# Patient Record
Sex: MALE | Race: WHITE | Employment: UNEMPLOYED | URBAN - METROPOLITAN AREA
[De-identification: names, ages, dates, MRNs, and addresses within clinical notes are randomized per-mention and may not be internally consistent; named-entity substitution may affect disease eponyms.]

---

## 2018-12-21 ENCOUNTER — TELEPHONE (OUTPATIENT)
Dept: FAMILY MEDICINE CLINIC | Age: 23
End: 2018-12-21

## 2018-12-21 ENCOUNTER — OFFICE VISIT (OUTPATIENT)
Dept: FAMILY MEDICINE CLINIC | Age: 23
End: 2018-12-21
Payer: COMMERCIAL

## 2018-12-21 VITALS
BODY MASS INDEX: 26.51 KG/M2 | HEART RATE: 68 BPM | HEIGHT: 73 IN | WEIGHT: 200 LBS | DIASTOLIC BLOOD PRESSURE: 80 MMHG | SYSTOLIC BLOOD PRESSURE: 120 MMHG

## 2018-12-21 DIAGNOSIS — R42 DIZZINESS: ICD-10-CM

## 2018-12-21 DIAGNOSIS — M54.41 CHRONIC LOW BACK PAIN WITH BILATERAL SCIATICA, UNSPECIFIED BACK PAIN LATERALITY: Primary | ICD-10-CM

## 2018-12-21 DIAGNOSIS — M54.42 CHRONIC LOW BACK PAIN WITH BILATERAL SCIATICA, UNSPECIFIED BACK PAIN LATERALITY: Primary | ICD-10-CM

## 2018-12-21 DIAGNOSIS — G89.29 CHRONIC LOW BACK PAIN WITH BILATERAL SCIATICA, UNSPECIFIED BACK PAIN LATERALITY: Primary | ICD-10-CM

## 2018-12-21 DIAGNOSIS — I49.49 EXTRASYSTOLES: ICD-10-CM

## 2018-12-21 PROCEDURE — 99202 OFFICE O/P NEW SF 15 MIN: CPT | Performed by: NURSE PRACTITIONER

## 2018-12-21 PROCEDURE — 93000 ELECTROCARDIOGRAM COMPLETE: CPT | Performed by: NURSE PRACTITIONER

## 2018-12-21 RX ORDER — MELOXICAM 7.5 MG/1
7.5 TABLET ORAL DAILY
Qty: 30 TABLET | Refills: 3 | Status: SHIPPED | OUTPATIENT
Start: 2018-12-21 | End: 2019-01-21 | Stop reason: ALTCHOICE

## 2018-12-21 ASSESSMENT — ENCOUNTER SYMPTOMS
ABDOMINAL PAIN: 0
ALLERGIC/IMMUNOLOGIC NEGATIVE: 1
CONSTIPATION: 0
RESPIRATORY NEGATIVE: 1
SHORTNESS OF BREATH: 0
VOMITING: 0
COUGH: 0
NAUSEA: 0
DIARRHEA: 0
GASTROINTESTINAL NEGATIVE: 1
BACK PAIN: 0

## 2019-01-04 ENCOUNTER — TELEPHONE (OUTPATIENT)
Dept: FAMILY MEDICINE CLINIC | Age: 24
End: 2019-01-04

## 2019-01-21 ENCOUNTER — OFFICE VISIT (OUTPATIENT)
Dept: FAMILY MEDICINE CLINIC | Age: 24
End: 2019-01-21
Payer: COMMERCIAL

## 2019-01-21 VITALS
BODY MASS INDEX: 27.97 KG/M2 | HEART RATE: 68 BPM | WEIGHT: 212 LBS | SYSTOLIC BLOOD PRESSURE: 124 MMHG | DIASTOLIC BLOOD PRESSURE: 78 MMHG

## 2019-01-21 DIAGNOSIS — R42 DIZZINESS: ICD-10-CM

## 2019-01-21 DIAGNOSIS — Z87.448 H/O HEMATURIA: ICD-10-CM

## 2019-01-21 DIAGNOSIS — M54.42 CHRONIC BILATERAL LOW BACK PAIN WITH BILATERAL SCIATICA: Primary | ICD-10-CM

## 2019-01-21 DIAGNOSIS — M54.41 CHRONIC BILATERAL LOW BACK PAIN WITH BILATERAL SCIATICA: Primary | ICD-10-CM

## 2019-01-21 DIAGNOSIS — G89.29 CHRONIC NECK PAIN: ICD-10-CM

## 2019-01-21 DIAGNOSIS — M54.2 CHRONIC NECK PAIN: ICD-10-CM

## 2019-01-21 DIAGNOSIS — G89.29 CHRONIC BILATERAL LOW BACK PAIN WITH BILATERAL SCIATICA: Primary | ICD-10-CM

## 2019-01-21 DIAGNOSIS — G89.29 CHRONIC THORACIC SPINE PAIN: ICD-10-CM

## 2019-01-21 DIAGNOSIS — R30.0 BURNING WITH URINATION: ICD-10-CM

## 2019-01-21 DIAGNOSIS — M54.6 CHRONIC THORACIC SPINE PAIN: ICD-10-CM

## 2019-01-21 LAB
BILIRUBIN, POC: NORMAL
BLOOD URINE, POC: NORMAL
CLARITY, POC: NORMAL
COLOR, POC: YELLOW
GLUCOSE URINE, POC: NORMAL
KETONES, POC: NORMAL
LEUKOCYTE EST, POC: NORMAL
NITRITE, POC: NORMAL
PH, POC: 6
PROTEIN, POC: NORMAL
SPECIFIC GRAVITY, POC: 1.03
UROBILINOGEN, POC: 0.2

## 2019-01-21 PROCEDURE — 99213 OFFICE O/P EST LOW 20 MIN: CPT | Performed by: NURSE PRACTITIONER

## 2019-01-21 PROCEDURE — 81003 URINALYSIS AUTO W/O SCOPE: CPT | Performed by: NURSE PRACTITIONER

## 2019-01-21 ASSESSMENT — ENCOUNTER SYMPTOMS
BACK PAIN: 1
DIARRHEA: 0
CONSTIPATION: 0
NAUSEA: 0
RESPIRATORY NEGATIVE: 1
COUGH: 0
GASTROINTESTINAL NEGATIVE: 1
ABDOMINAL PAIN: 0
SHORTNESS OF BREATH: 0
VOMITING: 0
ALLERGIC/IMMUNOLOGIC NEGATIVE: 1
RECTAL PAIN: 0

## 2019-01-21 ASSESSMENT — PATIENT HEALTH QUESTIONNAIRE - PHQ9: DEPRESSION UNABLE TO ASSESS: URGENT/EMERGENT SITUATION

## 2019-02-06 ENCOUNTER — TELEPHONE (OUTPATIENT)
Dept: FAMILY MEDICINE CLINIC | Age: 24
End: 2019-02-06

## 2019-02-06 DIAGNOSIS — M54.41 CHRONIC BILATERAL LOW BACK PAIN WITH BILATERAL SCIATICA: ICD-10-CM

## 2019-02-06 DIAGNOSIS — M54.6 CHRONIC THORACIC SPINE PAIN: ICD-10-CM

## 2019-02-06 DIAGNOSIS — G89.29 CHRONIC NECK PAIN: Primary | ICD-10-CM

## 2019-02-06 DIAGNOSIS — M54.42 CHRONIC BILATERAL LOW BACK PAIN WITH BILATERAL SCIATICA: ICD-10-CM

## 2019-02-06 DIAGNOSIS — G89.29 CHRONIC BILATERAL LOW BACK PAIN WITH BILATERAL SCIATICA: ICD-10-CM

## 2019-02-06 DIAGNOSIS — M54.2 CHRONIC NECK PAIN: Primary | ICD-10-CM

## 2019-02-06 DIAGNOSIS — G89.29 CHRONIC THORACIC SPINE PAIN: ICD-10-CM

## 2019-02-12 ENCOUNTER — TELEPHONE (OUTPATIENT)
Dept: FAMILY MEDICINE CLINIC | Age: 24
End: 2019-02-12

## 2019-02-12 DIAGNOSIS — M54.6 CHRONIC THORACIC SPINE PAIN: ICD-10-CM

## 2019-02-12 DIAGNOSIS — G89.29 CHRONIC NECK PAIN: ICD-10-CM

## 2019-02-12 DIAGNOSIS — G89.29 CHRONIC BILATERAL LOW BACK PAIN WITH BILATERAL SCIATICA: Primary | ICD-10-CM

## 2019-02-12 DIAGNOSIS — G89.29 CHRONIC THORACIC SPINE PAIN: ICD-10-CM

## 2019-02-12 DIAGNOSIS — M54.42 CHRONIC BILATERAL LOW BACK PAIN WITH BILATERAL SCIATICA: ICD-10-CM

## 2019-02-12 DIAGNOSIS — M54.42 CHRONIC BILATERAL LOW BACK PAIN WITH BILATERAL SCIATICA: Primary | ICD-10-CM

## 2019-02-12 DIAGNOSIS — M54.2 CHRONIC NECK PAIN: Primary | ICD-10-CM

## 2019-02-12 DIAGNOSIS — G89.29 CHRONIC BILATERAL LOW BACK PAIN WITH BILATERAL SCIATICA: ICD-10-CM

## 2019-02-12 DIAGNOSIS — M54.41 CHRONIC BILATERAL LOW BACK PAIN WITH BILATERAL SCIATICA: Primary | ICD-10-CM

## 2019-02-12 DIAGNOSIS — G89.29 CHRONIC NECK PAIN: Primary | ICD-10-CM

## 2019-02-12 DIAGNOSIS — M54.2 CHRONIC NECK PAIN: ICD-10-CM

## 2019-02-12 DIAGNOSIS — M54.41 CHRONIC BILATERAL LOW BACK PAIN WITH BILATERAL SCIATICA: ICD-10-CM

## 2019-02-28 ENCOUNTER — TELEPHONE (OUTPATIENT)
Dept: FAMILY MEDICINE CLINIC | Age: 24
End: 2019-02-28

## 2019-03-22 ENCOUNTER — OFFICE VISIT (OUTPATIENT)
Dept: FAMILY MEDICINE CLINIC | Age: 24
End: 2019-03-22
Payer: COMMERCIAL

## 2019-03-22 VITALS
WEIGHT: 213 LBS | HEART RATE: 76 BPM | DIASTOLIC BLOOD PRESSURE: 84 MMHG | BODY MASS INDEX: 28.1 KG/M2 | SYSTOLIC BLOOD PRESSURE: 124 MMHG

## 2019-03-22 DIAGNOSIS — R20.0 NUMBNESS AND TINGLING OF LEFT UPPER AND LOWER EXTREMITY: ICD-10-CM

## 2019-03-22 DIAGNOSIS — R26.9 ALTERED GAIT: ICD-10-CM

## 2019-03-22 DIAGNOSIS — M54.6 CHRONIC THORACIC SPINE PAIN: ICD-10-CM

## 2019-03-22 DIAGNOSIS — M54.41 CHRONIC BILATERAL LOW BACK PAIN WITH BILATERAL SCIATICA: Primary | ICD-10-CM

## 2019-03-22 DIAGNOSIS — M54.42 CHRONIC BILATERAL LOW BACK PAIN WITH BILATERAL SCIATICA: Primary | ICD-10-CM

## 2019-03-22 DIAGNOSIS — R42 DIZZINESS: ICD-10-CM

## 2019-03-22 DIAGNOSIS — G89.29 CHRONIC THORACIC SPINE PAIN: ICD-10-CM

## 2019-03-22 DIAGNOSIS — R20.2 NUMBNESS AND TINGLING OF LEFT UPPER AND LOWER EXTREMITY: ICD-10-CM

## 2019-03-22 DIAGNOSIS — G89.29 CHRONIC BILATERAL LOW BACK PAIN WITH BILATERAL SCIATICA: Primary | ICD-10-CM

## 2019-03-22 PROCEDURE — G8427 DOCREV CUR MEDS BY ELIG CLIN: HCPCS | Performed by: NURSE PRACTITIONER

## 2019-03-22 PROCEDURE — G8419 CALC BMI OUT NRM PARAM NOF/U: HCPCS | Performed by: NURSE PRACTITIONER

## 2019-03-22 PROCEDURE — 99214 OFFICE O/P EST MOD 30 MIN: CPT | Performed by: NURSE PRACTITIONER

## 2019-03-22 PROCEDURE — G8484 FLU IMMUNIZE NO ADMIN: HCPCS | Performed by: NURSE PRACTITIONER

## 2019-03-22 PROCEDURE — 1036F TOBACCO NON-USER: CPT | Performed by: NURSE PRACTITIONER

## 2019-03-22 RX ORDER — LIDOCAINE 50 MG/G
1 PATCH TOPICAL DAILY
Qty: 30 PATCH | Refills: 0 | Status: SHIPPED | OUTPATIENT
Start: 2019-03-22 | End: 2019-04-21

## 2019-03-22 ASSESSMENT — ENCOUNTER SYMPTOMS
COUGH: 0
GASTROINTESTINAL NEGATIVE: 1
BACK PAIN: 1
VOMITING: 0
RESPIRATORY NEGATIVE: 1
ABDOMINAL PAIN: 0
EYES NEGATIVE: 1
DIARRHEA: 0
NAUSEA: 0
CONSTIPATION: 0
ALLERGIC/IMMUNOLOGIC NEGATIVE: 1
SHORTNESS OF BREATH: 0

## 2019-03-26 ENCOUNTER — OFFICE VISIT (OUTPATIENT)
Dept: FAMILY MEDICINE CLINIC | Age: 24
End: 2019-03-26
Payer: COMMERCIAL

## 2019-03-26 ENCOUNTER — TELEPHONE (OUTPATIENT)
Dept: FAMILY MEDICINE CLINIC | Age: 24
End: 2019-03-26

## 2019-03-26 VITALS
SYSTOLIC BLOOD PRESSURE: 138 MMHG | WEIGHT: 214 LBS | DIASTOLIC BLOOD PRESSURE: 80 MMHG | BODY MASS INDEX: 27.46 KG/M2 | HEIGHT: 74 IN | RESPIRATION RATE: 12 BRPM | HEART RATE: 84 BPM

## 2019-03-26 DIAGNOSIS — R20.0 NUMBNESS AND TINGLING OF LEFT UPPER AND LOWER EXTREMITY: ICD-10-CM

## 2019-03-26 DIAGNOSIS — M54.2 CHRONIC NECK PAIN: ICD-10-CM

## 2019-03-26 DIAGNOSIS — G89.29 CHRONIC NECK PAIN: ICD-10-CM

## 2019-03-26 DIAGNOSIS — R20.2 NUMBNESS AND TINGLING OF LEFT UPPER AND LOWER EXTREMITY: ICD-10-CM

## 2019-03-26 DIAGNOSIS — M54.42 CHRONIC BILATERAL LOW BACK PAIN WITH BILATERAL SCIATICA: Primary | ICD-10-CM

## 2019-03-26 DIAGNOSIS — R20.0 NUMBNESS AND TINGLING OF RIGHT ARM AND LEG: ICD-10-CM

## 2019-03-26 DIAGNOSIS — R20.2 NUMBNESS AND TINGLING OF RIGHT ARM AND LEG: ICD-10-CM

## 2019-03-26 DIAGNOSIS — G89.29 CHRONIC BILATERAL LOW BACK PAIN WITH BILATERAL SCIATICA: Primary | ICD-10-CM

## 2019-03-26 DIAGNOSIS — G89.29 CHRONIC THORACIC SPINE PAIN: ICD-10-CM

## 2019-03-26 DIAGNOSIS — M54.41 CHRONIC BILATERAL LOW BACK PAIN WITH BILATERAL SCIATICA: Primary | ICD-10-CM

## 2019-03-26 DIAGNOSIS — M54.6 CHRONIC THORACIC SPINE PAIN: ICD-10-CM

## 2019-03-26 PROCEDURE — 1036F TOBACCO NON-USER: CPT | Performed by: NURSE PRACTITIONER

## 2019-03-26 PROCEDURE — G8484 FLU IMMUNIZE NO ADMIN: HCPCS | Performed by: NURSE PRACTITIONER

## 2019-03-26 PROCEDURE — G8419 CALC BMI OUT NRM PARAM NOF/U: HCPCS | Performed by: NURSE PRACTITIONER

## 2019-03-26 PROCEDURE — 99214 OFFICE O/P EST MOD 30 MIN: CPT | Performed by: NURSE PRACTITIONER

## 2019-03-26 PROCEDURE — G8427 DOCREV CUR MEDS BY ELIG CLIN: HCPCS | Performed by: NURSE PRACTITIONER

## 2019-03-26 ASSESSMENT — PATIENT HEALTH QUESTIONNAIRE - PHQ9
SUM OF ALL RESPONSES TO PHQ QUESTIONS 1-9: 0
2. FEELING DOWN, DEPRESSED OR HOPELESS: 0
1. LITTLE INTEREST OR PLEASURE IN DOING THINGS: 0
SUM OF ALL RESPONSES TO PHQ QUESTIONS 1-9: 0
SUM OF ALL RESPONSES TO PHQ9 QUESTIONS 1 & 2: 0

## 2019-04-03 ENCOUNTER — OFFICE VISIT (OUTPATIENT)
Dept: FAMILY MEDICINE CLINIC | Age: 24
End: 2019-04-03
Payer: COMMERCIAL

## 2019-04-03 VITALS
HEART RATE: 92 BPM | WEIGHT: 205 LBS | BODY MASS INDEX: 26.31 KG/M2 | DIASTOLIC BLOOD PRESSURE: 84 MMHG | SYSTOLIC BLOOD PRESSURE: 122 MMHG | HEIGHT: 74 IN

## 2019-04-03 DIAGNOSIS — F41.9 ANXIETY: ICD-10-CM

## 2019-04-03 DIAGNOSIS — F34.1 DYSTHYMIA: Primary | ICD-10-CM

## 2019-04-03 DIAGNOSIS — K21.9 GASTROESOPHAGEAL REFLUX DISEASE, ESOPHAGITIS PRESENCE NOT SPECIFIED: ICD-10-CM

## 2019-04-03 DIAGNOSIS — R11.2 NAUSEA AND VOMITING, INTRACTABILITY OF VOMITING NOT SPECIFIED, UNSPECIFIED VOMITING TYPE: ICD-10-CM

## 2019-04-03 PROCEDURE — G8427 DOCREV CUR MEDS BY ELIG CLIN: HCPCS | Performed by: NURSE PRACTITIONER

## 2019-04-03 PROCEDURE — 99214 OFFICE O/P EST MOD 30 MIN: CPT | Performed by: NURSE PRACTITIONER

## 2019-04-03 PROCEDURE — G8419 CALC BMI OUT NRM PARAM NOF/U: HCPCS | Performed by: NURSE PRACTITIONER

## 2019-04-03 PROCEDURE — 1036F TOBACCO NON-USER: CPT | Performed by: NURSE PRACTITIONER

## 2019-04-03 RX ORDER — BUPROPION HYDROCHLORIDE 150 MG/1
TABLET ORAL
Qty: 60 TABLET | Refills: 1 | Status: SHIPPED | OUTPATIENT
Start: 2019-04-03 | End: 2019-10-29

## 2019-04-03 RX ORDER — ONDANSETRON 4 MG/1
4 TABLET, FILM COATED ORAL DAILY PRN
Qty: 30 TABLET | Refills: 1 | Status: SHIPPED | OUTPATIENT
Start: 2019-04-03 | End: 2019-05-28 | Stop reason: SDUPTHER

## 2019-04-03 RX ORDER — LORAZEPAM 0.5 MG/1
0.5 TABLET ORAL EVERY 8 HOURS PRN
Qty: 10 TABLET | Refills: 0 | Status: SHIPPED | OUTPATIENT
Start: 2019-04-03 | End: 2019-05-03 | Stop reason: SDUPTHER

## 2019-04-03 RX ORDER — RANITIDINE 150 MG/1
150 TABLET ORAL 2 TIMES DAILY PRN
Qty: 60 TABLET | Refills: 6 | Status: SHIPPED | OUTPATIENT
Start: 2019-04-03 | End: 2020-01-07 | Stop reason: ALTCHOICE

## 2019-04-03 NOTE — PROGRESS NOTES
Musculoskeletal: Positive for back pain (Chronic. ). Neurological: Positive for dizziness (Unchanged. Cardiac testing negative. Will be seeing neurology ). Psychiatric/Behavioral: Positive for agitation, dysphoric mood and sleep disturbance. Negative for self-injury and suicidal ideas. The patient is nervous/anxious.         PAST MEDICAL HISTORY    Past Medical History:   Diagnosis Date    Chronic back pain     Undescended right testes        FAMILY HISTORY    Family History   Problem Relation Age of Onset    High Blood Pressure Mother     Heart Disease Mother     Heart Attack Mother 40    Cancer Father         unknown kind     Heart Attack Maternal Grandmother 61        COD     Other Sister         POTS     Heart Attack Maternal Grandfather 48    Prostate Cancer Maternal Grandfather     Stroke Maternal Grandfather         2000    No Known Problems Paternal Grandmother     No Known Problems Paternal Grandfather        SOCIAL HISTORY    Social History     Socioeconomic History    Marital status: Single     Spouse name: None    Number of children: None    Years of education: None    Highest education level: None   Occupational History    None   Social Needs    Financial resource strain: None    Food insecurity:     Worry: None     Inability: None    Transportation needs:     Medical: None     Non-medical: None   Tobacco Use    Smoking status: Former Smoker     Packs/day: 1.00     Years: 6.00     Pack years: 6.00     Types: Cigarettes     Last attempt to quit: 2018     Years since quittin.8    Smokeless tobacco: Never Used   Substance and Sexual Activity    Alcohol use: Yes     Comment: Occasionally     Drug use: No    Sexual activity: Yes     Partners: Female   Lifestyle    Physical activity:     Days per week: None     Minutes per session: None    Stress: None   Relationships    Social connections:     Talks on phone: None     Gets together: None     Attends Mu-ism service: None     Active member of club or organization: None     Attends meetings of clubs or organizations: None     Relationship status: None    Intimate partner violence:     Fear of current or ex partner: None     Emotionally abused: None     Physically abused: None     Forced sexual activity: None   Other Topics Concern    None   Social History Narrative    None       SURGICAL HISTORY    Past Surgical History:   Procedure Laterality Date    TESTICLE SURGERY Right 2011    undecended testicle operation     TYMPANOSTOMY TUBE PLACEMENT  1995       CURRENT MEDICATIONS    Current Outpatient Medications   Medication Sig Dispense Refill    ondansetron (ZOFRAN) 4 MG tablet Take 1 tablet by mouth daily as needed for Nausea or Vomiting 30 tablet 1    buPROPion (WELLBUTRIN XL) 150 MG extended release tablet Take 1 tablet by mouth every morning for 14 days, THEN 2 tablets every morning. 60 tablet 1    ranitidine (ZANTAC) 150 MG tablet Take 1 tablet by mouth 2 times daily as needed for Heartburn 60 tablet 6    LORazepam (ATIVAN) 0.5 MG tablet Take 1 tablet by mouth every 8 hours as needed for Anxiety for up to 30 days. 10 tablet 0    lidocaine (LIDODERM) 5 % Place 1 patch onto the skin daily 12 hours on, 12 hours off. 30 patch 0     No current facility-administered medications for this visit. ALLERGIES    Allergies   Allergen Reactions    Rondec Hives and Nausea And Vomiting       PHYSICAL EXAM   Physical Exam   Constitutional: He is oriented to person, place, and time. Vital signs are normal. He appears well-developed and well-nourished. He is cooperative. No distress. HENT:   Head: Normocephalic. Right Ear: Hearing normal.   Left Ear: Hearing normal.   Eyes: Right eye exhibits no discharge. Left eye exhibits no discharge. Pupils are equal.   Neck: Normal range of motion. Cardiovascular: Normal rate, regular rhythm, S1 normal, S2 normal, normal heart sounds and normal pulses.    No murmur heard.  Pulses:       Radial pulses are 2+ on the right side, and 2+ on the left side. Pulmonary/Chest: Effort normal and breath sounds normal. No respiratory distress. He has no wheezes. Abdominal:   Deferred abdominal exam per patient's request due to back pain. Neurological: He is alert and oriented to person, place, and time. Skin: Skin is warm and dry. He is not diaphoretic. Psychiatric: His behavior is normal. His mood appears anxious. His affect is blunt. He exhibits a depressed mood. He expresses no homicidal and no suicidal ideation. He expresses no suicidal plans and no homicidal plans. Nursing note and vitals reviewed. Assessment/PLan  1. Dysthymia    - buPROPion (WELLBUTRIN XL) 150 MG extended release tablet; Take 1 tablet by mouth every morning for 14 days, THEN 2 tablets every morning. Dispense: 60 tablet; Refill: 1  -Extensive discussion over different medication options and side effects.  -Offered referral to counselor. Patient declines at this time.   -Discussed importance of calling office with any concerns regarding medication or side effects. 2. Anxiety    - LORazepam (ATIVAN) 0.5 MG tablet; Take 1 tablet by mouth every 8 hours as needed for Anxiety for up to 30 days. Dispense: 10 tablet; Refill: 0  -Discussed addictive potential of medication.   -Advised patient to not drink while taking medication.  -Advised to not drive while taking mediation until he knows how it affects him.   -Advised of need for regular follow-up appointments with our office q 3 months medication is continued.   -Patient voiced understanding of POC. 3. Gastroesophageal reflux disease, esophagitis presence not specified    - ranitidine (ZANTAC) 150 MG tablet; Take 1 tablet by mouth 2 times daily as needed for Heartburn  Dispense: 60 tablet;  Refill: 6  -Advised to take daily for at least 1 month and then after that time he may trial a PRN dosing.  -Advised of activity and dietary modification to aide in the reduction of GERD sx.     4. Nausea and vomiting, intractability of vomiting not specified, unspecified vomiting type    - ondansetron (ZOFRAN) 4 MG tablet; Take 1 tablet by mouth daily as needed for Nausea or Vomiting  Dispense: 30 tablet; Refill: 1  - ranitidine (ZANTAC) 150 MG tablet; Take 1 tablet by mouth 2 times daily as needed for Heartburn  Dispense: 60 tablet; Refill: 6      I have spent 30 minutes face to face with the patient more than 50 % if this time was spent counseling and coordinating care. Vinay Carlossal received counseling on the following healthy behaviors: nutrition, exercise and medication adherence  Reviewed prior labs and health maintenance. Continue current medications, diet and exercise. Discussed use, benefit, and side effects of prescribed medications. Barriers to medication compliance addressed. Patient given educational materials - see patient instructions. All patient questions answered. Patient voiced understanding. Return in about 1 month (around 5/3/2019) for Anxiety, depression.     Electronically signed by ANDREA Esqueda CNP on 4/3/19 at 11:56 AM

## 2019-04-03 NOTE — PATIENT INSTRUCTIONS
involved in social groups, or volunteer to help others. Being alone sometimes makes things seem worse than they are. · Get at least 30 minutes of exercise on most days of the week to relieve stress. Walking is a good choice. You also may want to do other activities, such as running, swimming, cycling, or playing tennis or team sports. Relaxation techniques  Do relaxation exercises 10 to 20 minutes a day. You can play soothing, relaxing music while you do them, if you wish. · Tell others in your house that you are going to do your relaxation exercises. Ask them not to disturb you. · Find a comfortable place, away from all distractions and noise. · Lie down on your back, or sit with your back straight. · Focus on your breathing. Make it slow and steady. · Breathe in through your nose. Breathe out through either your nose or mouth. · Breathe deeply, filling up the area between your navel and your rib cage. Breathe so that your belly goes up and down. · Do not hold your breath. · Breathe like this for 5 to 10 minutes. Notice the feeling of calmness throughout your whole body. As you continue to breathe slowly and deeply, relax by doing the following for another 5 to 10 minutes:  · Tighten and relax each muscle group in your body. You can begin at your toes and work your way up to your head. · Imagine your muscle groups relaxing and becoming heavy. · Empty your mind of all thoughts. · Let yourself relax more and more deeply. · Become aware of the state of calmness that surrounds you. · When your relaxation time is over, you can bring yourself back to alertness by moving your fingers and toes and then your hands and feet and then stretching and moving your entire body. Sometimes people fall asleep during relaxation, but they usually wake up shortly afterward.   · Always give yourself time to return to full alertness before you drive a car or do anything that might cause an accident if you are not fully alert. Never play a relaxation tape while you drive a car. When should you call for help? Call 911 anytime you think you may need emergency care. For example, call if:    · You feel you cannot stop from hurting yourself or someone else.   Kena Elias the numbers for these national suicide hotlines: 7-051-931-TALK (4-806.337.1181) and 9-732-DYPZETE (6-444.661.9169). If you or someone you know talks about suicide or feeling hopeless, get help right away.   Watch closely for changes in your health, and be sure to contact your doctor if:    · You have anxiety or fear that affects your life.     · You have symptoms of anxiety that are new or different from those you had before. Where can you learn more? Go to https://[employer]formanceeb.[email or web address]. org and sign in to your [employer] account. Enter P754 in the [employer] box to learn more about \"Anxiety Disorder: Care Instructions. \"     If you do not have an account, please click on the \"Sign Up Now\" link. Current as of: September 11, 2018  Content Version: 11.9  © 2932-9930 [employer]. Care instructions adapted under license by [facility] ([facility]). If you have questions about a medical condition or this instruction, always ask your healthcare professional. [address] any warranty or liability for your use of this information.

## 2019-04-14 ASSESSMENT — ENCOUNTER SYMPTOMS
SHORTNESS OF BREATH: 0
BACK PAIN: 1
NAUSEA: 1
ABDOMINAL PAIN: 1
COUGH: 0
RESPIRATORY NEGATIVE: 1
VOMITING: 1

## 2019-04-22 ENCOUNTER — APPOINTMENT (OUTPATIENT)
Dept: GENERAL RADIOLOGY | Age: 24
End: 2019-04-22
Payer: COMMERCIAL

## 2019-04-22 ENCOUNTER — HOSPITAL ENCOUNTER (EMERGENCY)
Age: 24
Discharge: HOME | End: 2019-04-23
Attending: EMERGENCY MEDICINE
Payer: COMMERCIAL

## 2019-04-22 DIAGNOSIS — R06.02 SHORTNESS OF BREATH: ICD-10-CM

## 2019-04-22 DIAGNOSIS — R07.9 CHEST PAIN, UNSPECIFIED TYPE: Primary | ICD-10-CM

## 2019-04-22 LAB
ABSOLUTE EOS #: 0.11 K/UL (ref 0–0.44)
ABSOLUTE IMMATURE GRANULOCYTE: <0.03 K/UL (ref 0–0.3)
ABSOLUTE LYMPH #: 2.03 K/UL (ref 1.1–3.7)
ABSOLUTE MONO #: 1.13 K/UL (ref 0.1–1.2)
ANION GAP SERPL CALCULATED.3IONS-SCNC: 13 MMOL/L (ref 9–17)
BASOPHILS # BLD: 0 % (ref 0–2)
BASOPHILS ABSOLUTE: <0.03 K/UL (ref 0–0.2)
BUN BLDV-MCNC: 18 MG/DL (ref 6–20)
BUN/CREAT BLD: NORMAL (ref 9–20)
CALCIUM SERPL-MCNC: 9.2 MG/DL (ref 8.6–10.4)
CHLORIDE BLD-SCNC: 103 MMOL/L (ref 98–107)
CO2: 20 MMOL/L (ref 20–31)
CREAT SERPL-MCNC: 0.88 MG/DL (ref 0.7–1.2)
D-DIMER QUANTITATIVE: <0.17 MG/L FEU
DIFFERENTIAL TYPE: ABNORMAL
EOSINOPHILS RELATIVE PERCENT: 1 % (ref 1–4)
GFR AFRICAN AMERICAN: >60 ML/MIN
GFR NON-AFRICAN AMERICAN: >60 ML/MIN
GFR SERPL CREATININE-BSD FRML MDRD: NORMAL ML/MIN/{1.73_M2}
GFR SERPL CREATININE-BSD FRML MDRD: NORMAL ML/MIN/{1.73_M2}
GLUCOSE BLD-MCNC: 92 MG/DL (ref 70–99)
HCT VFR BLD CALC: 42.5 % (ref 40.7–50.3)
HEMOGLOBIN: 14.1 G/DL (ref 13–17)
IMMATURE GRANULOCYTES: 0 %
LYMPHOCYTES # BLD: 22 % (ref 24–43)
MCH RBC QN AUTO: 30.5 PG (ref 25.2–33.5)
MCHC RBC AUTO-ENTMCNC: 33.2 G/DL (ref 28.4–34.8)
MCV RBC AUTO: 92 FL (ref 82.6–102.9)
MONOCYTES # BLD: 13 % (ref 3–12)
NRBC AUTOMATED: 0 PER 100 WBC
PDW BLD-RTO: 13 % (ref 11.8–14.4)
PLATELET # BLD: 270 K/UL (ref 138–453)
PLATELET ESTIMATE: ABNORMAL
PMV BLD AUTO: 9.7 FL (ref 8.1–13.5)
POTASSIUM SERPL-SCNC: 3.8 MMOL/L (ref 3.7–5.3)
RBC # BLD: 4.62 M/UL (ref 4.21–5.77)
RBC # BLD: ABNORMAL 10*6/UL
SEG NEUTROPHILS: 64 % (ref 36–65)
SEGMENTED NEUTROPHILS ABSOLUTE COUNT: 5.75 K/UL (ref 1.5–8.1)
SODIUM BLD-SCNC: 136 MMOL/L (ref 135–144)
TROPONIN INTERP: NORMAL
TROPONIN T: NORMAL NG/ML
TROPONIN, HIGH SENSITIVITY: <6 NG/L (ref 0–22)
WBC # BLD: 9.1 K/UL (ref 3.5–11.3)
WBC # BLD: ABNORMAL 10*3/UL

## 2019-04-22 PROCEDURE — 85379 FIBRIN DEGRADATION QUANT: CPT

## 2019-04-22 PROCEDURE — 71046 X-RAY EXAM CHEST 2 VIEWS: CPT

## 2019-04-22 PROCEDURE — 6370000000 HC RX 637 (ALT 250 FOR IP): Performed by: EMERGENCY MEDICINE

## 2019-04-22 PROCEDURE — 93005 ELECTROCARDIOGRAM TRACING: CPT

## 2019-04-22 PROCEDURE — 99285 EMERGENCY DEPT VISIT HI MDM: CPT

## 2019-04-22 PROCEDURE — 85025 COMPLETE CBC W/AUTO DIFF WBC: CPT

## 2019-04-22 PROCEDURE — 84484 ASSAY OF TROPONIN QUANT: CPT

## 2019-04-22 PROCEDURE — 2580000003 HC RX 258: Performed by: EMERGENCY MEDICINE

## 2019-04-22 PROCEDURE — 80048 BASIC METABOLIC PNL TOTAL CA: CPT

## 2019-04-22 RX ORDER — ASPIRIN 81 MG/1
324 TABLET, CHEWABLE ORAL ONCE
Status: COMPLETED | OUTPATIENT
Start: 2019-04-22 | End: 2019-04-22

## 2019-04-22 RX ORDER — 0.9 % SODIUM CHLORIDE 0.9 %
1000 INTRAVENOUS SOLUTION INTRAVENOUS ONCE
Status: COMPLETED | OUTPATIENT
Start: 2019-04-22 | End: 2019-04-23

## 2019-04-22 RX ADMIN — SODIUM CHLORIDE 1000 ML: 9 INJECTION, SOLUTION INTRAVENOUS at 23:16

## 2019-04-22 RX ADMIN — ASPIRIN 81 MG 324 MG: 81 TABLET ORAL at 23:17

## 2019-04-22 ASSESSMENT — PAIN SCALES - GENERAL: PAINLEVEL_OUTOF10: 4

## 2019-04-22 ASSESSMENT — PAIN DESCRIPTION - DESCRIPTORS: DESCRIPTORS: TIGHTNESS

## 2019-04-22 ASSESSMENT — PAIN DESCRIPTION - PAIN TYPE: TYPE: ACUTE PAIN

## 2019-04-22 ASSESSMENT — PAIN DESCRIPTION - LOCATION: LOCATION: CHEST

## 2019-04-23 ENCOUNTER — OFFICE VISIT (OUTPATIENT)
Dept: FAMILY MEDICINE CLINIC | Age: 24
End: 2019-04-23
Payer: COMMERCIAL

## 2019-04-23 VITALS
DIASTOLIC BLOOD PRESSURE: 79 MMHG | BODY MASS INDEX: 24.87 KG/M2 | OXYGEN SATURATION: 98 % | SYSTOLIC BLOOD PRESSURE: 130 MMHG | HEART RATE: 79 BPM | WEIGHT: 200 LBS | RESPIRATION RATE: 20 BRPM | HEIGHT: 75 IN | TEMPERATURE: 98.8 F

## 2019-04-23 VITALS
BODY MASS INDEX: 25.87 KG/M2 | DIASTOLIC BLOOD PRESSURE: 90 MMHG | WEIGHT: 207 LBS | SYSTOLIC BLOOD PRESSURE: 142 MMHG | HEART RATE: 90 BPM

## 2019-04-23 DIAGNOSIS — R11.14 BILIOUS VOMITING WITH NAUSEA: ICD-10-CM

## 2019-04-23 DIAGNOSIS — R07.89 COSTOCHONDRAL CHEST PAIN: ICD-10-CM

## 2019-04-23 DIAGNOSIS — R05.9 COUGH: ICD-10-CM

## 2019-04-23 DIAGNOSIS — R10.9 LEFT SIDED ABDOMINAL PAIN: Primary | ICD-10-CM

## 2019-04-23 LAB
BILIRUBIN, POC: NORMAL
BLOOD URINE, POC: NORMAL
CLARITY, POC: CLEAR
COLOR, POC: YELLOW
EKG ATRIAL RATE: 83 BPM
EKG P AXIS: 69 DEGREES
EKG P-R INTERVAL: 122 MS
EKG Q-T INTERVAL: 354 MS
EKG QRS DURATION: 88 MS
EKG QTC CALCULATION (BAZETT): 415 MS
EKG R AXIS: 51 DEGREES
EKG T AXIS: 59 DEGREES
EKG VENTRICULAR RATE: 83 BPM
GLUCOSE URINE, POC: NORMAL
KETONES, POC: 15
LEUKOCYTE EST, POC: NORMAL
NITRITE, POC: NORMAL
PH, POC: 5.5
PROTEIN, POC: NORMAL
SPECIFIC GRAVITY, POC: 1.03
UROBILINOGEN, POC: 0.2

## 2019-04-23 PROCEDURE — G8419 CALC BMI OUT NRM PARAM NOF/U: HCPCS | Performed by: NURSE PRACTITIONER

## 2019-04-23 PROCEDURE — 99213 OFFICE O/P EST LOW 20 MIN: CPT | Performed by: NURSE PRACTITIONER

## 2019-04-23 PROCEDURE — 1036F TOBACCO NON-USER: CPT | Performed by: NURSE PRACTITIONER

## 2019-04-23 PROCEDURE — 81003 URINALYSIS AUTO W/O SCOPE: CPT | Performed by: NURSE PRACTITIONER

## 2019-04-23 PROCEDURE — G8427 DOCREV CUR MEDS BY ELIG CLIN: HCPCS | Performed by: NURSE PRACTITIONER

## 2019-04-23 RX ORDER — IBUPROFEN 600 MG/1
600 TABLET ORAL
COMMUNITY
Start: 2018-10-09 | End: 2019-05-28 | Stop reason: ALTCHOICE

## 2019-04-23 ASSESSMENT — ENCOUNTER SYMPTOMS
SORE THROAT: 0
EYE PAIN: 0
VOMITING: 1
COUGH: 1
NAUSEA: 0
BACK PAIN: 1
COUGH: 0
SHORTNESS OF BREATH: 1
ABDOMINAL PAIN: 0
SORE THROAT: 0
CONSTIPATION: 0
SHORTNESS OF BREATH: 1
NAUSEA: 1
VOMITING: 0
COLOR CHANGE: 0
RHINORRHEA: 0
ABDOMINAL PAIN: 1
SINUS PRESSURE: 0
DIARRHEA: 0

## 2019-04-23 NOTE — ED NOTES
Pt presents to the ED c/o SOB and chest pain. Pt states that he woke this morning with SOB, and it has been worsening throughout the day. Pt states that he developed mid chest pain around 16:00 this evening. Pt states that he was not doing anything at onset of pain. Pt also c/o associated nausea. Pt states that he had been lying around for most of the day d/t pain and when he went to get up later this evening he was lightheaded.   On exam, pt with dyspnea, pt ambulatory with steady gait, pt awake and oriented x 4, pt O2SAT 100% on room air      Leverette Cogan, RN  04/22/19 1899

## 2019-04-23 NOTE — ED NOTES
Pt placed on 2L nasal cannula for comfort measures per verbal order of Dr. Tyrone Duffy, RN  04/22/19 9861

## 2019-04-23 NOTE — ED PROVIDER NOTES
[facility]  ED  Emergency Department Encounter  EmergencyMedicine Resident     Pt Name:Tre Dubon  MRN: [id number]  Davygfisaura 1995  Date of evaluation: 4/22/19  PCP:  Satinder Mcghee       Chief Complaint   Patient presents with    Chest Pain     Pt c/o SOB and chest pain worsening since this morning. Pt also reports associated nausea with the pain. Pt states that he sat around home all day hoping for symptoms to improve, but tried to get up and got lightheaded and decided to come to the ED     Shortness of Breath    Nausea       HISTORY OF PRESENT ILLNESS  (Location/Symptom, Timing/Onset, Context/Setting, Quality, Duration, Modifying Factors, Severity.)      Citlalli Funes is a 30 y.o. male who presents with chief complaint of shortness of breath. States that symptoms started sometime early this morning after waking up. Denies any precipitating events. States that he's been short of breath all afternoon. States that he had some left-sided chest pain sometime around 5 this evening. States that the pain is in the middle and the left chest, nonradiating, sharp in nature, denies any alleviating or exacerbating factors. Denies any associated nausea, vomiting, diaphoresis. History of smoking, states that he quit about 10 months ago. Denies any history of dyslipidemia, hypertension or diabetes. Denies any history of asthma or COPD. Denies any history of DVT/PE or related risk factors. PAST MEDICAL / SURGICAL / SOCIAL / FAMILY HISTORY      has a past medical history of Chronic back pain and Undescended right testes. has a past surgical history that includes Testicle surgery (Right, 2011) and Tympanostomy tube placement (1995).     Social History     Socioeconomic History    Marital status: Single     Spouse name: Not on file    Number of children: Not on file    Years of education: Not on file    Highest education level: Not on file Occupational History    Not on file   Social Needs    Financial resource strain: Not on file    Food insecurity:     Worry: Not on file     Inability: Not on file    Transportation needs:     Medical: Not on file     Non-medical: Not on file   Tobacco Use    Smoking status: Former Smoker     Packs/day: 1.00     Years: 6.00     Pack years: 6.00     Types: Cigarettes     Last attempt to quit: 2018     Years since quittin.8    Smokeless tobacco: Never Used   Substance and Sexual Activity    Alcohol use: Yes     Comment: Occasionally     Drug use: No    Sexual activity: Yes     Partners: Female   Lifestyle    Physical activity:     Days per week: Not on file     Minutes per session: Not on file    Stress: Not on file   Relationships    Social connections:     Talks on phone: Not on file     Gets together: Not on file     Attends Sabianist service: Not on file     Active member of club or organization: Not on file     Attends meetings of clubs or organizations: Not on file     Relationship status: Not on file    Intimate partner violence:     Fear of current or ex partner: Not on file     Emotionally abused: Not on file     Physically abused: Not on file     Forced sexual activity: Not on file   Other Topics Concern    Not on file   Social History Narrative    Not on file       Family History   Problem Relation Age of Onset    High Blood Pressure Mother     Heart Disease Mother     Heart Attack Mother 40    Cancer Father         unknown kind     Heart Attack Maternal Grandmother 61        COD     Other Sister         POTS     Heart Attack Maternal Grandfather 48    Prostate Cancer Maternal Grandfather     Stroke Maternal Grandfather             No Known Problems Paternal Grandmother     No Known Problems Paternal Grandfather        Allergies:  Rondec    Home Medications:  Prior to Admission medications    Medication Sig Start Date End Date Taking?  Authorizing Provider   ondansetron (ZOFRAN) 4 MG tablet Take 1 tablet by mouth daily as needed for Nausea or Vomiting 4/3/19   ANDREA Lopez - CNP   buPROPion (WELLBUTRIN XL) 150 MG extended release tablet Take 1 tablet by mouth every morning for 14 days, THEN 2 tablets every morning. 4/3/19 5/17/19  ANDREA Lopez - CNP   ranitidine (ZANTAC) 150 MG tablet Take 1 tablet by mouth 2 times daily as needed for Heartburn 4/3/19   ANDREA Lopez - CNP   LORazepam (ATIVAN) 0.5 MG tablet Take 1 tablet by mouth every 8 hours as needed for Anxiety for up to 30 days. 4/3/19 5/3/19  ANDREA Lopez CNP       REVIEW OF SYSTEMS    (2-9 systems for level 4, 10 or more for level 5)      Review of Systems   Constitutional: Negative for chills and fever. HENT: Negative for rhinorrhea and sore throat. Eyes: Negative for pain and visual disturbance. Respiratory: Positive for shortness of breath. Negative for cough. Cardiovascular: Positive for chest pain. Negative for palpitations. Gastrointestinal: Negative for abdominal pain, nausea and vomiting. Genitourinary: Negative for difficulty urinating and dysuria. Musculoskeletal: Negative for arthralgias and myalgias. Skin: Negative for color change and wound. Neurological: Negative for weakness, numbness and headaches. Psychiatric/Behavioral: Negative for behavioral problems and dysphoric mood. PHYSICAL EXAM   (up to 7 for level 4, 8 or more for level 5)      INITIAL VITALS:   /79   Pulse 79   Temp 98.8 °F (37.1 °C) (Oral)   Resp 20   Ht 6' 3\" (1.905 m)   Wt 200 lb (90.7 kg)   SpO2 98%   BMI 25.00 kg/m²     Physical Exam   Constitutional: He is oriented to person, place, and time. He appears well-developed and well-nourished. No distress. HENT:   Head: Normocephalic and atraumatic. Mouth/Throat: Oropharynx is clear and moist.   Eyes: Pupils are equal, round, and reactive to light. EOM are normal.   Neck: Normal range of motion.    Cardiovascular: Normal rate and regular rhythm. Pulmonary/Chest: Breath sounds normal. He has no wheezes. He has no rales. Tachypneic, no subcostal retractions noted   Abdominal: Soft. There is no tenderness. There is no rebound and no guarding. Musculoskeletal: Normal range of motion. Bilateral lower extremities are symmetric, without pitting edema, nontender, no palpable cords felt   Neurological: He is alert and oriented to person, place, and time. He has normal strength. GCS eye subscore is 4. GCS verbal subscore is 5. GCS motor subscore is 6. Skin: Skin is warm and dry.    Psychiatric: His behavior is normal.       DIFFERENTIAL  DIAGNOSIS     PLAN (LABS / IMAGING / EKG):  Orders Placed This Encounter   Procedures    XR CHEST STANDARD (2 VW)    CBC Auto Differential    Basic Metabolic Panel    Troponin    D-Dimer, Quantitative    EKG 12 Lead    Insert peripheral IV       MEDICATIONS ORDERED:  Orders Placed This Encounter   Medications    0.9 % sodium chloride bolus    aspirin chewable tablet 324 mg       DIAGNOSTIC RESULTS / EMERGENCY DEPARTMENT COURSE / MDM     LABS:  Results for orders placed or performed during the hospital encounter of 04/22/19   CBC Auto Differential   Result Value Ref Range    WBC 9.1 3.5 - 11.3 k/uL    RBC 4.62 4.21 - 5.77 m/uL    Hemoglobin 14.1 13.0 - 17.0 g/dL    Hematocrit 42.5 40.7 - 50.3 %    MCV 92.0 82.6 - 102.9 fL    MCH 30.5 25.2 - 33.5 pg    MCHC 33.2 28.4 - 34.8 g/dL    RDW 13.0 11.8 - 14.4 %    Platelets 066 879 - 832 k/uL    MPV 9.7 8.1 - 13.5 fL    NRBC Automated 0.0 0.0 per 100 WBC    Differential Type NOT REPORTED     Seg Neutrophils 64 36 - 65 %    Lymphocytes 22 (L) 24 - 43 %    Monocytes 13 (H) 3 - 12 %    Eosinophils % 1 1 - 4 %    Basophils 0 0 - 2 %    Immature Granulocytes 0 0 %    Segs Absolute 5.75 1.50 - 8.10 k/uL    Absolute Lymph # 2.03 1.10 - 3.70 k/uL    Absolute Mono # 1.13 0.10 - 1.20 k/uL    Absolute Eos # 0.11 0.00 - 0.44 k/uL    Basophils # <0.03 0.00 - 0.20 consolidation, pleural effusion or pneumothorax. The visualized osseous structures demonstrate no acute abnormality. No acute cardiopulmonary abnormality. EKG  None    All EKG's are interpreted by the Emergency Department Physician who either signs or Co-signs this chart in the absence of a cardiologist.    EMERGENCY DEPARTMENT COURSE:  Patient was updated on lab results. Reassessed multiple times. Throughout ED course, symptoms improved dramatically. Patient no longer short of breath. Denies any constitutional complaints. Felt comfortable going home. Patient was advised to follow-up with his primary care doctor. Discussed that if symptoms worsen, to return to the ED. Patient agreeable to plan, stable for discharge. PROCEDURES:  None    CONSULTS:  None    CRITICAL CARE:  None    FINAL IMPRESSION      1. Chest pain, unspecified type    2.  Shortness of breath          DISPOSITION / PLAN     DISPOSITION Decision To Discharge 04/23/2019 01:25:40 AM      PATIENT REFERRED TO:  Donna Fernandes, APRN - [facility]  [address] [id number] [address] [phone]      If symptoms worsen      DISCHARGE MEDICATIONS:  Discharge Medication List as of 4/23/2019  1:26 AM          Serena Kumar MD  Emergency Medicine Resident    (Please note that portions of thisnote were completed with a voice recognition program.  Efforts were made to edit the dictations but occasionally words are mis-transcribed.)        Jacob Hernandez MD  Resident  04/23/19 [id number]

## 2019-04-23 NOTE — PROGRESS NOTES
[facility] PHYSICIANS  LILIA DURAN [facility]  [address]  [address] [facility] [place] [address]  Dept: [phone]    4/23/2019    CHIEF COMPLAINT    Chief Complaint   Patient presents with    Follow-Up from [facility] [place] KATI HENLEY  ANA       [facility]    Cliff Dc is a 30 y.o. male who presents   Chief Complaint   Patient presents with    Follow-Up from Hospital     Here for ER follow-up from  [address]     He presented with left side pain that sometimes travels up toward heart and around the left flank. Pain began yesterday morning and extended through the day. He was seen at [facility] ER around 10:30-11 pm. All cardiac testing, EKG and CXR are within normal limits. He is dizzy, SOB, nauseated and has intermittent sharp pains in left upper abdomen that lasts 10-15 seconds. Otherwise pain is constant. He felt hot and cold all night, but denies any fevers. Last BM yesterday was normal consistency. He has vomited 1-2 times and appeared to be thick mucous and stomach bile. He reports he has been able to eat and drink. He has been taking his medications including Zantac. Denies any recent ibuprofen. He has had a cough for the last 3-4 days. The phlegm has been black-gray in color. Alkalizer cold and congestion medications. Abdominal Pain   This is a new problem. The current episode started yesterday. The onset quality is sudden. The problem occurs constantly (with intermittent sharp abdominal pains ). The problem has been unchanged. The pain is located in the LUQ (wrapping around to back and up towards chest ). The quality of the pain is aching and sharp. The abdominal pain radiates to the back and chest. Associated symptoms include headaches, nausea and vomiting (1-2 episodes ). Pertinent negatives include no constipation, diarrhea, fever or hematuria. The pain is aggravated by deep breathing (hurts under rib with deep inspiration ). The pain is relieved by nothing.  He has tried H2 blockers for the symptoms. The treatment provided no relief. Prior diagnostic workup includes CT scan (CT scan in 6/2018; CXR in hospital. ). His past medical history is significant for GERD. Vitals:    04/23/19 0949   BP: (!) 142/90   Pulse: 90   Weight: 207 lb (93.9 kg)     REVIEW OF SYSTEMS    Review of Systems   Constitutional: Positive for chills and fatigue. Negative for fever. HENT: Positive for congestion. Negative for sinus pressure, sneezing and sore throat. Respiratory: Positive for cough (2-3 days) and shortness of breath. Cardiovascular: Positive for chest pain and palpitations. Gastrointestinal: Positive for abdominal pain, nausea and vomiting (1-2 episodes ). Negative for constipation and diarrhea. Genitourinary: Positive for flank pain. Negative for difficulty urinating and hematuria. Musculoskeletal: Positive for back pain (Mid back pain wrapping around from the left abd pain ). Neurological: Positive for dizziness, weakness (Feels generalized weakness ) and headaches.      PAST MEDICAL HISTORY    Past Medical History:   Diagnosis Date    Chronic back pain 2012    Undescended right testes 2011       FAMILY HISTORY    Family History   Problem Relation Age of Onset    High Blood Pressure Mother     Heart Disease Mother     Heart Attack Mother 40    Cancer Father         unknown kind     Heart Attack Maternal Grandmother 61        COD     Other Sister         POTS     Heart Attack Maternal Grandfather 48    Prostate Cancer Maternal Grandfather     Stroke Maternal Grandfather         2000    No Known Problems Paternal Grandmother     No Known Problems Paternal Grandfather        SOCIAL HISTORY    Social History     Socioeconomic History    Marital status: Single     Spouse name: None    Number of children: None    Years of education: None    Highest education level: None   Occupational History    None   Social Needs    Financial resource strain: None    Food insecurity:     Worry: None     Inability: None    Transportation needs:     Medical: None     Non-medical: None   Tobacco Use    Smoking status: Former Smoker     Packs/day: 1.00     Years: 6.00     Pack years: 6.00     Types: Cigarettes     Last attempt to quit: 2018     Years since quittin.8    Smokeless tobacco: Never Used   Substance and Sexual Activity    Alcohol use: Yes     Comment: Occasionally     Drug use: No    Sexual activity: Yes     Partners: Female   Lifestyle    Physical activity:     Days per week: None     Minutes per session: None    Stress: None   Relationships    Social connections:     Talks on phone: None     Gets together: None     Attends Church service: None     Active member of club or organization: None     Attends meetings of clubs or organizations: None     Relationship status: None    Intimate partner violence:     Fear of current or ex partner: None     Emotionally abused: None     Physically abused: None     Forced sexual activity: None   Other Topics Concern    None   Social History Narrative    None       SURGICAL HISTORY    Past Surgical History:   Procedure Laterality Date    TESTICLE SURGERY Right     undecended testicle operation     TYMPANOSTOMY TUBE PLACEMENT         CURRENT MEDICATIONS    Current Outpatient Medications   Medication Sig Dispense Refill    ibuprofen (ADVIL;MOTRIN) 600 MG tablet Take 600 mg by mouth      ondansetron (ZOFRAN) 4 MG tablet Take 1 tablet by mouth daily as needed for Nausea or Vomiting 30 tablet 1    buPROPion (WELLBUTRIN XL) 150 MG extended release tablet Take 1 tablet by mouth every morning for 14 days, THEN 2 tablets every morning. 60 tablet 1    ranitidine (ZANTAC) 150 MG tablet Take 1 tablet by mouth 2 times daily as needed for Heartburn 60 tablet 6    LORazepam (ATIVAN) 0.5 MG tablet Take 1 tablet by mouth every 8 hours as needed for Anxiety for up to 30 days.  10 tablet 0     No current facility-administered medications for this visit. ALLERGIES    Allergies   Allergen Reactions    Rondec Hives and Nausea And Vomiting       PHYSICAL EXAM   Physical Exam   Constitutional: He is oriented to person, place, and time. Vital signs are normal. He appears well-developed and well-nourished. He is cooperative. He has a sickly appearance. No distress. HENT:   Head: Normocephalic. Right Ear: Hearing normal.   Left Ear: Hearing normal.   Eyes: Right eye exhibits no discharge. Left eye exhibits no discharge. Pupils are equal.   Neck: Normal range of motion. Cardiovascular: Normal rate, regular rhythm, S1 normal, S2 normal, normal heart sounds and normal pulses. No murmur heard. Pulses:       Radial pulses are 2+ on the right side, and 2+ on the left side. Pulmonary/Chest: Effort normal and breath sounds normal. No respiratory distress. He has no wheezes. Abdominal: Soft. Normal appearance. He exhibits no distension, no fluid wave and no mass. There is no hepatomegaly. There is tenderness in the right upper quadrant and left lower quadrant. There is no rigidity, no rebound, no guarding and no CVA tenderness. No hernia. Neurological: He is alert and oriented to person, place, and time. Skin: Skin is warm, dry and intact. Capillary refill takes 2 to 3 seconds. He is not diaphoretic. Psychiatric: His behavior is normal. His affect is blunt. Nursing note and vitals reviewed. Assessment/PLan  1. Left sided abdominal pain  2. Bilious vomiting with nausea    - AFL - Deirdre Borjas MD, General Surgery, [facility]  - POCT Urinalysis No Micro (Auto)  - Urine Culture; Future  -Will send for culture given blood in urine. -Advised patient to continue Zantac QD-BID, take ibuprofen only as needed with food  -Get in with Dr. Darlene Daley ASAP given his abdominal pain location we need to r/o ulcer. May consider PPI instead of Zantac.   -Continue Zofran and Zantac for now. Call if sx worsen or do not improve.      3. Costochondral chest pain  4. Cough    -Heat/ice for muscular discomfort, Mucinex DM/ Mucinex to help thin secretions with cough and take Ibuprofen/tylenol with food for pain. -Given lack of fever, normal CXR and CBC will treat as musculoskeletal pain at this time and monitor for worsening sx. 5. Hematuria    -Will send urine to evaluate for possible infection. Kristina Yu received counseling on the following healthy behaviors: nutrition, exercise and medication adherence  Reviewed prior labs and health maintenance. Continue current medications, diet and exercise. Discussed use, benefit, and side effects of prescribed medications. Barriers to medication compliance addressed. Patient given educational materials - see patient instructions. All patient questions answered. Patient voiced understanding. Return if symptoms worsen or fail to improve.     Electronically signed by ANDREA Pierre CNP on 4/23/19 at 10:01 AM

## 2019-04-23 NOTE — ED PROVIDER NOTES
[facility] ED     Emergency Department     Faculty Attestation    I performed a history and physical examination of the patient and discussed management with the resident. I reviewed the residents note and agree with the documented findings and plan of care. Any areas of disagreement are noted on the chart. I was personally present for the key portions of any procedures. I have documented in the chart those procedures where I was not present during the key portions. I have reviewed the emergency nurses triage note. I agree with the chief complaint, past medical history, past surgical history, allergies, medications, social and family history as documented unless otherwise noted below. For Physician Assistant/ Nurse Practitioner cases/documentation I have personally evaluated this patient and have completed at least one if not all key elements of the E/M (history, physical exam, and MDM). Additional findings are as noted. Patient presents with shortness of breath and chest pain that has been worsening since this morning. He denies fever, chills, abdominal pain, nausea or vomiting. He says he has had a little bit of cough. Patient has no significant medical history. He denies drug or alcohol use. He denies any recent travel. He denies any history of blood clots. On exam, patient is lying in bed and appears mildly anxious. He is mildly tachypneic. Lungs are clear to auscultation bilaterally heart sounds are normal.  Abdomen is soft and nontender. The bilateral calves are nontender nonswollen. Will get EKG, chest x-ray, and labs and reassess.     EKG Interpretation    Interpreted by me    Rhythm: normal sinus   Rate: normal  Axis: normal  Ectopy: none  Conduction: normal  ST Segments: no acute change  T Waves: no acute change  Q Waves: none    Clinical Impression: no acute changes and normal EKG      Kianna Jolly MD  Attending Emergency  Physician              Jessica Mcmahan, MD  04/22/19 8379

## 2019-04-26 ENCOUNTER — TELEPHONE (OUTPATIENT)
Dept: FAMILY MEDICINE CLINIC | Age: 24
End: 2019-04-26

## 2019-04-26 NOTE — TELEPHONE ENCOUNTER
Please call patient and inform him that his urine culture was negative for infection.  Please ask how he is feeling/

## 2019-04-29 DIAGNOSIS — R10.9 LEFT SIDED ABDOMINAL PAIN: ICD-10-CM

## 2019-04-30 ENCOUNTER — PATIENT MESSAGE (OUTPATIENT)
Dept: FAMILY MEDICINE CLINIC | Age: 24
End: 2019-04-30

## 2019-04-30 ENCOUNTER — TELEPHONE (OUTPATIENT)
Dept: FAMILY MEDICINE CLINIC | Age: 24
End: 2019-04-30

## 2019-04-30 DIAGNOSIS — R09.89 GLOBUS SENSATION: ICD-10-CM

## 2019-04-30 DIAGNOSIS — R10.821 RIGHT UPPER QUADRANT ABDOMINAL TENDERNESS WITH REBOUND TENDERNESS: ICD-10-CM

## 2019-04-30 DIAGNOSIS — R11.0 NAUSEA: Primary | ICD-10-CM

## 2019-04-30 DIAGNOSIS — R10.9 LEFT SIDED ABDOMINAL PAIN: ICD-10-CM

## 2019-04-30 DIAGNOSIS — F41.9 ANXIETY: ICD-10-CM

## 2019-04-30 DIAGNOSIS — R13.19 OTHER DYSPHAGIA: ICD-10-CM

## 2019-04-30 NOTE — TELEPHONE ENCOUNTER
Attempted to call patient to discuss. L/m on voicemail and sent MyChart message with several questions. Also ordered CT abd/pel.

## 2019-04-30 NOTE — TELEPHONE ENCOUNTER
From: Patricio Kayser  To: Elliot Driscoll, APRN - CNP  Sent: 4/30/2019 3:34 PM EDT  Subject: Visit Follow-Up Question    Gold Servinluh Alvaradoroney still having the really severe pain in my left side and cant get into seeing the specialist until may23rd what would you recommend I feel like thats a long time for me to be as miserable as I am thanks

## 2019-05-01 ENCOUNTER — PATIENT MESSAGE (OUTPATIENT)
Dept: FAMILY MEDICINE CLINIC | Age: 24
End: 2019-05-01

## 2019-05-01 NOTE — TELEPHONE ENCOUNTER
Spoke to patient. He reports pain is still very severe and worse with eating. Pain is mostly localized in LUQ, LLQ, under lateral boarder of left rib cage and wraps slightly around left side. He has 1-2 bites of food and the pain begins. Still nauseated at times. Taking Ibuprofen intermittently for pain. Counseled on need to eat with it. He is taking all other daily medications and drinking water and gatoraid without any increase in pain. BMs have been normal consistency, but have been black at times and then turn to a mucous consistency. Headaches mentioned in My CHart msg last for 20 min, but he reports a small nose bleed that occurs within 5 min every time he's had a headache. Denies any coughing up blood or blood in urine. Denies all other urinary sx    CT orders faxed to both [address] [place] [address] and [facility] per patient's request and phone numbers provided for patient to call an schedule ASAP. Advised him that we can request they schedule him ASAP, but we cannot order it STAT in the out patient setting.

## 2019-05-01 NOTE — TELEPHONE ENCOUNTER
From: Joel Jiang  To: ANDREA Billings - CNP  Sent: 5/1/2019 3:51 PM EDT  Subject: Non-Urgent Medical Question    Got a call from scheduling today and they told me that I have to get it transferred to [address] [place] [address] because of my insureace?

## 2019-05-03 ENCOUNTER — TELEPHONE (OUTPATIENT)
Dept: FAMILY MEDICINE CLINIC | Age: 24
End: 2019-05-03

## 2019-05-03 RX ORDER — OMEPRAZOLE 20 MG/1
20 CAPSULE, DELAYED RELEASE ORAL
Qty: 30 CAPSULE | Refills: 5 | Status: SHIPPED | OUTPATIENT
Start: 2019-05-03 | End: 2019-05-28

## 2019-05-03 RX ORDER — LORAZEPAM 0.5 MG/1
0.5 TABLET ORAL EVERY 8 HOURS PRN
Qty: 30 TABLET | Refills: 0 | Status: SHIPPED | OUTPATIENT
Start: 2019-05-03 | End: 2019-05-31 | Stop reason: SDUPTHER

## 2019-05-03 NOTE — TELEPHONE ENCOUNTER
Spoke to patient per his request regarding medications, sx and request for ENT referral.     Patient states his dysphagia and globus sensation are getting worse. He would like to see an ENT. Referral put in per his request as he feels it is not related to his UGI system. Ativan refilled and Omeprazole started due to dysphagia and globus sensation possibly being secondary to worsening GERD. Patient voiced understanding of POC. Will have staff cancel his 5/10/2019 apt.

## 2019-05-07 ENCOUNTER — PATIENT MESSAGE (OUTPATIENT)
Dept: FAMILY MEDICINE CLINIC | Age: 24
End: 2019-05-07

## 2019-05-07 DIAGNOSIS — R09.89 GLOBUS SENSATION: Primary | ICD-10-CM

## 2019-05-07 DIAGNOSIS — R13.19 OTHER DYSPHAGIA: ICD-10-CM

## 2019-05-16 ENCOUNTER — TELEPHONE (OUTPATIENT)
Dept: FAMILY MEDICINE CLINIC | Age: 24
End: 2019-05-16

## 2019-05-17 ENCOUNTER — PATIENT MESSAGE (OUTPATIENT)
Dept: FAMILY MEDICINE CLINIC | Age: 24
End: 2019-05-17

## 2019-05-17 DIAGNOSIS — R13.19 OTHER DYSPHAGIA: Primary | ICD-10-CM

## 2019-05-17 DIAGNOSIS — R11.0 NAUSEA: ICD-10-CM

## 2019-05-17 DIAGNOSIS — R09.89 GLOBUS SENSATION: ICD-10-CM

## 2019-05-17 NOTE — TELEPHONE ENCOUNTER
From: Katrin Number  To: ANDREA Salas CNP  Sent: 5/17/2019 12:56 PM EDT  Subject: Non-Urgent Medical Question    ENT that accept my insurance   Arelylianetpaula Rabago     Not sure if I needed a referral or if I could just call   ----- Message -----  From: ANDREA Salas CNP  Sent: 5/17/19, 10:46 AM  To: Katrin Number  Subject: RE: Non-Urgent Medical Question    Adeline Persaud,     We received notice that you no longer had insurance through [facility] so I figured they would have called you for updated insurance information. How are you feeling? I would say we work on [employer] office. Are you on a cancellation list? How are things going with the ENT scheduling? Sean Knight     ----- Message -----   From: Katrin Vega   Sent: 5/17/2019 9:40 AM EDT   To: ANDREA Salas CNP  Subject: Non-Urgent Medical Question    Paulette Reddy I just got up here for my CT to find out it's been canceled they said it was because of insurance reason what should I do now ?

## 2019-05-17 NOTE — TELEPHONE ENCOUNTER
From: Jasiel Flores  To: Charmayne Bleak, APRN - CNP  Sent: 5/17/2019 9:40 AM EDT  Subject: Non-Urgent Medical Question    Brittaney maxwell I just got up here for my CT to find out it's been canceled they said it was because of insurance reason what should I do now ?

## 2019-05-21 ENCOUNTER — TELEPHONE (OUTPATIENT)
Dept: FAMILY MEDICINE CLINIC | Age: 24
End: 2019-05-21

## 2019-05-21 DIAGNOSIS — R09.89 GLOBUS SENSATION: ICD-10-CM

## 2019-05-21 DIAGNOSIS — R13.19 OTHER DYSPHAGIA: Primary | ICD-10-CM

## 2019-05-21 DIAGNOSIS — R11.0 NAUSEA: ICD-10-CM

## 2019-05-21 NOTE — TELEPHONE ENCOUNTER
Patient stated that the ENT that we sent the referral to can not get him in anytime soon and he stated that he would like a referral to a different one from the list he gave you    Health Maintenance   Topic Date Due    Varicella Vaccine (1 of 2 - 13+ 2-dose series) 04/16/2006    HIV screen  04/16/2008    DTaP/Tdap/Td vaccine (1 - Tdap) 04/16/2012    Flu vaccine (Season Ended) 09/01/2019    HPV vaccine  Aged Out    Pneumococcal 0-64 years Vaccine  Aged Out             (applicable per patient's age: Cancer Screenings, Depression Screening, Fall Risk Screening, Immunizations)    AST (U/L)   Date Value   09/19/2013 17     ALT (U/L)   Date Value   09/19/2013 17     BUN (mg/dL)   Date Value   04/22/2019 18      (goal A1C is < 7)   (goal LDL is <100) need 30-50% reduction from baseline     BP Readings from Last 3 Encounters:   04/23/19 (!) 142/90   04/23/19 130/79   04/03/19 122/84    (goal /80)      All Future Testing planned in [facility]:  Lab Frequency Next Occurrence   CT ABDOMEN PELVIS W IV CONTRAST Additional Contrast? Radiologist Recommendation Once 06/15/2019       Next Visit Date:  Future Appointments   Date Time Provider Rose Wolf   5/28/2019  3:45 PM [place] ANDREA Ortiz CNP [facility]   6/13/2019 10:00 AM ANDREA Ling CNP Neuro [facility]            Patient Active Problem List:     Chronic neck pain     Chronic bilateral low back pain with bilateral sciatica     Chronic thoracic spine pain

## 2019-05-28 ENCOUNTER — OFFICE VISIT (OUTPATIENT)
Dept: FAMILY MEDICINE CLINIC | Age: 24
End: 2019-05-28
Payer: MEDICAID

## 2019-05-28 VITALS
HEIGHT: 75 IN | DIASTOLIC BLOOD PRESSURE: 84 MMHG | SYSTOLIC BLOOD PRESSURE: 118 MMHG | HEART RATE: 88 BPM | BODY MASS INDEX: 25.61 KG/M2 | WEIGHT: 206 LBS

## 2019-05-28 DIAGNOSIS — R20.0 NUMBNESS AND TINGLING OF LEFT UPPER AND LOWER EXTREMITY: ICD-10-CM

## 2019-05-28 DIAGNOSIS — R20.0 NUMBNESS AND TINGLING OF RIGHT ARM AND LEG: ICD-10-CM

## 2019-05-28 DIAGNOSIS — R20.2 NUMBNESS AND TINGLING OF RIGHT ARM AND LEG: ICD-10-CM

## 2019-05-28 DIAGNOSIS — R10.9 LEFT SIDED ABDOMINAL PAIN: Primary | ICD-10-CM

## 2019-05-28 DIAGNOSIS — R20.2 NUMBNESS AND TINGLING OF LEFT UPPER AND LOWER EXTREMITY: ICD-10-CM

## 2019-05-28 DIAGNOSIS — R11.2 NAUSEA AND VOMITING, INTRACTABILITY OF VOMITING NOT SPECIFIED, UNSPECIFIED VOMITING TYPE: ICD-10-CM

## 2019-05-28 DIAGNOSIS — R10.821 RIGHT UPPER QUADRANT ABDOMINAL TENDERNESS WITH REBOUND TENDERNESS: ICD-10-CM

## 2019-05-28 DIAGNOSIS — F34.1 DYSTHYMIA: ICD-10-CM

## 2019-05-28 DIAGNOSIS — R06.02 SOB (SHORTNESS OF BREATH) ON EXERTION: ICD-10-CM

## 2019-05-28 DIAGNOSIS — K21.9 GASTROESOPHAGEAL REFLUX DISEASE, ESOPHAGITIS PRESENCE NOT SPECIFIED: ICD-10-CM

## 2019-05-28 DIAGNOSIS — R68.89 ACTIVITY INTOLERANCE: ICD-10-CM

## 2019-05-28 DIAGNOSIS — R50.81 FEVER IN OTHER DISEASES: ICD-10-CM

## 2019-05-28 PROCEDURE — 99215 OFFICE O/P EST HI 40 MIN: CPT | Performed by: NURSE PRACTITIONER

## 2019-05-28 PROCEDURE — 1036F TOBACCO NON-USER: CPT | Performed by: NURSE PRACTITIONER

## 2019-05-28 PROCEDURE — G8427 DOCREV CUR MEDS BY ELIG CLIN: HCPCS | Performed by: NURSE PRACTITIONER

## 2019-05-28 PROCEDURE — G8419 CALC BMI OUT NRM PARAM NOF/U: HCPCS | Performed by: NURSE PRACTITIONER

## 2019-05-28 RX ORDER — OMEPRAZOLE 40 MG/1
40 CAPSULE, DELAYED RELEASE ORAL
Qty: 30 CAPSULE | Refills: 5 | Status: SHIPPED | OUTPATIENT
Start: 2019-05-28 | End: 2020-09-09 | Stop reason: SDUPTHER

## 2019-05-28 RX ORDER — ONDANSETRON 4 MG/1
4 TABLET, FILM COATED ORAL DAILY PRN
Qty: 30 TABLET | Refills: 5 | Status: SHIPPED | OUTPATIENT
Start: 2019-05-28 | End: 2022-10-19 | Stop reason: ALTCHOICE

## 2019-05-28 ASSESSMENT — ENCOUNTER SYMPTOMS
ABDOMINAL PAIN: 1
DIARRHEA: 1
EYES NEGATIVE: 1
SHORTNESS OF BREATH: 1
ANAL BLEEDING: 0
FLATUS: 1
BELCHING: 1
COUGH: 0
HEMATOCHEZIA: 0
VOMITING: 1
BLOOD IN STOOL: 0
NAUSEA: 1
CONSTIPATION: 0
ALLERGIC/IMMUNOLOGIC NEGATIVE: 1
BACK PAIN: 0
ABDOMINAL DISTENTION: 1

## 2019-05-28 NOTE — PROGRESS NOTES
[facility] PHYSICIANS  LILIA DURAN [facility]  [address] [address]  Dept: [phone]    5/28/2019    CHIEF COMPLAINT    Chief Complaint   Patient presents with    Back Pain       HPI    Zully Mitchell is a 30 y.o. male who presents   Chief Complaint   Patient presents with    Back Pain     Here for follow-up on abdominal pain. Abdominal pain- see below. Dysthymia-Doing well on Wellbutrin without and side effects. Although patient reports his acute & chronic pain is making it difficult to see much improvement at this time. Anxiety/Panic Attacks- He takes a full 1 mg twice weekly on average. He would like to continue this medication, but increase his tablets from the 0.5 mg dosage to 1 mg. CP/SOB/Activity intolerance- Does not happen with anxiety, activity or GERD. He first noticed all three sx increasing since early May. Denies any leg swelling. Abdominal Pain   This is a new problem. The current episode started more than 1 year ago. The problem occurs constantly. The pain is located in the LLQ, LUQ and left flank (right upper quadrant pain very infrequently ). The quality of the pain is colicky, sharp and a sensation of fullness. The abdominal pain radiates to the left flank. Associated symptoms include anorexia, belching, diarrhea (Started 5/28 am ), a fever, flatus, headaches, nausea and vomiting. Pertinent negatives include no constipation, hematochezia or hematuria. The pain is aggravated by deep breathing, vomiting, movement and eating. The pain is relieved by recumbency. He has tried antacids, H2 blockers, proton pump inhibitors and acetaminophen for the symptoms. The treatment provided no relief. His past medical history is significant for GERD. There is no history of abdominal surgery, gallstones, irritable bowel syndrome, pancreatitis or ulcerative colitis.      Vitals:    05/28/19 1607 05/28/19 1732   BP: (!) 138/92 118/84 Pulse: 88    Weight: 206 lb (93.4 kg)    Height: 6' 3\" (1.905 m)      REVIEW OF SYSTEMS    Review of Systems   Constitutional: Positive for appetite change, chills, diaphoresis, fatigue and fever. HENT: Negative. Eyes: Negative. Negative for visual disturbance. Respiratory: Positive for shortness of breath. Negative for cough. Increasing activity intolerance since early May    Cardiovascular: Positive for chest pain and palpitations (With SOB increase since may). Gastrointestinal: Positive for abdominal distention, abdominal pain, anorexia, diarrhea (Started 5/28 am ), flatus, nausea and vomiting. Negative for anal bleeding, blood in stool, constipation and hematochezia. Denies hemoptysis    Endocrine: Negative. Genitourinary: Negative. Negative for difficulty urinating, flank pain and hematuria. Musculoskeletal: Negative. Negative for back pain. Skin: Negative. Negative for rash. Allergic/Immunologic: Negative. Neurological: Positive for dizziness and headaches. Intermittent dizziness and HA    Hematological: Negative. Negative for adenopathy. Psychiatric/Behavioral: Negative. Negative for dysphoric mood. The patient is not nervous/anxious.       PAST MEDICAL HISTORY    Past Medical History:   Diagnosis Date    Chronic back pain 2012    Undescended right testes 2011     FAMILY HISTORY    Family History   Problem Relation Age of Onset    High Blood Pressure Mother     Heart Disease Mother     Heart Attack Mother 40    Cancer Father         unknown kind     Heart Attack Maternal Grandmother 61        COD     Other Sister         POTS     Heart Attack Maternal Grandfather 48    Prostate Cancer Maternal Grandfather     Stroke Maternal Grandfather         2000    No Known Problems Paternal Grandmother     No Known Problems Paternal Grandfather      SOCIAL HISTORY    Social History     Socioeconomic History    Marital status: Single     Spouse name: None MG tablet Take 1 tablet by mouth 2 times daily as needed for Heartburn 60 tablet 6    LORazepam (ATIVAN) 1 MG tablet Take 1 tablet by mouth every 8 hours as needed for Anxiety for up to 30 days. 30 tablet 0     No current facility-administered medications for this visit. ALLERGIES    Allergies   Allergen Reactions    Rondec Hives and Nausea And Vomiting       PHYSICAL EXAM   Physical Exam   Constitutional: He is oriented to person, place, and time. Vital signs are normal. He appears well-developed and well-nourished. He is cooperative. No distress. HENT:   Head: Normocephalic. Right Ear: Hearing normal.   Left Ear: Hearing normal.   Eyes: Right eye exhibits no discharge. Left eye exhibits no discharge. Pupils are equal.   Neck: Normal range of motion. Cardiovascular: Normal rate, regular rhythm, S1 normal, S2 normal, normal heart sounds and normal pulses. No murmur heard. Pulses:       Radial pulses are 2+ on the right side, and 2+ on the left side. Pulmonary/Chest: Effort normal and breath sounds normal. No respiratory distress. He has no wheezes. Abdominal: Soft. Normal appearance. He exhibits distension. There is tenderness in the left upper quadrant and left lower quadrant. There is no rigidity and no CVA tenderness. Neurological: He is alert and oriented to person, place, and time. Skin: Skin is warm and dry. He is not diaphoretic. Psychiatric: His speech is normal and behavior is normal. Thought content normal. His mood appears not anxious. His affect is blunt. He is not hyperactive. He exhibits a depressed mood. He expresses no homicidal and no suicidal ideation. He expresses no suicidal plans and no homicidal plans. Nursing note and vitals reviewed. Assessment/PLan  1. Left sided abdominal pain  2. Right upper quadrant abdominal tenderness with rebound tenderness  3.  Nausea and vomiting, intractability of vomiting not specified, unspecified vomiting type    - CT ABDOMEN PELVIS W IV CONTRAST Additional Contrast? Radiologist Recommendation; Future  - Marielos Junior MD, Gastroenterology, [facility]  - Amylase; Future  - Lipase; Future  - CBC With Auto Differential; Future  - Comprehensive Metabolic Panel; Future  - ondansetron (ZOFRAN) 4 MG tablet; Take 1 tablet by mouth daily as needed for Nausea or Vomiting  Dispense: 30 tablet; Refill: 5  -Proceed with above testing, set up an appointment with GI for additional evaluation and scope to determine potential cause of pain/n/v/globus sensation and dysphagia. 4. Fever in other diseases    - CBC With Auto Differential; Future    5. Gastroesophageal reflux disease, esophagitis presence not specified    - omeprazole (PRILOSEC) 40 MG delayed release capsule; Take 1 capsule by mouth every morning (before breakfast)  Dispense: 30 capsule; Refill: 5  - Marielos Junior MD, Gastroenterology, [facility]  -Chronic, stable, continue current medications. 6. SOB (shortness of breath) on exertion  7. Activity intolerance    -Screening D-Dimer to ensure that patient does not have a PE.   -Advised patient that these sx may also be due to deconditioning due to his inability to get out of bed and ambulate due to chronic back pain and recent acute abd pain. 8. Numbness and tingling of left upper and lower extremity  9. Numbness and tingling of right arm and leg    -Continue as planned to see neuro for evaluation of new above sx. 10. Dysthymia    -Chronic, stable, continue current medications. I have spent 40 minutes face to face with the patient more than 50 % if this time was spent counseling and coordinating care. Nhi Cardenas received counseling on the following healthy behaviors: nutrition, exercise and medication adherence  Reviewed prior labs and health maintenance  Continue current medications, diet and exercise. Discussed use, benefit, and side effects of prescribed medications.  Barriers to medication compliance

## 2019-05-30 ENCOUNTER — PATIENT MESSAGE (OUTPATIENT)
Dept: FAMILY MEDICINE CLINIC | Age: 24
End: 2019-05-30

## 2019-05-30 ENCOUNTER — HOSPITAL ENCOUNTER (OUTPATIENT)
Age: 24
Discharge: HOME | End: 2019-05-30
Payer: MEDICAID

## 2019-05-30 DIAGNOSIS — R10.821 RIGHT UPPER QUADRANT ABDOMINAL TENDERNESS WITH REBOUND TENDERNESS: ICD-10-CM

## 2019-05-30 DIAGNOSIS — R11.2 NAUSEA AND VOMITING, INTRACTABILITY OF VOMITING NOT SPECIFIED, UNSPECIFIED VOMITING TYPE: ICD-10-CM

## 2019-05-30 DIAGNOSIS — R10.9 LEFT SIDED ABDOMINAL PAIN: ICD-10-CM

## 2019-05-30 DIAGNOSIS — R50.81 FEVER IN OTHER DISEASES: ICD-10-CM

## 2019-05-30 DIAGNOSIS — R06.02 SOB (SHORTNESS OF BREATH) ON EXERTION: ICD-10-CM

## 2019-05-30 LAB
ABSOLUTE EOS #: 0.07 K/UL (ref 0–0.44)
ABSOLUTE IMMATURE GRANULOCYTE: <0.03 K/UL (ref 0–0.3)
ABSOLUTE LYMPH #: 2.31 K/UL (ref 1.1–3.7)
ABSOLUTE MONO #: 0.65 K/UL (ref 0.1–1.2)
ALBUMIN SERPL-MCNC: 4.8 G/DL (ref 3.5–5.2)
ALBUMIN/GLOBULIN RATIO: 1.8 (ref 1–2.5)
ALP BLD-CCNC: 56 U/L (ref 40–129)
ALT SERPL-CCNC: 19 U/L (ref 5–41)
AMYLASE: 54 U/L (ref 28–100)
ANION GAP SERPL CALCULATED.3IONS-SCNC: 14 MMOL/L (ref 9–17)
AST SERPL-CCNC: 17 U/L
BASOPHILS # BLD: 0 % (ref 0–2)
BASOPHILS ABSOLUTE: <0.03 K/UL (ref 0–0.2)
BILIRUB SERPL-MCNC: 0.29 MG/DL (ref 0.3–1.2)
BUN BLDV-MCNC: 17 MG/DL (ref 6–20)
BUN/CREAT BLD: ABNORMAL (ref 9–20)
CALCIUM SERPL-MCNC: 9.9 MG/DL (ref 8.6–10.4)
CHLORIDE BLD-SCNC: 101 MMOL/L (ref 98–107)
CO2: 25 MMOL/L (ref 20–31)
CREAT SERPL-MCNC: 0.85 MG/DL (ref 0.7–1.2)
D-DIMER QUANTITATIVE: <0.17 MG/L FEU
DIFFERENTIAL TYPE: NORMAL
EOSINOPHILS RELATIVE PERCENT: 1 % (ref 1–4)
GFR AFRICAN AMERICAN: >60 ML/MIN
GFR NON-AFRICAN AMERICAN: >60 ML/MIN
GFR SERPL CREATININE-BSD FRML MDRD: ABNORMAL ML/MIN/{1.73_M2}
GFR SERPL CREATININE-BSD FRML MDRD: ABNORMAL ML/MIN/{1.73_M2}
GLUCOSE BLD-MCNC: 105 MG/DL (ref 70–99)
HCT VFR BLD CALC: 46.2 % (ref 40.7–50.3)
HEMOGLOBIN: 15 G/DL (ref 13–17)
IMMATURE GRANULOCYTES: 0 %
LIPASE: 23 U/L (ref 13–60)
LYMPHOCYTES # BLD: 32 % (ref 24–43)
MCH RBC QN AUTO: 30.2 PG (ref 25.2–33.5)
MCHC RBC AUTO-ENTMCNC: 32.5 G/DL (ref 28.4–34.8)
MCV RBC AUTO: 93 FL (ref 82.6–102.9)
MONOCYTES # BLD: 9 % (ref 3–12)
NRBC AUTOMATED: 0 PER 100 WBC
PDW BLD-RTO: 13 % (ref 11.8–14.4)
PLATELET # BLD: 328 K/UL (ref 138–453)
PLATELET ESTIMATE: NORMAL
PMV BLD AUTO: 10.5 FL (ref 8.1–13.5)
POTASSIUM SERPL-SCNC: 4.5 MMOL/L (ref 3.7–5.3)
RBC # BLD: 4.97 M/UL (ref 4.21–5.77)
RBC # BLD: NORMAL 10*6/UL
SEG NEUTROPHILS: 57 % (ref 36–65)
SEGMENTED NEUTROPHILS ABSOLUTE COUNT: 4.06 K/UL (ref 1.5–8.1)
SODIUM BLD-SCNC: 140 MMOL/L (ref 135–144)
TOTAL PROTEIN: 7.4 G/DL (ref 6.4–8.3)
WBC # BLD: 7.1 K/UL (ref 3.5–11.3)
WBC # BLD: NORMAL 10*3/UL

## 2019-05-30 PROCEDURE — 83690 ASSAY OF LIPASE: CPT

## 2019-05-30 PROCEDURE — 85025 COMPLETE CBC W/AUTO DIFF WBC: CPT

## 2019-05-30 PROCEDURE — 85379 FIBRIN DEGRADATION QUANT: CPT

## 2019-05-30 PROCEDURE — 82150 ASSAY OF AMYLASE: CPT

## 2019-05-30 PROCEDURE — 80053 COMPREHEN METABOLIC PANEL: CPT

## 2019-05-30 PROCEDURE — 36415 COLL VENOUS BLD VENIPUNCTURE: CPT

## 2019-05-30 NOTE — TELEPHONE ENCOUNTER
From: Matilde Prado  To: Sunday ANDREA Rojas CNP  Sent: 5/30/2019 4:07 PM EDT  Subject: Non-Urgent Medical Question    Dominique Serum I got my labs done today and my CT and US are scheduled for June 5th just wanted to let you know what's going on

## 2019-06-02 ENCOUNTER — PATIENT MESSAGE (OUTPATIENT)
Dept: FAMILY MEDICINE CLINIC | Age: 24
End: 2019-06-02

## 2019-06-02 DIAGNOSIS — F34.1 DYSTHYMIA: ICD-10-CM

## 2019-06-05 ENCOUNTER — HOSPITAL ENCOUNTER (OUTPATIENT)
Dept: CT IMAGING | Age: 24
Discharge: HOME | End: 2019-06-07
Payer: MEDICAID

## 2019-06-05 ENCOUNTER — HOSPITAL ENCOUNTER (OUTPATIENT)
Dept: ULTRASOUND IMAGING | Age: 24
Discharge: HOME | End: 2019-06-07
Payer: MEDICAID

## 2019-06-05 DIAGNOSIS — R11.2 NAUSEA AND VOMITING, INTRACTABILITY OF VOMITING NOT SPECIFIED, UNSPECIFIED VOMITING TYPE: ICD-10-CM

## 2019-06-05 DIAGNOSIS — R10.821 RIGHT UPPER QUADRANT ABDOMINAL TENDERNESS WITH REBOUND TENDERNESS: ICD-10-CM

## 2019-06-05 DIAGNOSIS — R10.9 LEFT SIDED ABDOMINAL PAIN: ICD-10-CM

## 2019-06-05 PROCEDURE — 2580000003 HC RX 258: Performed by: NURSE PRACTITIONER

## 2019-06-05 PROCEDURE — 76705 ECHO EXAM OF ABDOMEN: CPT

## 2019-06-05 PROCEDURE — 6360000004 HC RX CONTRAST MEDICATION: Performed by: NURSE PRACTITIONER

## 2019-06-05 PROCEDURE — 74177 CT ABD & PELVIS W/CONTRAST: CPT

## 2019-06-05 RX ORDER — SODIUM CHLORIDE 0.9 % (FLUSH) 0.9 %
10 SYRINGE (ML) INJECTION
Status: COMPLETED | OUTPATIENT
Start: 2019-06-05 | End: 2019-06-05

## 2019-06-05 RX ORDER — BUPROPION HYDROCHLORIDE 150 MG/1
TABLET ORAL
Qty: 60 TABLET | Refills: 1 | Status: CANCELLED | OUTPATIENT
Start: 2019-06-05 | End: 2019-07-19

## 2019-06-05 RX ORDER — 0.9 % SODIUM CHLORIDE 0.9 %
50 INTRAVENOUS SOLUTION INTRAVENOUS ONCE
Status: COMPLETED | OUTPATIENT
Start: 2019-06-05 | End: 2019-06-05

## 2019-06-05 RX ADMIN — IOPAMIDOL 75 ML: 755 INJECTION, SOLUTION INTRAVENOUS at 17:02

## 2019-06-05 RX ADMIN — IOHEXOL 50 ML: 240 INJECTION, SOLUTION INTRATHECAL; INTRAVASCULAR; INTRAVENOUS; ORAL at 17:02

## 2019-06-05 RX ADMIN — SODIUM CHLORIDE 50 ML: 0.9 INJECTION, SOLUTION INTRAVENOUS at 17:04

## 2019-06-05 RX ADMIN — Medication 10 ML: at 17:03

## 2019-06-06 ENCOUNTER — PATIENT MESSAGE (OUTPATIENT)
Dept: FAMILY MEDICINE CLINIC | Age: 24
End: 2019-06-06

## 2019-06-06 ENCOUNTER — TELEPHONE (OUTPATIENT)
Dept: FAMILY MEDICINE CLINIC | Age: 24
End: 2019-06-06

## 2019-06-06 RX ORDER — BUPROPION HYDROCHLORIDE 300 MG/1
300 TABLET ORAL EVERY MORNING
Qty: 30 TABLET | Refills: 5 | Status: SHIPPED | OUTPATIENT
Start: 2019-06-06 | End: 2019-10-29 | Stop reason: ALTCHOICE

## 2019-06-06 NOTE — TELEPHONE ENCOUNTER
From: Matilde Prado  To: Sunday ANDREA Rojas - CNP  Sent: 6/6/2019 12:56 PM EDT  Subject: Non-Urgent Medical Question    Yes if you wouldn't mind giving me a call when you to explain a little better that would be awesome thank you hope your appointment goes well

## 2019-06-06 NOTE — TELEPHONE ENCOUNTER
Dr. Greg Gray doesn't take his insurance  Dr. Agnieszka Abraham out of office  Next Tuesday a 3p is earliest with   Patient has been given information and result faxed to Dr. Neena Fitzpatrick

## 2019-06-07 ENCOUNTER — PATIENT MESSAGE (OUTPATIENT)
Dept: FAMILY MEDICINE CLINIC | Age: 24
End: 2019-06-07

## 2019-06-07 NOTE — TELEPHONE ENCOUNTER
From: Stewart Lacey  To: ANDREA Leger CNP  Sent: 6/7/2019 12:35 PM EDT  Subject: < No Subject>    she told me that it said my phone was off just wanted to confirm you were calling [phone] instead of [phone] a lot of people mix that up thank you for letting me know I'll be by my phone all night and she will be here around [phone]    ----- Message -----  From: ANDREA Leger CNP  Sent: 6/6/19, 10:23 PM  To: Stewart Lacey  Subject:     Brenda Son,     I didn't want you to think I forgot to call you, but I tried to call you on Huyen's phone when she and I talked about trying to connect and I missed you again. I called about 9:30, but wasn't able to leave a voicemail since her mailbox was full. I will try to call you again tomorrow sometime after I'm done seeing patients.      Take Care,  Harry Adams

## 2019-07-03 ENCOUNTER — TELEPHONE (OUTPATIENT)
Dept: GASTROENTEROLOGY | Age: 24
End: 2019-07-03

## 2019-07-03 NOTE — TELEPHONE ENCOUNTER
Lvm to callback and schedule appt from referral - 1st attempt  Letter sent to call and schedule appt from referral - 2nd attempt

## 2019-07-24 ENCOUNTER — TELEPHONE (OUTPATIENT)
Dept: GASTROENTEROLOGY | Age: 24
End: 2019-07-24

## 2019-10-29 ENCOUNTER — HOSPITAL ENCOUNTER (OUTPATIENT)
Age: 24
Setting detail: SPECIMEN
Discharge: HOME | End: 2019-10-29
Payer: MEDICAID

## 2019-10-29 ENCOUNTER — OFFICE VISIT (OUTPATIENT)
Dept: FAMILY MEDICINE CLINIC | Age: 24
End: 2019-10-29
Payer: MEDICAID

## 2019-10-29 ENCOUNTER — PATIENT MESSAGE (OUTPATIENT)
Dept: FAMILY MEDICINE CLINIC | Age: 24
End: 2019-10-29

## 2019-10-29 ENCOUNTER — TELEPHONE (OUTPATIENT)
Dept: FAMILY MEDICINE CLINIC | Age: 24
End: 2019-10-29

## 2019-10-29 VITALS
SYSTOLIC BLOOD PRESSURE: 134 MMHG | DIASTOLIC BLOOD PRESSURE: 84 MMHG | BODY MASS INDEX: 32.25 KG/M2 | WEIGHT: 258 LBS | HEART RATE: 80 BPM

## 2019-10-29 DIAGNOSIS — M54.42 CHRONIC LOW BACK PAIN WITH BILATERAL SCIATICA, UNSPECIFIED BACK PAIN LATERALITY: ICD-10-CM

## 2019-10-29 DIAGNOSIS — M25.562 CHRONIC PAIN OF BOTH KNEES: Primary | ICD-10-CM

## 2019-10-29 DIAGNOSIS — M54.41 CHRONIC LOW BACK PAIN WITH BILATERAL SCIATICA, UNSPECIFIED BACK PAIN LATERALITY: ICD-10-CM

## 2019-10-29 DIAGNOSIS — G89.29 CHRONIC THORACIC SPINE PAIN: ICD-10-CM

## 2019-10-29 DIAGNOSIS — G89.29 CHRONIC PAIN OF BOTH KNEES: ICD-10-CM

## 2019-10-29 DIAGNOSIS — F41.9 ANXIETY: ICD-10-CM

## 2019-10-29 DIAGNOSIS — G89.29 CHRONIC NECK PAIN: ICD-10-CM

## 2019-10-29 DIAGNOSIS — M25.562 CHRONIC PAIN OF BOTH KNEES: ICD-10-CM

## 2019-10-29 DIAGNOSIS — M54.6 CHRONIC THORACIC SPINE PAIN: ICD-10-CM

## 2019-10-29 DIAGNOSIS — F34.1 DYSTHYMIA: ICD-10-CM

## 2019-10-29 DIAGNOSIS — R13.19 OTHER DYSPHAGIA: ICD-10-CM

## 2019-10-29 DIAGNOSIS — M25.561 CHRONIC PAIN OF BOTH KNEES: ICD-10-CM

## 2019-10-29 DIAGNOSIS — M54.2 CHRONIC NECK PAIN: ICD-10-CM

## 2019-10-29 DIAGNOSIS — R09.89 GLOBUS SENSATION: ICD-10-CM

## 2019-10-29 DIAGNOSIS — G89.29 CHRONIC LOW BACK PAIN WITH BILATERAL SCIATICA, UNSPECIFIED BACK PAIN LATERALITY: ICD-10-CM

## 2019-10-29 DIAGNOSIS — M25.561 CHRONIC PAIN OF BOTH KNEES: Primary | ICD-10-CM

## 2019-10-29 DIAGNOSIS — G89.29 CHRONIC PAIN OF BOTH KNEES: Primary | ICD-10-CM

## 2019-10-29 DIAGNOSIS — R31.29 OTHER MICROSCOPIC HEMATURIA: ICD-10-CM

## 2019-10-29 LAB
RHEUMATOID FACTOR: <10 IU/ML
SEDIMENTATION RATE, ERYTHROCYTE: 1 MM (ref 0–10)

## 2019-10-29 PROCEDURE — G8417 CALC BMI ABV UP PARAM F/U: HCPCS | Performed by: NURSE PRACTITIONER

## 2019-10-29 PROCEDURE — 99214 OFFICE O/P EST MOD 30 MIN: CPT | Performed by: NURSE PRACTITIONER

## 2019-10-29 PROCEDURE — G8427 DOCREV CUR MEDS BY ELIG CLIN: HCPCS | Performed by: NURSE PRACTITIONER

## 2019-10-29 PROCEDURE — 1036F TOBACCO NON-USER: CPT | Performed by: NURSE PRACTITIONER

## 2019-10-29 PROCEDURE — G8484 FLU IMMUNIZE NO ADMIN: HCPCS | Performed by: NURSE PRACTITIONER

## 2019-10-29 PROCEDURE — 36415 COLL VENOUS BLD VENIPUNCTURE: CPT | Performed by: NURSE PRACTITIONER

## 2019-10-29 RX ORDER — GABAPENTIN 300 MG/1
300 CAPSULE ORAL 2 TIMES DAILY
Qty: 180 CAPSULE | Refills: 1 | Status: CANCELLED | OUTPATIENT
Start: 2019-10-29 | End: 2020-04-26

## 2019-10-29 ASSESSMENT — ENCOUNTER SYMPTOMS
NAUSEA: 0
RESPIRATORY NEGATIVE: 1
EYES NEGATIVE: 1
DIARRHEA: 0
BACK PAIN: 1
SHORTNESS OF BREATH: 0
ABDOMINAL PAIN: 0
CONSTIPATION: 0
VOMITING: 0
COUGH: 0

## 2019-10-30 RX ORDER — TIZANIDINE 2 MG/1
2 TABLET ORAL NIGHTLY PRN
Qty: 30 TABLET | Refills: 1 | Status: SHIPPED | OUTPATIENT
Start: 2019-10-30 | End: 2020-01-07 | Stop reason: ALTCHOICE

## 2019-10-30 RX ORDER — DULOXETIN HYDROCHLORIDE 30 MG/1
CAPSULE, DELAYED RELEASE ORAL
Qty: 60 CAPSULE | Refills: 1 | Status: SHIPPED | OUTPATIENT
Start: 2019-10-30 | End: 2020-01-07

## 2019-10-31 ENCOUNTER — PATIENT MESSAGE (OUTPATIENT)
Dept: FAMILY MEDICINE CLINIC | Age: 24
End: 2019-10-31

## 2019-11-26 ENCOUNTER — OFFICE VISIT (OUTPATIENT)
Dept: FAMILY MEDICINE CLINIC | Age: 24
End: 2019-11-26
Payer: MEDICAID

## 2019-11-26 VITALS
SYSTOLIC BLOOD PRESSURE: 120 MMHG | DIASTOLIC BLOOD PRESSURE: 80 MMHG | HEART RATE: 78 BPM | BODY MASS INDEX: 33 KG/M2 | WEIGHT: 264 LBS

## 2019-11-26 DIAGNOSIS — F41.9 ANXIETY: ICD-10-CM

## 2019-11-26 DIAGNOSIS — G47.09 OTHER INSOMNIA: ICD-10-CM

## 2019-11-26 DIAGNOSIS — F34.1 DYSTHYMIA: ICD-10-CM

## 2019-11-26 DIAGNOSIS — R13.19 OTHER DYSPHAGIA: ICD-10-CM

## 2019-11-26 DIAGNOSIS — R09.89 GLOBUS SENSATION: ICD-10-CM

## 2019-11-26 DIAGNOSIS — M25.561 CHRONIC PAIN OF BOTH KNEES: Primary | ICD-10-CM

## 2019-11-26 DIAGNOSIS — M25.562 CHRONIC PAIN OF BOTH KNEES: Primary | ICD-10-CM

## 2019-11-26 DIAGNOSIS — G89.29 CHRONIC PAIN OF BOTH KNEES: Primary | ICD-10-CM

## 2019-11-26 PROCEDURE — G8484 FLU IMMUNIZE NO ADMIN: HCPCS | Performed by: NURSE PRACTITIONER

## 2019-11-26 PROCEDURE — 99214 OFFICE O/P EST MOD 30 MIN: CPT | Performed by: NURSE PRACTITIONER

## 2019-11-26 PROCEDURE — 1036F TOBACCO NON-USER: CPT | Performed by: NURSE PRACTITIONER

## 2019-11-26 PROCEDURE — G8417 CALC BMI ABV UP PARAM F/U: HCPCS | Performed by: NURSE PRACTITIONER

## 2019-11-26 PROCEDURE — G8427 DOCREV CUR MEDS BY ELIG CLIN: HCPCS | Performed by: NURSE PRACTITIONER

## 2019-11-26 RX ORDER — TEMAZEPAM 15 MG/1
15 CAPSULE ORAL NIGHTLY PRN
Qty: 14 CAPSULE | Refills: 0 | Status: CANCELLED | OUTPATIENT
Start: 2019-11-26 | End: 2019-12-10

## 2019-11-26 ASSESSMENT — ENCOUNTER SYMPTOMS
SHORTNESS OF BREATH: 0
VOMITING: 0
DIARRHEA: 0
GASTROINTESTINAL NEGATIVE: 1
EYES NEGATIVE: 1
ALLERGIC/IMMUNOLOGIC NEGATIVE: 1
COUGH: 0
ABDOMINAL PAIN: 0
RESPIRATORY NEGATIVE: 1
NAUSEA: 0

## 2019-11-27 ENCOUNTER — OFFICE VISIT (OUTPATIENT)
Dept: ORTHOPEDIC SURGERY | Age: 24
End: 2019-11-27
Payer: MEDICAID

## 2019-11-27 VITALS
SYSTOLIC BLOOD PRESSURE: 123 MMHG | DIASTOLIC BLOOD PRESSURE: 89 MMHG | HEART RATE: 82 BPM | WEIGHT: 263.89 LBS | BODY MASS INDEX: 32.81 KG/M2 | HEIGHT: 75 IN

## 2019-11-27 DIAGNOSIS — M22.2X2 BILATERAL PATELLOFEMORAL SYNDROME: Primary | ICD-10-CM

## 2019-11-27 DIAGNOSIS — M22.2X1 BILATERAL PATELLOFEMORAL SYNDROME: Primary | ICD-10-CM

## 2019-11-27 PROCEDURE — G8484 FLU IMMUNIZE NO ADMIN: HCPCS | Performed by: FAMILY MEDICINE

## 2019-11-27 PROCEDURE — 1036F TOBACCO NON-USER: CPT | Performed by: FAMILY MEDICINE

## 2019-11-27 PROCEDURE — G8427 DOCREV CUR MEDS BY ELIG CLIN: HCPCS | Performed by: FAMILY MEDICINE

## 2019-11-27 PROCEDURE — 99203 OFFICE O/P NEW LOW 30 MIN: CPT | Performed by: FAMILY MEDICINE

## 2019-11-27 PROCEDURE — G8417 CALC BMI ABV UP PARAM F/U: HCPCS | Performed by: FAMILY MEDICINE

## 2019-11-29 RX ORDER — TRAZODONE HYDROCHLORIDE 50 MG/1
TABLET ORAL
Qty: 60 TABLET | Refills: 1 | Status: SHIPPED | OUTPATIENT
Start: 2019-11-29 | End: 2020-01-07 | Stop reason: SDUPTHER

## 2019-12-11 ENCOUNTER — PATIENT MESSAGE (OUTPATIENT)
Dept: FAMILY MEDICINE CLINIC | Age: 24
End: 2019-12-11

## 2019-12-11 ASSESSMENT — ENCOUNTER SYMPTOMS
BACK PAIN: 1
CONSTIPATION: 0

## 2019-12-19 ENCOUNTER — TELEPHONE (OUTPATIENT)
Dept: ORTHOPEDIC SURGERY | Age: 24
End: 2019-12-19

## 2019-12-19 DIAGNOSIS — M22.2X1 BILATERAL PATELLOFEMORAL SYNDROME: Primary | ICD-10-CM

## 2019-12-19 DIAGNOSIS — M22.2X2 BILATERAL PATELLOFEMORAL SYNDROME: Primary | ICD-10-CM

## 2020-01-07 ENCOUNTER — OFFICE VISIT (OUTPATIENT)
Dept: FAMILY MEDICINE CLINIC | Age: 25
End: 2020-01-07
Payer: MEDICAID

## 2020-01-07 VITALS
WEIGHT: 271 LBS | SYSTOLIC BLOOD PRESSURE: 130 MMHG | HEIGHT: 75 IN | DIASTOLIC BLOOD PRESSURE: 70 MMHG | HEART RATE: 80 BPM | BODY MASS INDEX: 33.69 KG/M2

## 2020-01-07 PROCEDURE — 99214 OFFICE O/P EST MOD 30 MIN: CPT | Performed by: NURSE PRACTITIONER

## 2020-01-07 PROCEDURE — 1036F TOBACCO NON-USER: CPT | Performed by: NURSE PRACTITIONER

## 2020-01-07 PROCEDURE — G8427 DOCREV CUR MEDS BY ELIG CLIN: HCPCS | Performed by: NURSE PRACTITIONER

## 2020-01-07 PROCEDURE — G8417 CALC BMI ABV UP PARAM F/U: HCPCS | Performed by: NURSE PRACTITIONER

## 2020-01-07 PROCEDURE — G8484 FLU IMMUNIZE NO ADMIN: HCPCS | Performed by: NURSE PRACTITIONER

## 2020-01-07 RX ORDER — TRAZODONE HYDROCHLORIDE 50 MG/1
50 TABLET ORAL NIGHTLY
Qty: 30 TABLET | Refills: 5 | Status: SHIPPED | OUTPATIENT
Start: 2020-01-07 | End: 2020-11-02 | Stop reason: CLARIF

## 2020-01-07 RX ORDER — FAMOTIDINE 20 MG/1
20 TABLET, FILM COATED ORAL 2 TIMES DAILY PRN
Qty: 60 TABLET | Refills: 5 | Status: SHIPPED | OUTPATIENT
Start: 2020-01-07 | End: 2020-06-15 | Stop reason: SDUPTHER

## 2020-01-07 ASSESSMENT — PATIENT HEALTH QUESTIONNAIRE - PHQ9
SUM OF ALL RESPONSES TO PHQ9 QUESTIONS 1 & 2: 0
SUM OF ALL RESPONSES TO PHQ QUESTIONS 1-9: 0
SUM OF ALL RESPONSES TO PHQ QUESTIONS 1-9: 0
2. FEELING DOWN, DEPRESSED OR HOPELESS: 0
1. LITTLE INTEREST OR PLEASURE IN DOING THINGS: 0

## 2020-01-07 NOTE — PROGRESS NOTES
years: 6.00     Types: Cigarettes     Last attempt to quit: 2018     Years since quittin.6    Smokeless tobacco: Never Used   Substance and Sexual Activity    Alcohol use: Yes     Comment: Occasionally     Drug use: No    Sexual activity: Yes     Partners: Female   Lifestyle    Physical activity:     Days per week: None     Minutes per session: None    Stress: None   Relationships    Social connections:     Talks on phone: None     Gets together: None     Attends Confucianist service: None     Active member of club or organization: None     Attends meetings of clubs or organizations: None     Relationship status: None    Intimate partner violence:     Fear of current or ex partner: None     Emotionally abused: None     Physically abused: None     Forced sexual activity: None   Other Topics Concern    None   Social History Narrative    None       SURGICAL HISTORY    Past Surgical History:   Procedure Laterality Date    TESTICLE SURGERY Right 2011    undecended testicle operation     TYMPANOSTOMY TUBE PLACEMENT         CURRENT MEDICATIONS    Current Outpatient Medications   Medication Sig Dispense Refill    traZODone (DESYREL) 50 MG tablet Take 1 tablet by mouth nightly 30 tablet 5    famotidine (PEPCID) 20 MG tablet Take 1 tablet by mouth 2 times daily as needed (acid reflux) 60 tablet 5    Handicap Placard MISC by Does not apply route Expires 5 years from original written date 1 each 0    omeprazole (PRILOSEC) 40 MG delayed release capsule Take 1 capsule by mouth every morning (before breakfast) 30 capsule 5    ondansetron (ZOFRAN) 4 MG tablet Take 1 tablet by mouth daily as needed for Nausea or Vomiting 30 tablet 5     No current facility-administered medications for this visit. ALLERGIES    Allergies   Allergen Reactions    Rondec Hives and Nausea And Vomiting       PHYSICAL EXAM   Physical Exam  Vitals signs and nursing note reviewed.    Constitutional:       General: He is not in with dx if diabetes is 50% LDL reduction)      PHQ Scores 1/7/2020 3/26/2019   PHQ2 Score 0 0   PHQ9 Score 0 0     Interpretation of Total Score Depression Severity: 1-4 = Minimal depression, 5-9 = Mild depression, 10-14 = Moderate depression, 15-19 = Moderately severe depression, 20-27 = Severe depression     Return in about 6 months (around 7/7/2020).     Electronically signed by ANDREA Quijano CNP on 1/7/20 at 4:51 PM

## 2020-01-18 ASSESSMENT — ENCOUNTER SYMPTOMS
GASTROINTESTINAL NEGATIVE: 1
ABDOMINAL PAIN: 0
SHORTNESS OF BREATH: 0
DIARRHEA: 0
VOMITING: 0
BACK PAIN: 0
CONSTIPATION: 0
RESPIRATORY NEGATIVE: 1
NAUSEA: 0
ALLERGIC/IMMUNOLOGIC NEGATIVE: 1
COUGH: 0
EYES NEGATIVE: 1

## 2020-06-15 ENCOUNTER — OFFICE VISIT (OUTPATIENT)
Dept: FAMILY MEDICINE CLINIC | Age: 25
End: 2020-06-15
Payer: MEDICAID

## 2020-06-15 VITALS
HEIGHT: 75 IN | WEIGHT: 283 LBS | TEMPERATURE: 98.1 F | BODY MASS INDEX: 35.19 KG/M2 | HEART RATE: 97 BPM | DIASTOLIC BLOOD PRESSURE: 80 MMHG | OXYGEN SATURATION: 99 % | SYSTOLIC BLOOD PRESSURE: 120 MMHG

## 2020-06-15 LAB
APPEARANCE FLUID: NORMAL
BILIRUBIN, POC: NEGATIVE
BLOOD URINE, POC: NORMAL
CLARITY, POC: NORMAL
COLOR, POC: NORMAL
GLUCOSE URINE, POC: NEGATIVE
KETONES, POC: NEGATIVE
LEUKOCYTE EST, POC: NEGATIVE
NITRITE, POC: NEGATIVE
PH, POC: 6.5
PROTEIN, POC: NEGATIVE
SPECIFIC GRAVITY, POC: 1.02
UROBILINOGEN, POC: 0.2

## 2020-06-15 PROCEDURE — G8417 CALC BMI ABV UP PARAM F/U: HCPCS | Performed by: NURSE PRACTITIONER

## 2020-06-15 PROCEDURE — 81002 URINALYSIS NONAUTO W/O SCOPE: CPT | Performed by: NURSE PRACTITIONER

## 2020-06-15 PROCEDURE — 1036F TOBACCO NON-USER: CPT | Performed by: NURSE PRACTITIONER

## 2020-06-15 PROCEDURE — G8427 DOCREV CUR MEDS BY ELIG CLIN: HCPCS | Performed by: NURSE PRACTITIONER

## 2020-06-15 PROCEDURE — 99215 OFFICE O/P EST HI 40 MIN: CPT | Performed by: NURSE PRACTITIONER

## 2020-06-15 RX ORDER — FAMOTIDINE 20 MG/1
20 TABLET, FILM COATED ORAL 2 TIMES DAILY PRN
Qty: 60 TABLET | Refills: 5 | Status: SHIPPED | OUTPATIENT
Start: 2020-06-15 | End: 2021-01-28

## 2020-06-15 ASSESSMENT — ENCOUNTER SYMPTOMS
CONSTIPATION: 0
BACK PAIN: 1
ABDOMINAL PAIN: 1
EYES NEGATIVE: 1
VOMITING: 0
DIARRHEA: 0
ABDOMINAL DISTENTION: 0
BLOOD IN STOOL: 0
COUGH: 0
NAUSEA: 1
RESPIRATORY NEGATIVE: 1
BELCHING: 0
SHORTNESS OF BREATH: 0
FLATUS: 0

## 2020-06-15 NOTE — PROGRESS NOTES
problem has been waxing and waning. The pain is located in the epigastric region and LUQ. Associated symptoms include anorexia, arthralgias (bilateral knee pain. ) and nausea. Pertinent negatives include no belching, constipation, diarrhea, fever, flatus, headaches, hematuria or vomiting. The pain is aggravated by deep breathing. Relieved by: CBD gummies  He has tried nothing for the symptoms. The treatment provided no relief. His past medical history is significant for abdominal surgery and GERD. Vitals:    06/15/20 1548   BP: 120/80   Pulse: 97   Temp: 98.1 °F (36.7 °C)   SpO2: 99%   Weight: 283 lb (128.4 kg)   Height: 6' 3\" (1.905 m)       Wt Readings from Last 3 Encounters:   06/15/20 283 lb (128.4 kg)   01/07/20 271 lb (122.9 kg)   11/27/19 263 lb 14.3 oz (119.7 kg)     BP Readings from Last 3 Encounters:   06/15/20 120/80   01/07/20 130/70   11/27/19 123/89     REVIEW OF SYSTEMS    Review of Systems   Constitutional: Negative. Negative for chills and fever. Eyes: Negative. Negative for visual disturbance. Respiratory: Negative. Negative for cough and shortness of breath. Cardiovascular: Negative. Negative for chest pain and palpitations. Gastrointestinal: Positive for abdominal pain, anorexia and nausea. Negative for abdominal distention, blood in stool, constipation, diarrhea, flatus and vomiting. Genitourinary: Negative. Negative for hematuria. Musculoskeletal: Positive for arthralgias (bilateral knee pain. ) and back pain. Skin: Negative. Negative for rash. Neurological: Positive for dizziness (Chronic. Stable. Cardiac work-up WNL ), tremors (BUE and BLE after numbness goes away ), weakness and numbness. Negative for headaches. Psychiatric/Behavioral: Positive for sleep disturbance.      PAST MEDICAL HISTORY    Past Medical History:   Diagnosis Date    Chronic back pain 2012    Undescended right testes 2011     FAMILY HISTORY    Family History   Problem Relation Age of Onset  High Blood Pressure Mother     Heart Disease Mother     Heart Attack Mother 40    Cancer Father         unknown kind     Heart Attack Maternal Grandmother 61        COD     Other Sister         POTS     Heart Attack Maternal Grandfather 48    Prostate Cancer Maternal Grandfather     Stroke Maternal Grandfather         2000    No Known Problems Paternal Grandmother     No Known Problems Paternal Grandfather        SOCIAL HISTORY    Social History     Socioeconomic History    Marital status: Single     Spouse name: None    Number of children: None    Years of education: None    Highest education level: None   Occupational History    None   Social Needs    Financial resource strain: None    Food insecurity     Worry: None     Inability: None    Transportation needs     Medical: None     Non-medical: None   Tobacco Use    Smoking status: Former Smoker     Packs/day: 1.00     Years: 6.00     Pack years: 6.00     Types: Cigarettes     Last attempt to quit: 2018     Years since quittin.0    Smokeless tobacco: Never Used   Substance and Sexual Activity    Alcohol use: Yes     Comment: Occasionally     Drug use: No    Sexual activity: Yes     Partners: Female   Lifestyle    Physical activity     Days per week: None     Minutes per session: None    Stress: None   Relationships    Social connections     Talks on phone: None     Gets together: None     Attends Sikhism service: None     Active member of club or organization: None     Attends meetings of clubs or organizations: None     Relationship status: None    Intimate partner violence     Fear of current or ex partner: None     Emotionally abused: None     Physically abused: None     Forced sexual activity: None   Other Topics Concern    None   Social History Narrative    None       SURGICAL HISTORY    Past Surgical History:   Procedure Laterality Date    TESTICLE SURGERY Right     undecended testicle operation     is left CVA tenderness. There is no guarding or rebound. Comments: Worst pain in bilateral lower quadrants and LUQ    Skin:     General: Skin is warm and dry. Neurological:      Mental Status: He is alert and oriented to person, place, and time. Psychiatric:         Behavior: Behavior normal. Behavior is cooperative. ASSESSMENT/PLAN  1. Left sided abdominal pain  2. Anorexia  3. Nausea  4. Left flank pain    - Comprehensive Metabolic Panel; Future  - Lipase; Future  - Amylase; Future  - POCT Urinalysis no Micro  - Christina Nelson MD, Gastroenterology, [address]  - Lipase; Future  - Amylase; Future  -Reviewed CT scan done previously. -Get ordered testing, work on staying well hydrated, try to eat smaller more frequent bland meals.  -Will get US of spleen given strange presentation of sx.   -Referred back to GI with notes written on forms to call patient's phone number as [phone] area code as he never received a call after prior referral was made. 5. Other microscopic hematuria    Results for orders placed or performed in visit on 06/15/20   POCT Urinalysis no Micro   Result Value Ref Range    Color, UA      Clarity, UA      Glucose, UA POC negative     Bilirubin, UA negative     Ketones, UA negative     Spec Grav, UA 1.020     Blood, UA POC trace-intact     pH, UA 6.5     Protein, UA POC negative     Urobilinogen, UA 0.2     Leukocytes, UA negative     Nitrite, UA negative     Appearance, Fluid     -Previous UA in office over 1 year ago had small amount of blood in urine. Will have patient recheck when he gets the ordered labs done.   -Patient to be referred to urology if hematuria continues. - Urinalysis Reflex to Culture; Future    6. Gastroesophageal reflux disease, esophagitis presence not specified    -Chronic, stable, continue current medications. .  - famotidine (PEPCID) 20 MG tablet; Take 1 tablet by mouth 2 times daily as needed (acid reflux)  Dispense: 60 tablet;  Refill:

## 2020-06-25 ENCOUNTER — HOSPITAL ENCOUNTER (OUTPATIENT)
Dept: ULTRASOUND IMAGING | Age: 25
Discharge: HOME | End: 2020-06-27
Payer: MEDICAID

## 2020-06-25 PROCEDURE — 76705 ECHO EXAM OF ABDOMEN: CPT

## 2020-07-16 ENCOUNTER — TELEPHONE (OUTPATIENT)
Dept: GASTROENTEROLOGY | Age: 25
End: 2020-07-16

## 2020-09-03 ENCOUNTER — PATIENT MESSAGE (OUTPATIENT)
Dept: FAMILY MEDICINE CLINIC | Age: 25
End: 2020-09-03

## 2020-09-06 ENCOUNTER — PATIENT MESSAGE (OUTPATIENT)
Dept: FAMILY MEDICINE CLINIC | Age: 25
End: 2020-09-06

## 2020-09-08 NOTE — TELEPHONE ENCOUNTER
From: Daniela Najera  To: ANDREA Hernandez CNP  Sent: 9/6/2020 12:58 AM EDT  Subject: Visit Follow-Up Question    As of now I'm in so much pain I struggle to do daily activities my lung hurts when I breathe just normally I hardly eat I still have very inconsistent stool vomiting almost every morning I start coughing a will vomit saliva and that is all     I never got the labs done as I got busy with work   Also I never heard from GI I called once but was put on hold for 20+ mins and had to go back to work     I'm to the point where I'm willing to try and do anything to feel better I'm so miserable right now     Thanks!       ----- Message -----   ANDREA Mccullough CNP   Sent:9/5/2020 11:59 PM EDT   To:Tre Campuzano   Subject:RE: Visit Follow-Up Question    Syed Trevino     I reviewed all of the ER paperwork and results. They indicated that your pain was more in the left mid-low back area. Is it both the abdomen and back like it was before? When I saw you in June your pain was in the left upper quadrant of your abdomen. You were also complaining of intermittent nausea, lack of appetite and inconsistency in your stool quality in color. Is all of this still true? Any new symptoms that I should be aware of? I ordered some lab work in June that I do not have results on. Did you have this done somewhere else? Did you ever contact GI? They indicated that they called you in twice, but it looks like they didn't reach out until 1 month after our appointment. I'd like to hear a little bit more about what has been going on lately and hear your answers to the above questions. Preliminarily I think we should refer you to a different GI office. On a completley different note, you have had persistent blood in your urine that needs to be investigated and I would suggest you see urology for this. Let me know your thoughts.     Take Care,  Syd Pleitez       ----- Message -----   From:Tre Campuzano   Sent:9/3/2020

## 2020-09-09 RX ORDER — OMEPRAZOLE 40 MG/1
40 CAPSULE, DELAYED RELEASE ORAL
Qty: 30 CAPSULE | Refills: 5 | Status: SHIPPED | OUTPATIENT
Start: 2020-09-09 | End: 2021-10-19 | Stop reason: SDUPTHER

## 2020-09-09 NOTE — TELEPHONE ENCOUNTER
Health Maintenance   Topic Date Due    HIV screen  04/16/2008    DTaP/Tdap/Td vaccine (1 - Tdap) 04/16/2012    Flu vaccine (1) 09/01/2020    Varicella vaccine (1 of 2 - 2-dose childhood series) 07/25/2035 (Originally 4/16/1994)    Hepatitis A vaccine  Aged Out    Hepatitis B vaccine  Aged Out    Hib vaccine  Aged Out    Meningococcal (ACWY) vaccine  Aged Out    Pneumococcal 0-64 years Vaccine  Aged Out             (applicable per patient's age: Cancer Screenings, Depression Screening, Fall Risk Screening, Immunizations)    AST (U/L)   Date Value   05/30/2019 17     ALT (U/L)   Date Value   05/30/2019 19     BUN (mg/dL)   Date Value   05/30/2019 17      (goal A1C is < 7)   (goal LDL is <100) need 30-50% reduction from baseline     BP Readings from Last 3 Encounters:   06/15/20 120/80   01/07/20 130/70   11/27/19 123/89    (goal /80)      All Future Testing planned in [facility]:  Lab Frequency Next Occurrence   Semen analysis Once 03/24/2020   CBC Auto Differential Once 09/07/2020   Magnesium Once 09/07/2020   Comprehensive Metabolic Panel Once 56/98/3338   TSH without Reflex Once 09/07/2020   T4, Free Once 09/07/2020   Vitamin B12 Once 09/07/2020   Lipase Once 09/06/2020   Amylase Once 09/06/2020   Iron Once 09/06/2020   Ferritin Once 09/06/2020   HIV Screen Once 09/06/2020   Urinalysis Reflex to Culture Once 09/06/2020       Next Visit Date:  Future Appointments   Date Time Provider Rose Wolf   10/2/2020 10:30 AM ANDREA Olvera - FRIDA vargas fp [facility]            Patient Active Problem List:     Chronic neck pain     Chronic bilateral low back pain with bilateral sciatica     Chronic thoracic spine pain

## 2020-09-22 ENCOUNTER — PATIENT MESSAGE (OUTPATIENT)
Dept: FAMILY MEDICINE CLINIC | Age: 25
End: 2020-09-22

## 2020-09-22 NOTE — TELEPHONE ENCOUNTER
From: Nicole Bond  To: Vann Lesches, APRN - CNP  Sent: 9/22/2020 1:41 PM EDT  Subject: Test Results Question    Brittaney maxwell       Just had a colonoscopy today and when I was done he said I was showing signs of irritable bowel syndrome and told me to reach out to you and see what you would want to prescribe for it     Thanks

## 2020-09-25 ENCOUNTER — HOSPITAL ENCOUNTER (OUTPATIENT)
Dept: CT IMAGING | Age: 25
Discharge: HOME | End: 2020-09-27
Payer: MEDICAID

## 2020-09-25 PROCEDURE — 6360000004 HC RX CONTRAST MEDICATION: Performed by: NURSE PRACTITIONER

## 2020-09-25 PROCEDURE — 2580000003 HC RX 258: Performed by: NURSE PRACTITIONER

## 2020-09-25 PROCEDURE — 71260 CT THORAX DX C+: CPT

## 2020-09-25 RX ORDER — SODIUM CHLORIDE 0.9 % (FLUSH) 0.9 %
10 SYRINGE (ML) INJECTION ONCE
Status: COMPLETED | OUTPATIENT
Start: 2020-09-25 | End: 2020-09-25

## 2020-09-25 RX ORDER — 0.9 % SODIUM CHLORIDE 0.9 %
80 INTRAVENOUS SOLUTION INTRAVENOUS ONCE
Status: COMPLETED | OUTPATIENT
Start: 2020-09-25 | End: 2020-09-25

## 2020-09-25 RX ADMIN — SODIUM CHLORIDE 80 ML: 9 INJECTION, SOLUTION INTRAVENOUS at 08:26

## 2020-09-25 RX ADMIN — IOPAMIDOL 75 ML: 755 INJECTION, SOLUTION INTRAVENOUS at 08:26

## 2020-09-25 RX ADMIN — Medication 10 ML: at 08:26

## 2020-09-30 ENCOUNTER — OFFICE VISIT (OUTPATIENT)
Dept: PULMONOLOGY | Age: 25
End: 2020-09-30
Payer: MEDICAID

## 2020-09-30 VITALS
RESPIRATION RATE: 18 BRPM | BODY MASS INDEX: 32.73 KG/M2 | WEIGHT: 268.8 LBS | SYSTOLIC BLOOD PRESSURE: 132 MMHG | DIASTOLIC BLOOD PRESSURE: 90 MMHG | HEART RATE: 86 BPM | TEMPERATURE: 97.8 F | OXYGEN SATURATION: 98 % | HEIGHT: 76 IN

## 2020-09-30 PROCEDURE — 99204 OFFICE O/P NEW MOD 45 MIN: CPT | Performed by: INTERNAL MEDICINE

## 2020-09-30 PROCEDURE — 1036F TOBACCO NON-USER: CPT | Performed by: INTERNAL MEDICINE

## 2020-09-30 PROCEDURE — G8427 DOCREV CUR MEDS BY ELIG CLIN: HCPCS | Performed by: INTERNAL MEDICINE

## 2020-09-30 PROCEDURE — G8417 CALC BMI ABV UP PARAM F/U: HCPCS | Performed by: INTERNAL MEDICINE

## 2020-09-30 RX ORDER — ALBUTEROL SULFATE 90 UG/1
AEROSOL, METERED RESPIRATORY (INHALATION)
COMMUNITY
Start: 2020-09-08 | End: 2022-10-19 | Stop reason: ALTCHOICE

## 2020-09-30 ASSESSMENT — SLEEP AND FATIGUE QUESTIONNAIRES
HOW LIKELY ARE YOU TO NOD OFF OR FALL ASLEEP IN A CAR, WHILE STOPPED FOR A FEW MINUTES IN TRAFFIC: 0
HOW LIKELY ARE YOU TO NOD OFF OR FALL ASLEEP WHILE SITTING AND TALKING TO SOMEONE: 0
HOW LIKELY ARE YOU TO NOD OFF OR FALL ASLEEP WHEN YOU ARE A PASSENGER IN A CAR FOR AN HOUR WITHOUT A BREAK: 0
ESS TOTAL SCORE: 0
HOW LIKELY ARE YOU TO NOD OFF OR FALL ASLEEP WHILE LYING DOWN TO REST IN THE AFTERNOON WHEN CIRCUMSTANCES PERMIT: 0
HOW LIKELY ARE YOU TO NOD OFF OR FALL ASLEEP WHILE WATCHING TV: 0
HOW LIKELY ARE YOU TO NOD OFF OR FALL ASLEEP WHILE SITTING INACTIVE IN A PUBLIC PLACE: 0
HOW LIKELY ARE YOU TO NOD OFF OR FALL ASLEEP WHILE SITTING AND READING: 0
HOW LIKELY ARE YOU TO NOD OFF OR FALL ASLEEP WHILE SITTING QUIETLY AFTER LUNCH WITHOUT ALCOHOL: 0

## 2020-09-30 NOTE — PATIENT INSTRUCTIONS
Ct is scheduled, Bronch is scheduled at 1:00 [facility] Patient was given information sheet and knows to be there at 11:00am. Will

## 2020-09-30 NOTE — PROGRESS NOTES
OUTPATIENT PULMONARY CONSULT NOTE      Patient:  Felicia Pantoja  MRN: [id number]    Consulting Physician: ANDREA Diallo CNP  Reason for Consult: Lung nodule/hemoptysis  Primacy Care Physician: ANDREA Diallo CNP    HISTORY OF PRESENT ILLNESS:   The patient is a 30 y.o. male   He has history of abdominal pain, left upper quadrant/left lower subcostal pain. He was complaining of increased pain for last 3 to 4 weeks and he was seen in [facility] when he had a CT scan of the abdomen done  3-4 weeks of L sided flank pain, subcostal pain on 09/01/2020 which was nondiagnostic. According to patient he had a colonoscopy done few weeks ago was seen by gastroenterology and was told that he has diverticulosis and irritable bowel syndrome. On 09/28/2020 he also had a chest x-ray done which was normal.  When he was seen by his primary care physician a CT scan of the chest was ordered with contrast which shows 5 mm left lower lobe nodule which was also present on CT scan a year ago in 2019 without much change there was no other acute or chronic changes seen on the CT scan to explain chest pain or hemoptysis. For the last 3 to 4 weeks he is complaining of pain in his left upper quadrant/left subcostal area. He also complained of hemoptysis he claimed 5-6 times a week denies daily hemoptysis. He claimed that he has blood which he cough. He denies cough otherwise other than he has hemoptysis. He denies any sputum production denies purulent sputum. He denies pleuritic chest pain. Chest pain which is nonpleuritic is off and on and almost daily. He also complained of inconsistent breathing and complaint of shortness of breath on exertional activity sometimes at rest no specific aggravating or exacerbating factor. He denies any fever night sweats weight loss and appetite is good. He also complained of some time noting blood/bleeding from his nose when he wake up in the morning.   When he was seen in [facility] on 09/01/2020 he was given a prescription of albuterol which he use it as needed although he does not have any history of asthma or COPD. He does have history of severe reflux symptoms and he is both on Pepcid and PPI. He also claimed that sometime he has dark stool but denies bleeding per rectum or hematemesis. He works as a   He had history of smoking for about 10 years and he stopped smoking 10 months ago. Past Medical History:        Diagnosis Date    Chronic back pain 2012    Undescended right testes 2011       Past Surgical History:        Procedure Laterality Date    TESTICLE SURGERY Right 2011    undecended testicle operation     TYMPANOSTOMY TUBE PLACEMENT  1995       Allergies: Allergies   Allergen Reactions    Rondec Hives and Nausea And Vomiting         Home Meds:   Outpatient Encounter Medications as of 9/30/2020   Medication Sig Dispense Refill    albuterol sulfate  (90 Base) MCG/ACT inhaler inhale 2 puffs by mouth and INTO THE LUNGS every 4 hours if needed for wheezing      omeprazole (PRILOSEC) 40 MG delayed release capsule Take 1 capsule by mouth every morning (before breakfast) 30 capsule 5    famotidine (PEPCID) 20 MG tablet Take 1 tablet by mouth 2 times daily as needed (acid reflux) 60 tablet 5    Handicap Placard MISC by Does not apply route Expires 5 years from original written date 1 each 0    ondansetron (ZOFRAN) 4 MG tablet Take 1 tablet by mouth daily as needed for Nausea or Vomiting 30 tablet 5    traZODone (DESYREL) 50 MG tablet Take 1 tablet by mouth nightly 30 tablet 5     No facility-administered encounter medications on file as of 9/30/2020. Social History:   TOBACCO:   reports that he quit smoking about 2 years ago. His smoking use included cigarettes. He has a 6.00 pack-year smoking history. He has never used smokeless tobacco.  ETOH:   reports current alcohol use.   OCCUPATION:  [employer]    Family History: Problem Relation Age of Onset    High Blood Pressure Mother     Heart Disease Mother     Heart Attack Mother 40    Cancer Father         unknown kind     Heart Attack Maternal Grandmother 61        COD     Other Sister         POTS     Heart Attack Maternal Grandfather 48    Prostate Cancer Maternal Grandfather     Stroke Maternal Grandfather         2000    No Known Problems Paternal Grandmother     No Known Problems Paternal Grandfather        Immunizations: There is no immunization history on file for this patient.       REVIEW OF SYSTEMS:  CONSTITUTIONAL:  negative for  fevers, chills, sweats, fatigue, anorexia, and weight loss  EYES:  negative for  double vision, blurred vision, dry eyes, eye discharge, visual disturbance, redness, and icterus  HEENT:  Nose bleeding at night, negative for  hearing loss, tinnitus, ear drainage, earaches, nasal congestion, epistaxis, sore throat, hoarseness, voice change, and postnasal drip  RESPIRATORY:  Positive for hemoptysis and chest pain, dyspnea, negative for  dry cough, cough with sputum, dyspnea, wheezing,  and pleuritic pain  CARDIOVASCULAR: Positive for  chest pain and  dyspnea, negative palpitations, orthopnea, PND, exertional chest pressure/discomfort, fatigue, edema, syncope  GASTROINTESTINAL: Positive for abdominal pain, occasional dark stool, negative for nausea, vomiting, diarrhea, constipation, abdominal mass, abdominal distention, jaundice, dysphagia, reflux, odynophagia, hematemesis, and hemtochezia, occasional black stools  GENITOURINARY:  Positive for occasional burningnegative for frequency, dysuria, nocturia, and hematuria  HEMATOLOGIC/LYMPHATIC:  negative for easy bruising, bleeding, lymphadenopathy, and petechiae  ALLERGIC/IMMUNOLOGIC:  negative for recurrent infections, urticaria, hay fever, angioedema, anaphylaxis, and drug reactions  ENDOCRINE:  negative for heat intolerance, cold intolerance, tremor, and weight changes  MUSCULOSKELETAL:  negative for  myalgias, arthralgias, joint swelling, stiff joints, and muscle weakness  NEUROLOGICAL:  negative for headaches, dizziness, seizures, memory problems, speech problems, visual disturbance, gait problems, tremor, dysphagia, weakness, numbness, syncope, and tingling  BEHAVIOR/PSYCH:  negative for decreased sleep, decreased energy level, increased energy level, poor concentration, depressed mood, and anxiety        Physical Exam:    Vitals: BP (!) 132/90 (Site: Right Upper Arm, Position: Sitting, Cuff Size: Large Adult)   Pulse 86   Temp 97.8 °F (36.6 °C) (Temporal)   Resp 18   Ht 6' 4\" (1.93 m)   Wt 268 lb 12.8 oz (121.9 kg)   SpO2 98% Comment: room air at rest  BMI 32.72 kg/m²   Last 3 weights: Wt Readings from Last 3 Encounters:   09/30/20 268 lb 12.8 oz (121.9 kg)   06/15/20 283 lb (128.4 kg)   01/07/20 271 lb (122.9 kg)     Body mass index is 32.72 kg/m². Physical Examination:   General appearance - alert, well appearing, and in no distress, overweight and acyanotic, in no respiratory distress  Mental status - alert, oriented to person, place, and time  Eyes - pupils equal and reactive, extraocular eye movements intact, sclera anicteric  Ears - right ear normal, left ear normal  Nose - normal and patent, no erythema, discharge or polyps  Mouth - mucous membranes moist, pharynx normal without lesions  Neck - supple, no significant adenopathy, thick neck  Chest - no tachypnea, retractions or cyanosis bilateral symmetrical chest movement, normal resonance on percussion, air entry is present bilaterally and symmetrical, no expiratory wheezing rhonchi or crackles.   Heart - normal rate, regular rhythm, normal S1, S2, no murmurs, rubs, clicks or gallops  Abdomen - soft, nontender, nondistended, no masses or organomegaly  Neurological - alert, oriented, normal speech, no focal findings or movement disorder noted}  Extremities - peripheral pulses normal, no pedal edema, no clubbing or cyanosis  Skin - normal coloration and turgor, no rashes, no suspicious skin lesions noted       LABS:    CBC:   WBC   Date Value Ref Range Status   05/30/2019 7.1 3.5 - 11.3 k/uL Final   04/22/2019 9.1 3.5 - 11.3 k/uL Final   09/19/2013 14.9 (H) 4.5 - 13.5 k/uL Final     Hemoglobin   Date Value Ref Range Status   05/30/2019 15.0 13.0 - 17.0 g/dL Final   04/22/2019 14.1 13.0 - 17.0 g/dL Final   09/19/2013 13.6 13.5 - 17.5 g/dL Final     Platelets   Date Value Ref Range Status   05/30/2019 328 138 - 453 k/uL Final   04/22/2019 270 138 - 453 k/uL Final   09/19/2013 293 130 - 400 k/uL Final     BMP:   Sodium   Date Value Ref Range Status   05/30/2019 140 135 - 144 mmol/L Final   04/22/2019 136 135 - 144 mmol/L Final   09/19/2013 142 136 - 145 mmol/L Final     Potassium   Date Value Ref Range Status   05/30/2019 4.5 3.7 - 5.3 mmol/L Final   04/22/2019 3.8 3.7 - 5.3 mmol/L Final   09/19/2013 3.6 3.5 - 5.1 mmol/L Final     Chloride   Date Value Ref Range Status   05/30/2019 101 98 - 107 mmol/L Final   04/22/2019 103 98 - 107 mmol/L Final   09/19/2013 107 98 - 110 mmol/L Final     CO2   Date Value Ref Range Status   05/30/2019 25 20 - 31 mmol/L Final   04/22/2019 20 20 - 31 mmol/L Final   09/19/2013 28 20 - 31 mmol/L Final     BUN   Date Value Ref Range Status   05/30/2019 17 6 - 20 mg/dL Final   04/22/2019 18 6 - 20 mg/dL Final   09/19/2013 21 (H) 5 - 20 mg/dL Final     CREATININE   Date Value Ref Range Status   05/30/2019 0.85 0.70 - 1.20 mg/dL Final   04/22/2019 0.88 0.70 - 1.20 mg/dL Final   09/19/2013 1.01 0.6 - 1.4 mg/dL Final     Glucose   Date Value Ref Range Status   05/30/2019 105 (H) 70 - 99 mg/dL Final   04/22/2019 92 70 - 99 mg/dL Final   09/19/2013 93 74 - 106 mg/dL Final     Hepatic:   AST   Date Value Ref Range Status   05/30/2019 17 <40 U/L Final   09/19/2013 17 8 - 36 U/L Final     ALT   Date Value Ref Range Status   05/30/2019 19 5 - 41 U/L Final   09/19/2013 17 4 - 40 U/L Final     Total Bilirubin   Date Value Ref Range Status   05/30/2019 0.29 (L) 0.3 - 1.2 mg/dL Final   09/19/2013 0.30 0.30 - 1.20 mg/dL Final     Alkaline Phosphatase   Date Value Ref Range Status   05/30/2019 56 40 - 129 U/L Final   09/19/2013 54 25 - 100 U/L Final     Amylase:   Amylase   Date Value Ref Range Status   05/30/2019 54 28 - 100 U/L Final     Lipase:   Lipase   Date Value Ref Range Status   05/30/2019 23 13 - 60 U/L Final     CARDIAC ENZYMES: No results found for: CKTOTAL, CKMB, CKMBINDEX, TROPONINI  BNP: No results found for: BNP  Lipids: No results found for: CHOL, HDL    INR: No results found for: INR  Thyroid: No results found for: TSH  Urinalysis:   Bacteria, UA   Date Value Ref Range Status   09/19/2013 FEW (A) NONE Final     Blood, UA POC   Date Value Ref Range Status   06/15/2020 trace-intact  Final     Clarity, UA   Date Value Ref Range Status   04/23/2019 clear  Final     Color, UA   Date Value Ref Range Status   04/23/2019 yellow  Final   09/19/2013 YELLOW YEL Final     pH, UA   Date Value Ref Range Status   06/15/2020 6.5  Final   09/19/2013 6.0 5.0 - 8.0 Final     Protein, UA   Date Value Ref Range Status   09/19/2013 TRACE (A) NEG Final     Protein, UA POC   Date Value Ref Range Status   06/15/2020 negative  Final     RBC, UA   Date Value Ref Range Status   09/19/2013 None 0 - 2 /HPF Final     Specific Gravity, UA   Date Value Ref Range Status   09/19/2013 1.020 1.005 - 1.030 Final     Spec Grav, UA   Date Value Ref Range Status   06/15/2020 1.020  Final     Bilirubin, UA   Date Value Ref Range Status   06/15/2020 negative  Final     Nitrite, Urine   Date Value Ref Range Status   09/19/2013 NEGATIVE NEG Final     WBC, UA   Date Value Ref Range Status   09/19/2013 0 TO 2 0 - 5 /HPF Final     Leukocyte Esterase, Urine   Date Value Ref Range Status   09/19/2013 NEGATIVE NEG Final     Comment:     Performed at [facility] [address], [address] [phone]     Leukocytes, UA Date Value Ref Range Status   06/15/2020 negative  Final     Glucose, Ur   Date Value Ref Range Status   09/19/2013 NEGATIVE NEG Final     Glucose, UA POC   Date Value Ref Range Status   06/15/2020 negative  Final     Cultures:-  -----------------------------------------------------------------    ABGs: No results found for: PHART, PO2ART, EQQ4FZL    Pulmonary Functions Testing Results:    No results found for: FEV1, FVC, WCQ5PDB, TLC, DLCO    CXR  09/08/2020  Report from [facility]  The cardiomediastinal silhouette is within normal limits of size and configuration, stable. Lungs are clear without focal consolidation, sizable pleural effusion, or gross pneumothorax. CT Scans  CT chest 09/25/2020  Mediastinum: No evidence of mediastinal lymphadenopathy.  Normal heart size. Normal thoracic aorta.  Normal pulmonary trunk.         Lungs/pleura: The lungs are without acute process.  No focal consolidation or    pulmonary edema.  No evidence of pleural effusion or pneumothorax.  Stable    benign 5 mm left lower lobe nodule on series 4 image 84.  Posterior left    lower lobe calcified granuloma.  Juxtapleural benign micronodule lateral    right upper lobe on series 4, image 69.  Few benign tiny subpleural nodular    areas posterior right lower lobe. Assessment and Plan       ICD-10-CM    1. Hemoptysis  R04.2    2. Chest pain, unspecified type  R07.9        Patient Active Problem List   Diagnosis    Chronic neck pain    Chronic bilateral low back pain with bilateral sciatica    Chronic thoracic spine pain       Assessment:    He is complaining of hemoptysis for 3 to 4 weeks also associated with left lower subcostal/left upper quadrant pain. He had a d-dimer done on 09/08/2020 which was negative. CBC was also done which was normal along with normal platelet count and normal chemistry on 09/08/2020, he also had a COVID test done on 09/08/2020 which was not detected.   He had no cough except hemoptysis

## 2020-10-01 ENCOUNTER — PATIENT MESSAGE (OUTPATIENT)
Dept: FAMILY MEDICINE CLINIC | Age: 25
End: 2020-10-01

## 2020-10-01 NOTE — TELEPHONE ENCOUNTER
From: Chivo Maldonado  To: ANDREA Mancera CNP  Sent: 10/1/2020 3:15 PM EDT  Subject: Test Results Question    I think that sounds like a good plan I also think taking a fiber sup will be easier for me to stay on top of do I just buy that at the store like in the vitamins section ? As for the lungs I don't really have any questions just chris rolling with the punches I just want to feel better thanks hans       ----- Message -----   ANDREA Box CNP   Sent:10/1/2020 12:44 AM EDT   To:Tre Hudson   Subject:RE: Test Results Question    Donzell Ying and/ or stool softeners are often a good place to start. It's important to make sure that you are drinking plenty of water, eating fibrous foods in your diet and trying to exercise daily, but I do realize that might be difficult for you. Fiber can be taken in the form of a supplement if you prefer, but the fibrous fruits and veggies in your diet is also helpful. The goal is to have soft formed stools daily. I can send in Miralax for you if you would like to start that. Typically we start with taking Miralax daily, but we can increase it up to four times daily until you have the soft formed stools daily. The people then we can slowly reduce the MiraLAX to once daily as long as you keep going. Let me know your thoughts.      Take Care,  Rekha Sevilla       ----- Message -----   From:Tre Hudson   Sent:9/25/2020 4:31 PM EDT   To:ANDREA Overton CNP   Subject:RE: Test Results Question    I've never tried anything really I usually just push thru it       ----- Message -----   ANDREA Box CNP   Sent:9/25/2020 4:30 PM EDT   To:Tre Hudson   Subject:RE: Test Results Question    What are you taking now or have you tried in the past? It is good to know what has worked and has not worked in the past      ----- Message -----   [address] [place] 12:06 AM EDT   To:ANDREA Ray - FRIDA   Subject:RE: Test Results Question    More on the constipation side of things and hard stool I have a hard time going       ----- Message -----   ANDREA Wellington CNP   Sent:9/25/2020 12:04 AM EDT   To:Tre Sevilla   Subject:RE: Test Results Question    [place],     I am surprised the GI specialist did not have any thing they wanted to prescribe, but that is fine. Do you have constipation, diarrhea or a combination of both? There are lots of ways to treat IBS, but it does depend on what particular form you have. When I saw you in June I do not believe you had constipation or diarrhea. Let me know.     Take Care,  Bi Stover       ----- Message -----   From:Tre Sevilla   Sent:9/22/2020 1:41 PM EDT   To:ANDREA Diaz CNP   Subject:Test Results Question    Brittaney maxwell       Just had a colonoscopy today and when I was done he said I was showing signs of irritable bowel syndrome and told me to reach out to you and see what you would want to prescribe for it     Thanks

## 2020-10-02 ENCOUNTER — HOSPITAL ENCOUNTER (OUTPATIENT)
Dept: CT IMAGING | Age: 25
Discharge: HOME | End: 2020-10-04
Payer: MEDICAID

## 2020-10-02 ENCOUNTER — TELEPHONE (OUTPATIENT)
Dept: PULMONOLOGY | Age: 25
End: 2020-10-02

## 2020-10-02 PROCEDURE — 71260 CT THORAX DX C+: CPT

## 2020-10-02 PROCEDURE — 6360000004 HC RX CONTRAST MEDICATION: Performed by: INTERNAL MEDICINE

## 2020-10-02 RX ADMIN — IOPAMIDOL 75 ML: 755 INJECTION, SOLUTION INTRAVENOUS at 09:08

## 2020-10-05 ENCOUNTER — HOSPITAL ENCOUNTER (OUTPATIENT)
Dept: PREADMISSION TESTING | Age: 25
Setting detail: SPECIMEN
Discharge: HOME | End: 2020-10-09
Payer: MEDICAID

## 2020-10-06 ENCOUNTER — TELEPHONE (OUTPATIENT)
Dept: PULMONOLOGY | Age: 25
End: 2020-10-06

## 2020-10-06 NOTE — TELEPHONE ENCOUNTER
Patient called with questions on Ct not being covered and his scheduled Bronch. I spoke to him and he said that it was strange when he went to [phone] For the scheduled Covid testing they stated he did not have a scheduled appt for testing. Needless to say he did not get done. I was concerned due to the appt being scheduled and clearly showing up in Past appts. But still showing \"arrived\"  I called Scheduling for assistance and spoke to Concard. She was amazing. .. she called Suzanne Abrams and explained the situation and urgency for covid test. Someone will call patient tomorrow and go to his house to do a rapid swab and test so he can get his Bronch done. I asked patient to call me ASAP after he gets swab.

## 2020-10-07 PROCEDURE — U0003 INFECTIOUS AGENT DETECTION BY NUCLEIC ACID (DNA OR RNA); SEVERE ACUTE RESPIRATORY SYNDROME CORONAVIRUS 2 (SARS-COV-2) (CORONAVIRUS DISEASE [COVID-19]), AMPLIFIED PROBE TECHNIQUE, MAKING USE OF HIGH THROUGHPUT TECHNOLOGIES AS DESCRIBED BY CMS-2020-01-R: HCPCS

## 2020-10-08 ENCOUNTER — ANESTHESIA EVENT (OUTPATIENT)
Dept: ENDOSCOPY | Age: 25
End: 2020-10-08
Payer: MEDICAID

## 2020-10-08 ENCOUNTER — ANESTHESIA (OUTPATIENT)
Dept: ENDOSCOPY | Age: 25
End: 2020-10-08
Payer: MEDICAID

## 2020-10-08 ENCOUNTER — HOSPITAL ENCOUNTER (OUTPATIENT)
Age: 25
Setting detail: OUTPATIENT SURGERY
Discharge: HOME | End: 2020-10-08
Attending: INTERNAL MEDICINE | Admitting: INTERNAL MEDICINE
Payer: MEDICAID

## 2020-10-08 VITALS
DIASTOLIC BLOOD PRESSURE: 123 MMHG | RESPIRATION RATE: 17 BRPM | OXYGEN SATURATION: 95 % | SYSTOLIC BLOOD PRESSURE: 145 MMHG

## 2020-10-08 VITALS
DIASTOLIC BLOOD PRESSURE: 100 MMHG | OXYGEN SATURATION: 99 % | HEART RATE: 88 BPM | RESPIRATION RATE: 14 BRPM | HEIGHT: 75 IN | WEIGHT: 270 LBS | SYSTOLIC BLOOD PRESSURE: 149 MMHG | TEMPERATURE: 97.2 F | BODY MASS INDEX: 33.57 KG/M2

## 2020-10-08 PROCEDURE — 2500000003 HC RX 250 WO HCPCS: Performed by: NURSE ANESTHETIST, CERTIFIED REGISTERED

## 2020-10-08 PROCEDURE — 31622 DX BRONCHOSCOPE/WASH: CPT | Performed by: INTERNAL MEDICINE

## 2020-10-08 PROCEDURE — 2500000003 HC RX 250 WO HCPCS: Performed by: INTERNAL MEDICINE

## 2020-10-08 PROCEDURE — 7100000011 HC PHASE II RECOVERY - ADDTL 15 MIN: Performed by: INTERNAL MEDICINE

## 2020-10-08 PROCEDURE — 3700000001 HC ADD 15 MINUTES (ANESTHESIA): Performed by: INTERNAL MEDICINE

## 2020-10-08 PROCEDURE — 3700000000 HC ANESTHESIA ATTENDED CARE: Performed by: INTERNAL MEDICINE

## 2020-10-08 PROCEDURE — 2709999900 HC NON-CHARGEABLE SUPPLY: Performed by: INTERNAL MEDICINE

## 2020-10-08 PROCEDURE — 3609027000 HC BRONCHOSCOPY: Performed by: INTERNAL MEDICINE

## 2020-10-08 PROCEDURE — 6360000002 HC RX W HCPCS: Performed by: NURSE ANESTHETIST, CERTIFIED REGISTERED

## 2020-10-08 PROCEDURE — 6370000000 HC RX 637 (ALT 250 FOR IP): Performed by: INTERNAL MEDICINE

## 2020-10-08 PROCEDURE — 2580000003 HC RX 258: Performed by: INTERNAL MEDICINE

## 2020-10-08 PROCEDURE — 7100000010 HC PHASE II RECOVERY - FIRST 15 MIN: Performed by: INTERNAL MEDICINE

## 2020-10-08 RX ORDER — FENTANYL CITRATE 50 UG/ML
25 INJECTION, SOLUTION INTRAMUSCULAR; INTRAVENOUS ONCE
Status: CANCELLED | OUTPATIENT
Start: 2020-10-08 | End: 2020-10-08

## 2020-10-08 RX ORDER — GLYCOPYRROLATE 1 MG/5 ML
SYRINGE (ML) INTRAVENOUS PRN
Status: DISCONTINUED | OUTPATIENT
Start: 2020-10-08 | End: 2020-10-08 | Stop reason: SDUPTHER

## 2020-10-08 RX ORDER — MIDAZOLAM HYDROCHLORIDE 1 MG/ML
INJECTION INTRAMUSCULAR; INTRAVENOUS PRN
Status: DISCONTINUED | OUTPATIENT
Start: 2020-10-08 | End: 2020-10-08 | Stop reason: SDUPTHER

## 2020-10-08 RX ORDER — LIDOCAINE HYDROCHLORIDE 40 MG/ML
4 INJECTION, SOLUTION RETROBULBAR; TOPICAL ONCE
Status: COMPLETED | OUTPATIENT
Start: 2020-10-08 | End: 2020-10-08

## 2020-10-08 RX ORDER — SODIUM CHLORIDE 0.9 % (FLUSH) 0.9 %
10 SYRINGE (ML) INJECTION PRN
Status: CANCELLED | OUTPATIENT
Start: 2020-10-08

## 2020-10-08 RX ORDER — LIDOCAINE HYDROCHLORIDE 20 MG/ML
SOLUTION OROPHARYNGEAL PRN
Status: DISCONTINUED | OUTPATIENT
Start: 2020-10-08 | End: 2020-10-08 | Stop reason: ALTCHOICE

## 2020-10-08 RX ORDER — SODIUM CHLORIDE 0.9 % (FLUSH) 0.9 %
10 SYRINGE (ML) INJECTION EVERY 12 HOURS SCHEDULED
Status: CANCELLED | OUTPATIENT
Start: 2020-10-08

## 2020-10-08 RX ORDER — PROPOFOL 10 MG/ML
INJECTION, EMULSION INTRAVENOUS PRN
Status: DISCONTINUED | OUTPATIENT
Start: 2020-10-08 | End: 2020-10-08 | Stop reason: SDUPTHER

## 2020-10-08 RX ORDER — ONDANSETRON 2 MG/ML
4 INJECTION INTRAMUSCULAR; INTRAVENOUS
Status: DISCONTINUED | OUTPATIENT
Start: 2020-10-08 | End: 2020-10-08 | Stop reason: HOSPADM

## 2020-10-08 RX ORDER — SODIUM CHLORIDE 9 MG/ML
INJECTION, SOLUTION INTRAVENOUS CONTINUOUS
Status: DISCONTINUED | OUTPATIENT
Start: 2020-10-08 | End: 2020-10-08 | Stop reason: HOSPADM

## 2020-10-08 RX ORDER — LIDOCAINE HYDROCHLORIDE 10 MG/ML
INJECTION, SOLUTION EPIDURAL; INFILTRATION; INTRACAUDAL; PERINEURAL PRN
Status: DISCONTINUED | OUTPATIENT
Start: 2020-10-08 | End: 2020-10-08 | Stop reason: ALTCHOICE

## 2020-10-08 RX ORDER — ONDANSETRON 2 MG/ML
4 INJECTION INTRAMUSCULAR; INTRAVENOUS ONCE
Status: CANCELLED | OUTPATIENT
Start: 2020-10-08 | End: 2020-10-08

## 2020-10-08 RX ORDER — MIDAZOLAM HYDROCHLORIDE 1 MG/ML
2 INJECTION INTRAMUSCULAR; INTRAVENOUS
Status: CANCELLED | OUTPATIENT
Start: 2020-10-08 | End: 2020-10-08

## 2020-10-08 RX ORDER — SODIUM CHLORIDE, SODIUM LACTATE, POTASSIUM CHLORIDE, CALCIUM CHLORIDE 600; 310; 30; 20 MG/100ML; MG/100ML; MG/100ML; MG/100ML
INJECTION, SOLUTION INTRAVENOUS CONTINUOUS
Status: CANCELLED | OUTPATIENT
Start: 2020-10-08

## 2020-10-08 RX ORDER — FENTANYL CITRATE 50 UG/ML
50 INJECTION, SOLUTION INTRAMUSCULAR; INTRAVENOUS EVERY 5 MIN PRN
Status: DISCONTINUED | OUTPATIENT
Start: 2020-10-08 | End: 2020-10-08 | Stop reason: HOSPADM

## 2020-10-08 RX ADMIN — MIDAZOLAM HYDROCHLORIDE 2 MG: 1 INJECTION, SOLUTION INTRAMUSCULAR; INTRAVENOUS at 13:09

## 2020-10-08 RX ADMIN — Medication 0.1 MG: at 13:10

## 2020-10-08 RX ADMIN — PROPOFOL 200 MG: 10 INJECTION, EMULSION INTRAVENOUS at 13:16

## 2020-10-08 RX ADMIN — LIDOCAINE HYDROCHLORIDE 4 ML: 40 INJECTION, SOLUTION RETROBULBAR; TOPICAL at 12:34

## 2020-10-08 RX ADMIN — PROPOFOL 20 MG: 10 INJECTION, EMULSION INTRAVENOUS at 13:26

## 2020-10-08 RX ADMIN — PROPOFOL 80 MG: 10 INJECTION, EMULSION INTRAVENOUS at 13:13

## 2020-10-08 RX ADMIN — PROPOFOL 120 MG: 10 INJECTION, EMULSION INTRAVENOUS at 13:14

## 2020-10-08 RX ADMIN — SODIUM CHLORIDE: 9 INJECTION, SOLUTION INTRAVENOUS at 11:54

## 2020-10-08 ASSESSMENT — PAIN SCALES - GENERAL
PAINLEVEL_OUTOF10: 7
PAINLEVEL_OUTOF10: 0
PAINLEVEL_OUTOF10: 5

## 2020-10-08 ASSESSMENT — PAIN DESCRIPTION - ONSET
ONSET: ON-GOING

## 2020-10-08 ASSESSMENT — PAIN DESCRIPTION - DESCRIPTORS
DESCRIPTORS: THROBBING
DESCRIPTORS: SHARP

## 2020-10-08 ASSESSMENT — PAIN DESCRIPTION - PAIN TYPE
TYPE: ACUTE PAIN

## 2020-10-08 ASSESSMENT — PAIN DESCRIPTION - ORIENTATION
ORIENTATION: RIGHT

## 2020-10-08 ASSESSMENT — PAIN - FUNCTIONAL ASSESSMENT: PAIN_FUNCTIONAL_ASSESSMENT: 0-10

## 2020-10-08 ASSESSMENT — PAIN DESCRIPTION - FREQUENCY
FREQUENCY: CONTINUOUS

## 2020-10-08 ASSESSMENT — PAIN DESCRIPTION - LOCATION
LOCATION: NOSE
LOCATION: NOSE

## 2020-10-08 NOTE — ANESTHESIA POSTPROCEDURE EVALUATION
Department of Anesthesiology  Postprocedure Note    Patient: Heike Martinez  MRN: [id number]  YOB: 1995  Date of evaluation: 10/8/2020  Time:  2:05 PM     Procedure Summary     Date:  10/08/20 Room / Location:  [address]    Anesthesia Start:  0184 Anesthesia Stop:  4189    Procedure:  BRONCHOSCOPY (N/A ) Diagnosis:  (HEMOPTYSIS)    Surgeon:  Ana Quiroz MD Responsible Provider:  Jacinto Barillas MD    Anesthesia Type:  MAC ASA Status:  2          Anesthesia Type: MAC    Rylan Phase I: Rylan Score: 10    Rylan Phase II: Rylan Score: 10    Last vitals: Reviewed and per EMR flowsheets.        Anesthesia Post Evaluation    Patient location during evaluation: PACU  Patient participation: complete - patient participated  Level of consciousness: awake  Pain score: 3  Airway patency: patent  Nausea & Vomiting: no vomiting and no nausea  Complications: no  Cardiovascular status: blood pressure returned to baseline  Respiratory status: acceptable  Hydration status: euvolemic

## 2020-10-08 NOTE — ANESTHESIA PRE PROCEDURE
Department of Anesthesiology  Preprocedure Note       Name:  Marly Peters   Age:  30 y.o.  :  1995                                          MRN:  [id number]         Date:  10/8/2020      Surgeon: Stephanie Phillpis):  Bisi Meyers MD    Procedure: Procedure(s):  BRONCHOSCOPY    Medications prior to admission:   Prior to Admission medications    Medication Sig Start Date End Date Taking? Authorizing Provider   albuterol sulfate  (90 Base) MCG/ACT inhaler inhale 2 puffs by mouth and INTO THE LUNGS every 4 hours if needed for wheezing 20  Yes Historical Provider, MD   famotidine (PEPCID) 20 MG tablet Take 1 tablet by mouth 2 times daily as needed (acid reflux) 6/15/20  Yes ANDREA Lew CNP   ondansetron (ZOFRAN) 4 MG tablet Take 1 tablet by mouth daily as needed for Nausea or Vomiting 19  Yes ANDREA Lew CNP   omeprazole (PRILOSEC) 40 MG delayed release capsule Take 1 capsule by mouth every morning (before breakfast) 20   ANDREA Lew CNP   traZODone (DESYREL) 50 MG tablet Take 1 tablet by mouth nightly 20  ANDREA Lew CNP   Handicap Placard MISC by Does not apply route Expires 5 years from original written date 10/30/19   ANDREA Lew CNP       Current medications:    Current Facility-Administered Medications   Medication Dose Route Frequency Provider Last Rate Last Dose    0.9 % sodium chloride infusion   Intravenous Continuous Romulo Del Real MD 30 mL/hr at 10/08/20 1154      fentaNYL (SUBLIMAZE) injection 50 mcg  50 mcg Intravenous Q5 Min PRN Kaitlyn Mir MD        ondansetron [facility] injection 4 mg  4 mg Intravenous Once PRN Kaitlyn Mir MD           Allergies:     Allergies   Allergen Reactions    Rondec Hives and Nausea And Vomiting       Problem List:    Patient Active Problem List   Diagnosis Code    Chronic neck pain M54.2, G89.29    Chronic bilateral low back pain with bilateral sciatica M54.42, M54.41, G89.29    Chronic thoracic spine pain M54.6, G89.29       Past Medical History:        Diagnosis Date    Chronic back pain     Diverticulosis     GERD (gastroesophageal reflux disease)     Hemoptysis     History of intussusception     OR at [facility] Lung nodule     LLL nodule    Undescended right testes        Past Surgical History:        Procedure Laterality Date    COLON SURGERY      intussusception    COLONOSCOPY      TESTICLE SURGERY Right     undecended testicle operation     TYMPANOSTOMY TUBE PLACEMENT         Social History:    Social History     Tobacco Use    Smoking status: Former Smoker     Packs/day: 1.00     Years: 6.00     Pack years: 6.00     Types: Cigarettes     Last attempt to quit: 2018     Years since quittin.3    Smokeless tobacco: Never Used   Substance Use Topics    Alcohol use: Yes     Comment: Occasionally, very rare                                Counseling given: Not Answered      Vital Signs (Current):   Vitals:    10/08/20 1145   BP: (!) 138/90   Pulse: 91   Resp: 21   Temp: 99.3 °F (37.4 °C)   TempSrc: Infrared   SpO2: 99%   Weight: 270 lb (122.5 kg)   Height: 6' 3\" (1.905 m)                                              BP Readings from Last 3 Encounters:   10/08/20 (!) 138/90   20 (!) 132/90   06/15/20 120/80       NPO Status: Time of last liquid consumption: 0300                        Time of last solid consumption: 0000                        Date of last liquid consumption: 10/08/20                        Date of last solid food consumption: 10/08/20    BMI:   Wt Readings from Last 3 Encounters:   10/08/20 270 lb (122.5 kg)   20 268 lb 12.8 oz (121.9 kg)   06/15/20 283 lb (128.4 kg)     Body mass index is 33.75 kg/m².     CBC:   Lab Results   Component Value Date    WBC 7.1 2019    RBC 4.97 2019    HGB 15.0 2019    HCT 46.2 2019    MCV 93.0 2019    RDW 13.0 2019     2019       CMP: Lab Results   Component Value Date     05/30/2019    K 4.5 05/30/2019     05/30/2019    CO2 25 05/30/2019    BUN 17 05/30/2019    CREATININE 0.85 05/30/2019    GFRAA >60 05/30/2019    LABGLOM >60 05/30/2019    GLUCOSE 105 05/30/2019    PROT 7.4 05/30/2019    CALCIUM 9.9 05/30/2019    BILITOT 0.29 05/30/2019    ALKPHOS 56 05/30/2019    AST 17 05/30/2019    ALT 19 05/30/2019       POC Tests: No results for input(s): POCGLU, POCNA, POCK, POCCL, POCBUN, POCHEMO, POCHCT in the last 72 hours. Coags: No results found for: PROTIME, INR, APTT    HCG (If Applicable): No results found for: PREGTESTUR, PREGSERUM, HCG, HCGQUANT     ABGs: No results found for: PHART, PO2ART, OFY6CYM, FLQ9TNM, BEART, I9RBIGBQ     Type & Screen (If Applicable):  No results found for: LABABO, LABRH    Drug/Infectious Status (If Applicable):  No results found for: HIV, HEPCAB    COVID-19 Screening (If Applicable): No results found for: COVID19      Anesthesia Evaluation  Patient summary reviewed and Nursing notes reviewed no history of anesthetic complications:   Airway: Mallampati: II  TM distance: >3 FB   Neck ROM: full  Mouth opening: > = 3 FB Dental: normal exam         Pulmonary:Negative Pulmonary ROS breath sounds clear to auscultation                             Cardiovascular:  Exercise tolerance: good (>4 METS),       (-) valvular problems/murmurs, past MI and CAD      Rhythm: regular  Rate: normal                    Neuro/Psych:   Negative Neuro/Psych ROS  (+) neuromuscular disease:,             GI/Hepatic/Renal: Neg GI/Hepatic/Renal ROS  (+) GERD:,           Endo/Other: Negative Endo/Other ROS                    Abdominal:       Abdomen: soft. Vascular: negative vascular ROS. Anesthesia Plan      MAC     ASA 2       Induction: intravenous. MIPS: Postoperative opioids intended and Prophylactic antiemetics administered. Anesthetic plan and risks discussed with patient.       Plan

## 2020-10-08 NOTE — PROCEDURES
Heike Martinez is a 30 y.o. male patient. With a history of hemoptysis. Indication for bronchoscopy hemoptysis. No diagnosis found. Past Medical History:   Diagnosis Date    Chronic back pain 2012    Diverticulosis     GERD (gastroesophageal reflux disease)     Hemoptysis 2020    History of intussusception     OR at [facility] Lung nodule 2020    LLL nodule    Undescended right testes 2011     Blood pressure (!) 158/98, pulse 87, temperature 97.2 °F (36.2 °C), temperature source Infrared, resp. rate 21, height 6' 3\" (1.905 m), weight 270 lb (122.5 kg), SpO2 98 %. Procedures  Bronchoscopy Procedure Note    Date of Operation: 10/8/2020    Pre-op Diagnosis: Hemoptysis    Post-op Diagnosis:   No bleeding from airway on either side. No mass. Normal-looking tracheal and bronchial mucosa  No source of bleeding or hemoptysis seen from tracheobronchial tree. Surgeon: Ana Quiroz    Anesthesia: Per CRNA    Operation: Flexible fiberoptic bronchoscopy, diagnostic     Findings:   Normal tracheobronchial mucosa. No bleeding or vascular abnormality seen  No source of hemoptysis recognized on bronchoscopy. Specimen:   None    Estimated Blood Loss: None    Complications: None    Indications and History:  The patient is a 30 y.o. male with history of hemoptysis. The risks, benefits, complications, treatment options and expected outcomes were discussed with the patient. The possibilities of reaction to medication, pulmonary aspiration, perforation of a viscus, bleeding, failure to diagnose a condition and creating a complication requiring transfusion or operation were discussed with the patient who freely signed the consent. Description of Procedure: The patient was seen in the Holding Room   The patient was taken to the endoscopy suite, identified as Heike Martinez and the procedure verified as Flexible Fiberoptic Bronchoscopy. A Time Out was held and the above information confirmed. After the induction of topical nasopharyngeal anesthesia, the patient was positioned  and the bronchoscope was passed through the right nostril. The vocal cords were visualized, and  2% lidocaine was topically placed onto the cords. The cords were mobile. The scope was then passed into the trachea. Additional 2% lidocaine was used topically within the airway. Careful inspection of the tracheal lumen was accomplished. The scope was sequentially passed into the left main and then left upper and lower bronchi and segmental bronchi. The scope was then withdrawn and advanced into the right main bronchus and then into the RUL, RML, and RLL bronchi and segmental bronchi. Endobronchial findings: No endobronchial mucosal abnormality mass or bleeding. Trachea: Normal mucosa  Nelly: Normal mucosa  Right main bronchus: Normal mucosa  Right upper lobe bronchus: Normal mucosa  Right upper lobe bronchus: Normal mucosa  Right upper lobe bronchus: Normal mucosa  Left main bronchus: Normal mucosa  Left upper lobe bronchus: Normal mucosa  Left lower lobe bronchus: Normal mucosa    The Patient was taken to the Endoscopy Recovery area in satisfactory condition.         Cara Hinton MD  10/8/2020

## 2020-10-08 NOTE — H&P
History and Physical    Pt Name: Billy Sutton  MRN: [id number]  YOB: 1995  Date of evaluation: 10/8/2020    SUBJECTIVE:   History of Chief Complaint:    Patient presents for bronchoscopy. He has no history of asthma or COPD in the past.  He has GERD, history of intussusception in the past as well. He was initially experiencing LUQ pain, sought evaluation. He was also experiencing cough with hemoptysis. He had a CT scan, then colonoscopy (diverticular disease), CT chest which showed a LLL nodule. He has been scheduled for bronchoscopy today. Past Medical History    has a past medical history of Chronic back pain, Diverticulosis, GERD (gastroesophageal reflux disease), Hemoptysis, History of intussusception, Lung nodule, and Undescended right testes. Past Surgical History   has a past surgical history that includes Testicle surgery (Right, 2011); Tympanostomy tube placement (1995); Colonoscopy; and Colon surgery. Medications  Prior to Admission medications    Medication Sig Start Date End Date Taking? Authorizing Provider   albuterol sulfate  (90 Base) MCG/ACT inhaler inhale 2 puffs by mouth and INTO THE LUNGS every 4 hours if needed for wheezing 9/8/20  Yes Historical Provider, MD   famotidine (PEPCID) 20 MG tablet Take 1 tablet by mouth 2 times daily as needed (acid reflux) 6/15/20  Yes ANDREA Caldera CNP   ondansetron (ZOFRAN) 4 MG tablet Take 1 tablet by mouth daily as needed for Nausea or Vomiting 5/28/19  Yes ANDREA Caldera CNP   omeprazole (PRILOSEC) 40 MG delayed release capsule Take 1 capsule by mouth every morning (before breakfast) 9/9/20   ANDREA Caldera CNP   traZODone (DESYREL) 50 MG tablet Take 1 tablet by mouth nightly 1/7/20 2/6/20  ANDREA Caldera CNP   Handicap Placard MISC by Does not apply route Expires 5 years from original written date 10/30/19   ANDREA Caldera CNP     Allergies  is allergic to rondec.   Family History  family signed 10/8/2020 at 12:06 PM

## 2020-10-09 ENCOUNTER — TELEPHONE (OUTPATIENT)
Dept: PULMONOLOGY | Age: 25
End: 2020-10-09

## 2020-10-09 NOTE — TELEPHONE ENCOUNTER
In order for the CT to be covered I had to call and then send paperwork to the appeals dept at [facility]. It was mailed on 10-9-20. Waiting for response.

## 2020-10-10 LAB — SARS-COV-2, NAA: NOT DETECTED

## 2020-10-16 ENCOUNTER — OFFICE VISIT (OUTPATIENT)
Dept: PULMONOLOGY | Age: 25
End: 2020-10-16
Payer: MEDICAID

## 2020-10-16 VITALS
DIASTOLIC BLOOD PRESSURE: 80 MMHG | OXYGEN SATURATION: 97 % | HEIGHT: 75 IN | RESPIRATION RATE: 20 BRPM | BODY MASS INDEX: 33.74 KG/M2 | HEART RATE: 73 BPM | SYSTOLIC BLOOD PRESSURE: 112 MMHG | WEIGHT: 271.4 LBS | TEMPERATURE: 98.4 F

## 2020-10-16 PROCEDURE — 99214 OFFICE O/P EST MOD 30 MIN: CPT | Performed by: INTERNAL MEDICINE

## 2020-10-16 PROCEDURE — G8417 CALC BMI ABV UP PARAM F/U: HCPCS | Performed by: INTERNAL MEDICINE

## 2020-10-16 PROCEDURE — 1036F TOBACCO NON-USER: CPT | Performed by: INTERNAL MEDICINE

## 2020-10-16 PROCEDURE — G8484 FLU IMMUNIZE NO ADMIN: HCPCS | Performed by: INTERNAL MEDICINE

## 2020-10-16 PROCEDURE — G8427 DOCREV CUR MEDS BY ELIG CLIN: HCPCS | Performed by: INTERNAL MEDICINE

## 2020-10-16 ASSESSMENT — SLEEP AND FATIGUE QUESTIONNAIRES
HOW LIKELY ARE YOU TO NOD OFF OR FALL ASLEEP WHEN YOU ARE A PASSENGER IN A CAR FOR AN HOUR WITHOUT A BREAK: 0
HOW LIKELY ARE YOU TO NOD OFF OR FALL ASLEEP WHILE WATCHING TV: 0
ESS TOTAL SCORE: 0
HOW LIKELY ARE YOU TO NOD OFF OR FALL ASLEEP WHILE SITTING AND READING: 0
HOW LIKELY ARE YOU TO NOD OFF OR FALL ASLEEP WHILE SITTING AND TALKING TO SOMEONE: 0
HOW LIKELY ARE YOU TO NOD OFF OR FALL ASLEEP IN A CAR, WHILE STOPPED FOR A FEW MINUTES IN TRAFFIC: 0
HOW LIKELY ARE YOU TO NOD OFF OR FALL ASLEEP WHILE SITTING INACTIVE IN A PUBLIC PLACE: 0
HOW LIKELY ARE YOU TO NOD OFF OR FALL ASLEEP WHILE LYING DOWN TO REST IN THE AFTERNOON WHEN CIRCUMSTANCES PERMIT: 0
NECK CIRCUMFERENCE (INCHES): 0
HOW LIKELY ARE YOU TO NOD OFF OR FALL ASLEEP WHILE SITTING QUIETLY AFTER LUNCH WITHOUT ALCOHOL: 0

## 2020-10-16 NOTE — PROGRESS NOTES
OUTPATIENT PULMONARY CONSULT NOTE      Patient:  Leanne Talbot  MRN: [id number]    Consulting Physician: ANDREA Alonso CNP  Reason for Consult: Lung nodule/hemoptysis  Primacy Care Physician: ANDREA Alonso CNP    HISTORY OF PRESENT ILLNESS:   The patient is a 30 y.o. male follow-up of hemoptysis. He was seen in the office for the first time in 09/30/2020 when he was referred here for hemoptysis. He was also complaining of left-sided subcostal/flank pain. He had a CT scan of the chest which was negative for any acute abnormality. A CTA chest was done because of the chest pain, CTA scan of the chest does not show any evidence of pulmonary embolism. No parenchymal lung pathology was seen on the CT chest calcified granuloma 5 mm left lower lobe which was present in previous CT scan of the abdomen about a year ago. He was scheduled to have a bronchoscopy which was done on 10/08/2020. Bronchoscopy did not show any mucosal abnormality or mass or endobronchial abnormality to account for subjective hemoptysis. According to patient his hemoptysis stopped a day or 2 before bronchoscopy and had not had any hemoptysis since then. He has occasional blood-tinged nasal secretion he denies significant postnasal drip except he has occasional nasal congestion which is mostly seasonal.  He said his left subcostal/flank pain is better but he still have the pain and he is going to follow-up with primary care physician. He denies cough, wheezing, sputum production. Denies fever hemoptysis pleuritic chest pain. Denies weight loss weight gain or loss of appetite. He denies shortness of breath, dyspnea on exertion, orthopnea and PND     When he had bronchoscopy done he had severe episodes of sleep apnea/airway obstruction during sleep with severe hypoxia with airway obstruction and require manipulation of his neck by anesthesia and had slightly prolonged recovery from sedation post bronchoscopy.   Because of witnessed apnea and hypoxia with conscious sedation and severely snoring with airway collapsibility during procedure sleep apnea was considered be  Patient does have history of snoring, he has multiple awakening at night he toss and turn at night. He usually go to sleep late he claims that he goes to bed around 11 PM but he does not goes to sleep around 3-4 in the morning and he wakes up around 9:30 in the morning. He does not usually doze off or take nap during the daytime when he wake up in the morning he usually feels slightly tired and then after during the daytime he does not feel tired. Sleep questionnaire 10/16/2020  Snores at night, wakes himself snoring. Has witnessed apnea. Does not wake up with choking and gasping sensation. Positive dry mouth upon awakening. Fatigue and tiredness during the day. No history of sleep apnea. Goes to sleep at 11 pm does not sleep until 3-4 am, wakes up 9 30 am. It takes hours to fall asleep. Does not wakes up times at night to go to bathroom. Takes no nap during the day. No headache in am. No car wrecks or near wrecks because of the sleepiness. No nodding off while driving. No weight gain. No forgetfulness or decreased concentration. No nasal congestion or obstruction at night. No significant caffienated drinks or alcohol. No leg jerks during sleep. Positive restless feelings in legs at night. No numbness or burning in leg or feet. No leg aches or cramps . No loss of muscle strength when angry or laugh. No hallucination when dozing off or waking up from sleep. No paralysis upon awakening from sleep or going to sleep. No teeth grinding, nightmares, sleep walking. No night time panic attacks.      Sleep Medicine 10/16/2020 9/30/2020   Sitting and reading 0 0   Watching TV 0 0   Sitting, inactive in a public place (e.g. a theatre or a meeting) 0 0   As a passenger in a car for an hour without a break 0 0   Lying down to rest in the afternoon when circumstances permit 0 0 Sitting and talking to someone 0 0   Sitting quietly after a lunch without alcohol 0 0   In a car, while stopped for a few minutes in traffic 0 0   Total score 0 0   Neck circumference 0 -       Initial history and evaluation on 09/30/2020    He has history of abdominal pain, left upper quadrant/left lower subcostal pain. He was complaining of increased pain for last 3 to 4 weeks and he was seen in [facility] [place] CT scan of the abdomen done  3-4 weeks of L sided flank pain, subcostal pain on 09/01/2020 which was nondiagnostic. According to patient he had a colonoscopy done few weeks ago was seen by gastroenterology and was told that he has diverticulosis and irritable bowel syndrome. On 09/28/2020 he also had a chest x-ray done which was normal.  When he was seen by his primary care physician a CT scan of the chest was ordered with contrast which shows 5 mm left lower lobe nodule which was also present on CT scan a year ago in 2019 without much change there was no other acute or chronic changes seen on the CT scan to explain chest pain or hemoptysis. For the last 3 to 4 weeks he is complaining of pain in his left upper quadrant/left subcostal area. He also complained of hemoptysis he claimed 5-6 times a week denies daily hemoptysis. He claimed that he has blood which he cough. He denies cough otherwise other than he has hemoptysis. He denies any sputum production denies purulent sputum. He denies pleuritic chest pain. Chest pain which is nonpleuritic is off and on and almost daily. He also complained of some time noting blood/bleeding from his nose when he wake up in the morning. When he was seen in [facility] [place] on 09/01/2020 he was given a prescription of albuterol which he use it as needed although he does not have any history of asthma or COPD. He does have history of severe reflux symptoms and he is both on Pepcid and PPI.   He works as a   He had history of smoking for about 10 years and he stopped smoking 10 months ago. Past Medical History:        Diagnosis Date    Chronic back pain 2012    Diverticulosis     GERD (gastroesophageal reflux disease)     Hemoptysis 2020    History of intussusception     [facility] at [facility] Lung nodule 2020    LLL nodule    Undescended right testes 2011       Past Surgical History:        Procedure Laterality Date    BRONCHOSCOPY N/A 10/8/2020    BRONCHOSCOPY performed by Bisi Meyers MD at [address]      intussusception    COLONOSCOPY      TESTICLE SURGERY Right 2011    undecended testicle operation     TYMPANOSTOMY TUBE PLACEMENT  1995       Allergies: Allergies   Allergen Reactions    Rondec Hives and Nausea And Vomiting         Home Meds:   Outpatient Encounter Medications as of 10/16/2020   Medication Sig Dispense Refill    albuterol sulfate  (90 Base) MCG/ACT inhaler inhale 2 puffs by mouth and INTO THE LUNGS every 4 hours if needed for wheezing      omeprazole (PRILOSEC) 40 MG delayed release capsule Take 1 capsule by mouth every morning (before breakfast) 30 capsule 5    famotidine (PEPCID) 20 MG tablet Take 1 tablet by mouth 2 times daily as needed (acid reflux) 60 tablet 5    Handicap Placard MISC by Does not apply route Expires 5 years from original written date 1 each 0    ondansetron (ZOFRAN) 4 MG tablet Take 1 tablet by mouth daily as needed for Nausea or Vomiting 30 tablet 5    traZODone (DESYREL) 50 MG tablet Take 1 tablet by mouth nightly 30 tablet 5     No facility-administered encounter medications on file as of 10/16/2020. Social History:   TOBACCO:   reports that he quit smoking about 2 years ago. His smoking use included cigarettes. He has a 6.00 pack-year smoking history. He has never used smokeless tobacco.  ETOH:   reports current alcohol use.   OCCUPATION:  [employer]    Family History:       Problem Relation Age of Onset    High Blood Pressure Mother    [facility] Heart Disease Mother     Heart Attack Mother 40    Cancer Father         unknown kind     Heart Attack Maternal Grandmother 61        COD     Other Sister         POTS     Heart Attack Maternal Grandfather 48    Prostate Cancer Maternal Grandfather     Stroke Maternal Grandfather         2000    No Known Problems Paternal Grandmother     No Known Problems Paternal Grandfather        Immunizations: There is no immunization history on file for this patient.       REVIEW OF SYSTEMS:  CONSTITUTIONAL:  negative for  fevers, chills, sweats, fatigue, anorexia, and weight loss  EYES:  negative for  double vision, blurred vision, dry eyes, eye discharge, visual disturbance, redness, and icterus  HEENT:  Nose bleeding at night, negative for  hearing loss, tinnitus, ear drainage, earaches, nasal congestion, epistaxis, sore throat, hoarseness, voice change, and postnasal drip  RESPIRATORY: Negative for hemoptysis and chest pain, dyspnea, negative for  dry cough, cough with sputum, dyspnea, wheezing,  and pleuritic pain  CARDIOVASCULAR: Negative for  chest pain and  dyspnea, negative palpitations, orthopnea, PND, exertional chest pressure/discomfort, fatigue, edema, syncope  GASTROINTESTINAL: Positive for abdominal pain, occasional dark stool, negative for nausea, vomiting, diarrhea, constipation, abdominal mass, abdominal distention, jaundice, dysphagia, reflux, odynophagia, hematemesis, and hemtochezia, occasional black stools  GENITOURINARY:  Positive for occasional burning, negative for frequency, dysuria, nocturia, and hematuria  HEMATOLOGIC/LYMPHATIC:  negative for easy bruising, bleeding, lymphadenopathy, and petechiae  ALLERGIC/IMMUNOLOGIC:  negative for recurrent infections, urticaria, hay fever, angioedema, anaphylaxis, and drug reactions  ENDOCRINE:  negative for heat intolerance, cold intolerance, tremor, and weight changes  MUSCULOSKELETAL:  negative for  myalgias, arthralgias, joint swelling, stiff joints, and muscle weakness  NEUROLOGICAL:  negative for headaches, dizziness, seizures, memory problems, speech problems, visual disturbance, gait problems, tremor, dysphagia, weakness, numbness, syncope, and tingling  BEHAVIOR/PSYCH:  negative for decreased sleep, decreased energy level, increased energy level, poor concentration, depressed mood, and anxiety        Physical Exam:    Vitals: /80 (Site: Left Upper Arm, Position: Sitting, Cuff Size: Large Adult)   Pulse 73   Temp 98.4 °F (36.9 °C) (Temporal)   Resp 20   Ht 6' 3\" (1.905 m)   Wt 271 lb 6.4 oz (123.1 kg)   SpO2 97% Comment: room air at rest  BMI 33.92 kg/m²   Last 3 weights: Wt Readings from Last 3 Encounters:   10/16/20 271 lb 6.4 oz (123.1 kg)   10/08/20 270 lb (122.5 kg)   09/30/20 268 lb 12.8 oz (121.9 kg)     Body mass index is 33.92 kg/m². Physical Examination:   General appearance - alert, well appearing, and in no distress, overweight and acyanotic, in no respiratory distress  Mental status - alert, oriented to person, place, and time  Eyes - pupils equal and reactive, extraocular eye movements intact, sclera anicteric  Ears - right ear normal, left ear normal  Nose - normal and patent, no erythema, discharge or polyps  Mouth - mucous membranes moist, pharynx normal without lesions  Neck - supple, no significant adenopathy, thick neck  Chest - no tachypnea, retractions or cyanosis bilateral symmetrical chest movement, normal resonance on percussion, air entry is present bilaterally and symmetrical, no expiratory wheezing rhonchi or crackles.   Heart - normal rate, regular rhythm, normal S1, S2, no murmurs, rubs, clicks or gallops  Abdomen - soft, nontender, nondistended, no masses or organomegaly  Neurological - alert, oriented, normal speech, no focal findings or movement disorder noted  Extremities - peripheral pulses normal, no pedal edema, no clubbing or cyanosis  Skin - normal coloration and turgor, no rashes, no suspicious skin lesions noted       LABS:    CBC:   WBC   Date Value Ref Range Status   05/30/2019 7.1 3.5 - 11.3 k/uL Final   04/22/2019 9.1 3.5 - 11.3 k/uL Final   09/19/2013 14.9 (H) 4.5 - 13.5 k/uL Final     Hemoglobin   Date Value Ref Range Status   05/30/2019 15.0 13.0 - 17.0 g/dL Final   04/22/2019 14.1 13.0 - 17.0 g/dL Final   09/19/2013 13.6 13.5 - 17.5 g/dL Final     Platelets   Date Value Ref Range Status   05/30/2019 328 138 - 453 k/uL Final   04/22/2019 270 138 - 453 k/uL Final   09/19/2013 293 130 - 400 k/uL Final     BMP:   Sodium   Date Value Ref Range Status   05/30/2019 140 135 - 144 mmol/L Final   04/22/2019 136 135 - 144 mmol/L Final   09/19/2013 142 136 - 145 mmol/L Final     Potassium   Date Value Ref Range Status   05/30/2019 4.5 3.7 - 5.3 mmol/L Final   04/22/2019 3.8 3.7 - 5.3 mmol/L Final   09/19/2013 3.6 3.5 - 5.1 mmol/L Final     Chloride   Date Value Ref Range Status   05/30/2019 101 98 - 107 mmol/L Final   04/22/2019 103 98 - 107 mmol/L Final   09/19/2013 107 98 - 110 mmol/L Final     CO2   Date Value Ref Range Status   05/30/2019 25 20 - 31 mmol/L Final   04/22/2019 20 20 - 31 mmol/L Final   09/19/2013 28 20 - 31 mmol/L Final     BUN   Date Value Ref Range Status   05/30/2019 17 6 - 20 mg/dL Final   04/22/2019 18 6 - 20 mg/dL Final   09/19/2013 21 (H) 5 - 20 mg/dL Final     CREATININE   Date Value Ref Range Status   05/30/2019 0.85 0.70 - 1.20 mg/dL Final   04/22/2019 0.88 0.70 - 1.20 mg/dL Final   09/19/2013 1.01 0.6 - 1.4 mg/dL Final     Glucose   Date Value Ref Range Status   05/30/2019 105 (H) 70 - 99 mg/dL Final   04/22/2019 92 70 - 99 mg/dL Final   09/19/2013 93 74 - 106 mg/dL Final     Hepatic:   AST   Date Value Ref Range Status   05/30/2019 17 <40 U/L Final   09/19/2013 17 8 - 36 U/L Final     ALT   Date Value Ref Range Status   05/30/2019 19 5 - 41 U/L Final   09/19/2013 17 4 - 40 U/L Final     Total Bilirubin   Date Value Ref Range Status   05/30/2019 0.29 (L) 0.3 - 1.2 mg/dL Final 09/19/2013 0.30 0.30 - 1.20 mg/dL Final     Alkaline Phosphatase   Date Value Ref Range Status   05/30/2019 56 40 - 129 U/L Final   09/19/2013 54 25 - 100 U/L Final     Amylase:   Amylase   Date Value Ref Range Status   05/30/2019 54 28 - 100 U/L Final     Lipase:   Lipase   Date Value Ref Range Status   05/30/2019 23 13 - 60 U/L Final     CARDIAC ENZYMES: No results found for: CKTOTAL, CKMB, CKMBINDEX, TROPONINI  BNP: No results found for: BNP  Lipids: No results found for: CHOL, HDL    INR: No results found for: INR  Thyroid: No results found for: TSH  Urinalysis:     Cultures:-  -----------------------------------------------------------------    ABGs: No results found for: PHART, PO2ART, NPF2SEX    Pulmonary Functions Testing Results:    No results found for: FEV1, FVC, QAA8PKB, TLC, DLCO    CXR  09/08/2020  Report from [facility]  The cardiomediastinal silhouette is within normal limits of size and configuration, stable. Lungs are clear without focal consolidation, sizable pleural effusion, or gross pneumothorax. CT Scans  CT chest 09/25/2020  Mediastinum: No evidence of mediastinal lymphadenopathy.  Normal heart size. Normal thoracic aorta.  Normal pulmonary trunk.         Lungs/pleura: The lungs are without acute process.  No focal consolidation or    pulmonary edema.  No evidence of pleural effusion or pneumothorax.  Stable    benign 5 mm left lower lobe nodule on series 4 image 84.  Posterior left    lower lobe calcified granuloma.  Juxtapleural benign micronodule lateral    right upper lobe on series 4, image 69.  Few benign tiny subpleural nodular    areas posterior right lower lobe. CT chest PE protocol 10/02/2020  1.  No evidence of pulmonary embolism or acute pulmonary abnormality.         2.  Sequela of old granulomatous disease in the chest with stable    noncalcified 5 mm nodule in the left lower lobe over 1 year, consistent with    a benign process requiring no further evaluation. Assessment and Plan       ICD-10-CM    1. Hemoptysis  R04.2    2. AROLDO (obstructive sleep apnea)  G47.33        Patient Active Problem List   Diagnosis    Chronic neck pain    Chronic bilateral low back pain with bilateral sciatica    Chronic thoracic spine pain       Assessment:    He denies any more hemoptysis. His bronchoscopy is negative for any source of bleeding or any endobronchial lesion or mucosal abnormality no bleeding was seen on bronchoscopy. CTA chest was negative for pulmonary embolism. No parenchymal abnormity seen on the CT chest.    He noted to have severe apneas and hypoxia during sedation and easy airway collapsibility during bronchoscopy and recommended sleep study. Discussed with him and he agrees for getting a sleep study    Plan and recommendation:      If he has recurrent hemoptysis then would suggest ENT evaluation for nasal and upper airway exam  CRP order on last visit he did not do labs    Sleep study baseline. Sleep study for titration if baseline study shows sleep apnea. Regular sleep-wake cycle and sleep hygiene discussed with patient  Good sleep environment. Increase activity exercise and weight loss. Vaccinations recommended annually for flu  Up to date with vaccinations from pulm perspective   CXR reviewed   CT chest/CTA chest reviewed  Questions answered pertaining to diagnosis and management explained importance of compliance with therapy     Return to office in 2 months (follow-up for sleep study )    It was my pleasure to evaluate Joanie Bonds today. Please call with questions. Re Gordon MD             10/16/2020, 3:24 PM    Please note that this chart was generated using voice recognition Dragon dictation software. Although every effort was made to ensure the accuracy of this automated transcription, some errors in transcription may have occurred.

## 2020-11-02 ENCOUNTER — TELEPHONE (OUTPATIENT)
Dept: FAMILY MEDICINE CLINIC | Age: 25
End: 2020-11-02

## 2020-11-02 ENCOUNTER — OFFICE VISIT (OUTPATIENT)
Dept: FAMILY MEDICINE CLINIC | Age: 25
End: 2020-11-02
Payer: MEDICAID

## 2020-11-02 VITALS
DIASTOLIC BLOOD PRESSURE: 84 MMHG | BODY MASS INDEX: 33.52 KG/M2 | HEART RATE: 84 BPM | SYSTOLIC BLOOD PRESSURE: 123 MMHG | TEMPERATURE: 97.2 F | OXYGEN SATURATION: 94 % | HEIGHT: 75 IN | WEIGHT: 269.6 LBS

## 2020-11-02 PROCEDURE — G8417 CALC BMI ABV UP PARAM F/U: HCPCS | Performed by: NURSE PRACTITIONER

## 2020-11-02 PROCEDURE — G8484 FLU IMMUNIZE NO ADMIN: HCPCS | Performed by: NURSE PRACTITIONER

## 2020-11-02 PROCEDURE — G8427 DOCREV CUR MEDS BY ELIG CLIN: HCPCS | Performed by: NURSE PRACTITIONER

## 2020-11-02 PROCEDURE — 1036F TOBACCO NON-USER: CPT | Performed by: NURSE PRACTITIONER

## 2020-11-02 PROCEDURE — 99214 OFFICE O/P EST MOD 30 MIN: CPT | Performed by: NURSE PRACTITIONER

## 2020-11-02 RX ORDER — SERTRALINE HYDROCHLORIDE 25 MG/1
TABLET, FILM COATED ORAL
Qty: 60 TABLET | Refills: 1 | Status: SHIPPED | OUTPATIENT
Start: 2020-11-02 | End: 2021-09-07

## 2020-11-02 RX ORDER — EPINEPHRINE 0.3 MG/.3ML
INJECTION SUBCUTANEOUS
Qty: 2 EACH | Refills: 2 | Status: SHIPPED | OUTPATIENT
Start: 2020-11-02 | End: 2022-10-19 | Stop reason: ALTCHOICE

## 2020-11-02 ASSESSMENT — ENCOUNTER SYMPTOMS
COUGH: 0
NAUSEA: 1
FLATUS: 0
CONSTIPATION: 0
ABDOMINAL PAIN: 1
DIARRHEA: 0
VOMITING: 0
APNEA: 1
BELCHING: 0

## 2020-11-02 NOTE — PATIENT INSTRUCTIONS
Patient Education        Irritable Bowel Syndrome: Care Instructions  Your Care Instructions  Irritable bowel syndrome, or IBS, is a problem with the intestines that causes belly pain, bloating, gas, constipation, and diarrhea. The cause of IBS is not well known. IBS can last for many years, but it does not get worse over time or lead to serious disease. Most people can control their symptoms by changing their diet and reducing stress. Follow-up care is a key part of your treatment and safety. Be sure to make and go to all appointments, and call your doctor if you are having problems. It's also a good idea to know your test results and keep a list of the medicines you take. How can you care for yourself at home? · Prevent diarrhea:  ? Limit the amount of high-fiber foods you eat. This includes vegetables, fruits, whole-grain breads and pasta, high-fiber cereal, and brown rice. ? Limit dairy products. ? Limit artificial sweeteners such as sorbitol and xylitol. · Avoid constipation:  ? Include fruits, vegetables, beans, and whole grains in your diet each day. These foods are high in fiber. ? Drink plenty of fluids. If you have kidney, heart, or liver disease and have to limit fluids, talk with your doctor before you increase the amount of fluids you drink. ? Get some exercise every day. Build up slowly to 30 to 60 minutes a day on 5 or more days of the week. ? Take a fiber supplement, such as Citrucel or Metamucil, every day if needed. Read and follow all instructions on the label. ? Schedule time each day for a bowel movement. Having a daily routine may help. Take your time and do not strain when having a bowel movement. · To help relieve bloating or gas, avoid foods such as beans, cabbage, cauliflower, or broccoli. · Keep a daily diary of what you eat and what symptoms you have. This may help find foods that cause you problems. · Eat slowly. Try to make mealtime relaxing.   · Find ways to reduce stress. When should you call for help? Call your doctor now or seek immediate medical care if:    · Your pain is different than usual or occurs with fever.     · You lose weight without trying, or you lose your appetite and you do not know why.     · Your symptoms often wake you from sleep.     · Your stools are black and tarlike or have streaks of blood. Watch closely for changes in your health, and be sure to contact your doctor if:    · Your IBS symptoms get worse or begin to disrupt your day-to-day life.     · You become more tired than usual.     · Your home treatment stops working. Where can you learn more? Go to [email or web address]://TuizzipeVigoeb.Serveron. org and sign in to your [employer] account. Enter H759 in the [employer] box to learn more about \"Irritable Bowel Syndrome: Care Instructions. \"     If you do not have an account, please click on the \"Sign Up Now\" link. Current as of: April 15, 2020               Content Version: 12.6  © 2006-2020 [employer]. Care instructions adapted under license by [facility] ([facility]). If you have questions about a medical condition or this instruction, always ask your healthcare professional. [place] [address] any warranty or liability for your use of this information. Patient Education        Learning About the Low FODMAP Diet for Irritable Bowel Syndrome (IBS)  What is the low-FODMAP diet? A low-FODMAP diet is a way to find out what foods give you digestion problems. You stop eating certain high-FODMAP foods for about 2 months. Then you add them back to see how your body reacts. This is called a \"challenge diet. \" A dietitian or doctor can help you follow this diet. FODMAPs are carbohydrates. They are in many types of foods. FODMAP stands for:  · F ermentable. · O ligosaccharides. · D isaccharides. · M onosaccharides. · A nd p olyols. If you have digestive problems, some of these foods can make your symptoms worse. When you are on this diet, you can still eat certain fruits and vegetables. You can also eat certain grains, meats, fish, and lactose-free milks. What is it used for? If you have irritable bowel syndrome (IBS), you can ease your symptoms by not eating some types of foods. Some people also use this diet for inflammatory bowel disease (IBD) or some food intolerances. High-FODMAP foods can be hard to digest. They pull more fluid into your intestines. They are also easily fermented. This can lead to bloating, belly pain, gas, and diarrhea. The low-FODMAP diet can help you figure out what foods to avoid. And it can help you find foods that are easier to digest.  This diet can help with symptoms of some digestive diseases. But it's not a cure. You will still need to manage your condition. How does it work? You will work with a doctor or dietitian when you start the diet. At first, you won't eat any high-FODMAP foods for a few weeks. Go to www.eelusion to learn more about this diet. Leonorakoby Nicoleemmy also find links to an radu for your phone or other device. You'll find low-FODMAP cookbooks there too. After 6 to 8 weeks, you will start to try high-FODMAP foods again. You will add those foods back to your diet, one group at a time. Your doctor or dietitian will probably have you wait a few days before you add each new group of those foods. Keep a food diary. You can write down the foods you try and note how they make you feel. After a few weeks, you may have a better idea of what foods you should avoid and what foods make you feel your best.  What are the risks? There is some risk of not getting all of the vitamins and nutrients you need on the low-FODMAP diet. These include:  · Folate. · Thiamin. · Vitamin B6.  · Calcium. · Vitamin D. Your dietitian or doctor can help you find other sources of these if needed. This diet may limit your fiber intake.  Try to plan your meals to include other sources of pumpkin, rutabaga, seaweed, sprouts, Swiss chard, and spinach. You can eat scallions (green part only) and squash (not butternut). You can eat tomatoes, turnips, watercress, yams, and zucchini. You can also have small amounts of artichoke hearts (from can, 1 oz), carrots, corn (½ cob), and sweet potato (½ cup). Avoid: Artichokes, asparagus, Latta sprouts, sharif cabbage, cauliflower, and celery. And avoid garlic, leeks, mushrooms, okra, onions, scallions (white part), shallots, and peas. Fruits  Okay to eat: Bananas, blueberries, cantaloupe, coconut, grapes, and honeydew. Kiwi, steff, limes, oranges, passion fruit, papaya, and pineapple are also okay. You can eat plantain, raspberries, rhubarb, star fruit, strawberries, tangelo, and tangerine. You can also have small amounts of dried banana chips (up to 10 chips), dried cranberries (1 Tbsp), and shredded coconut (up to ¼ cup). Avoid: Apples, applesauce, apricots, avocados, blackberries, boysenberries, and cherries. Also avoid dates, figs, grapefruit, guava, lychee, and mangoes. Don't eat nectarines, peaches, pears, persimmon, plums, prunes, tamarillo, or watermelon. And limit most canned and dried fruits. Oils, spices, condiments, and sweeteners  Okay to eat: Vegetable oils (including garlic infused), butter, ghee, lard, and margarine (no trans fat). You can have most fresh herbs like basil, chives, coriander, ana, parsley, rosemary and thyme. You can have salt, jams made from low-FODMAP fruits, mayonnaise, and mustard. Soy sauce, hot sauce (no garlic), tamari, and vinegar are also okay. Sweeteners that are okay include sugar (sucrose), powdered (confectioner's) sugar, brown sugar, glucose, and maple syrup. You can also have some artificial sweeteners like aspartame, saccharine, and stevia. Avoid: Chutneys, hummus, jellies, garlic sauces, and gravies made with onion or garlic.  Avoid pickles, relish, some salad dressings and soup stocks, salsa, and tomato paste. And avoid sauces and other foods with high fructose corn syrup, honey, molasses, and agave. Avoid artificial sweeteners (isomalt, mannitol, malitol, sorbitol, and xylitol). Avoid corn syrup solids, fructose, fruit juice concentrate, and polydextrose. Other foods and drinks  Okay to have: Water, soda water, tonic, soft drinks sweetened with sugar, ½ cup of low-FODMAP fruit juice, and most teas and alcohols. You can also eat foods made with baking powder and soda, cocoa, and gelatin. Avoid: Juices from high-FODMAP fruits and vegetables. And avoid fortified benji, chamomile and fennel teas, chicory-based drinks and coffee substitutes, and bouillon cubes. Follow-up care is a key part of your treatment and safety. Be sure to make and go to all appointments, and call your doctor if you are having problems. It's also a good idea to know your test results and keep a list of the medicines you take. Where can you learn more? Go to [email or web address] and sign in to your [email or web address] account. Enter L235 in the [facility] box to learn more about \"Learning About the Low FODMAP Diet for Irritable Bowel Syndrome (IBS). \"     If you do not have an account, please click on the \"Sign Up Now\" link. Current as of: August 22, 2019               Content Version: 12.6  © 9752-3634 "[employer]. Care instructions adapted under license by [facility] ([facility]). If you have questions about a medical condition or this instruction, always ask your healthcare professional. [place] [address] any warranty or liability for your use of this information. Patient Education        Learning About Diverticulosis and Diverticulitis  What are diverticulosis and diverticulitis? In diverticulosis and diverticulitis, pouches called diverticula form in the wall of the large intestine, or colon. · In diverticulosis, the pouches do not cause any pain or other symptoms.   · In diverticulitis, the pouches get inflamed or infected and cause symptoms. Doctors aren't sure what causes these pouches in the colon. But they think that a low-fiber diet may play a role. Without fiber to add bulk to the stool, the colon has to work harder than normal to push the stool forward. The pressure from this may cause pouches to form in weak spots along the colon. Some people with diverticulosis get diverticulitis. But experts don't know why this happens. What are the symptoms? · In diverticulosis, most people don't have symptoms. But pouches sometimes bleed. · In diverticulitis, symptoms may last from a few hours to a week or more. They include:  ? Belly pain. This is usually in the lower left side. It is sometimes worse when you move. This is the most common symptom. ? Fever and chills. ? Bloating and gas. ? Diarrhea or constipation. ? Nausea and sometimes vomiting.  ? Not feeling like eating. How can you prevent diverticulitis? You may be able to lower your chance of getting diverticulitis. You can do this by taking steps to prevent constipation. · Eat fruits, vegetables, beans, and whole grains every day. These foods are high in fiber. · Drink plenty of fluids. (Drink enough so that your urine is light yellow or clear like water.) If you have kidney, heart, or liver disease and have to limit fluids, talk with your doctor before you increase the amount of fluids you drink. · Get at least 30 minutes of exercise on most days of the week. Walking is a good choice. You also may want to do other activities, such as running, swimming, cycling, or playing tennis or team sports. · Take a fiber supplement, such as Citrucel or Metamucil, every day if needed. Read and follow all instructions on the label. · Schedule time each day for a bowel movement. Having a daily routine may help. Take your time and do not strain when having a bowel movement. Some people avoid nuts, seeds, berries, and popcorn. from any one food group without talking with a dietitian. You need to make sure you are still getting all the nutrients you need. Follow-up care is a key part of your treatment and safety. Be sure to make and go to all appointments, and call your doctor if you are having problems. It's also a good idea to know your test results and keep a list of the medicines you take. How can you care for yourself at home? To reduce constipation  · Include fruits, vegetables, beans, and whole grains in your diet each day. These foods are high in fiber. Slowly increase the amount of fiber you eat. This helps you avoid a lot of gas. · Drink plenty of fluids. If you have kidney, heart, or liver disease and have to limit fluids, talk with your doctor before you increase the amount of fluids you drink. · Get some exercise every day. Build up slowly to 30 to 60 minutes a day on 5 or more days of the week. · Take a fiber supplement, such as Citrucel or Metamucil, every day if needed. Read and follow all instructions on the label. · Schedule time each day for a bowel movement. Having a daily routine may help. Take your time and do not strain when having a bowel movement. · Check with your doctor before you increase the amount of fiber in your diet. For some people who have IBS, eating more fiber may make some symptoms worse. This includes bloating. To reduce diarrhea  You may try giving up foods or drinks one at a time to see whether symptoms improve. Limit or avoid the following:  · Alcohol  · Caffeine, which is found in coffee, tea, cola drinks, and chocolate  · Nicotine, from smoking or chewing tobacco  · Gas-producing foods, such as beans, broccoli, cabbage, and apples  · Dairy products that contain lactose (milk sugar), such as ice cream and milk.   · Foods and drinks high in sugar, especially fruit juice, soda, candy, and other packaged sweets (such as cookies)  · Foods high in fat, including alcantara, sausage, butter, oils,

## 2020-11-02 NOTE — TELEPHONE ENCOUNTER
Spoke with Tanya Ceballos and she will fax information for Rob Treadwell to review. Will present to FRIDA Treadwell once the office receives.

## 2020-11-02 NOTE — PROGRESS NOTES
[facility]  [facility]  [address]  [place] [address] [place] [address]  Dept: [phone]    11/2/2020    CHIEF COMPLAINT    Chief Complaint   Patient presents with    Follow-up    Abdominal Pain     HPI    Linda Gaona is a 30 y.o. male who presents   Chief Complaint   Patient presents with    Follow-up    Abdominal Pain   . Appointment for follow-up on abdominal pain    Abdominal- unchanged. Waxing and waning in severity depending on what he eats. Still reporting anorexia and nausea at times. Colonoscopy done September 22th suggested that patient has IBS. Other findings on colonoscopy: Diverticulosis in sigmoid colon, and descending colon and at the splenic flexure. Nonbleeding internal hemorrhoids noted  Patient tends to have both constipation and diarrhea. He will have 10-15 stools on some days, but will then other days he goes one time and those days. Increased abdominal pain on those days. Drinking only water with a tea q 2-3 days. Increased his fiber and notes that this causes diarrhea. Notes worsening constipation with red meats. Denies association with dairy    Notes bright red blood per rectum- noting that he often has bright red blood in stool regularly. Hemoptysis- He has seen pulmonary. This has resolved. He has had a bronchoscopy done which was WNL  Except that sleep apnea was noted during. AROLDO evaluation-noted concerns during bronchoscopy by pulmonary specialist.  Sleep study scheduled for 11/9/2020. Anxiety- D/c wellbutrin a couple of months ago due to having stomach concerns. Requesting to restart a different medication as he is struggling at work. Noting increased anxiety due to multiple health conditions and first baby being due in January. Also planning a wedding for March 13 of 2021    Onion allergy-patient reporting longstanding onion allergy causing anaphylaxis.   Patient has never taken or carried an epinephrine pen    Abdominal Pain   This is a recurrent problem. The onset quality is sudden. The problem occurs 2 to 4 times per day (intermittent increases in pain, but reports pain daily ). The problem has been waxing and waning. The pain is located in the epigastric region and LUQ. Associated symptoms include anorexia, arthralgias (bilateral knee pain. ) and nausea. Pertinent negatives include no belching, constipation, diarrhea, fever, flatus, headaches, hematuria or vomiting. The pain is aggravated by deep breathing. Relieved by: CBD gummies  He has tried nothing for the symptoms. The treatment provided no relief. His past medical history is significant for abdominal surgery and GERD. Vitals:    11/02/20 0933   BP: 123/84   Site: Right Upper Arm   Position: Sitting   Cuff Size: Large Adult   Pulse: 84   Temp: 97.2 °F (36.2 °C)   TempSrc: Temporal   SpO2: 94%   Weight: 269 lb 9.6 oz (122.3 kg)   Height: 6' 3\" (1.905 m)       Wt Readings from Last 3 Encounters:   11/02/20 269 lb 9.6 oz (122.3 kg)   10/16/20 271 lb 6.4 oz (123.1 kg)   10/08/20 270 lb (122.5 kg)     BP Readings from Last 3 Encounters:   11/02/20 123/84   10/16/20 112/80   10/08/20 (!) 149/100       REVIEW OF SYSTEMS    Review of Systems   Constitutional: Negative for fever. Respiratory: Positive for apnea ( During bronchoscopy). Negative for cough. Cardiovascular: Positive for palpitations ( Intermittent. Cardiac evaluation was within normal limits). Negative for chest pain. Gastrointestinal: Positive for abdominal pain, anorexia and nausea. Negative for constipation, diarrhea, flatus and vomiting. Genitourinary: Negative for hematuria. Musculoskeletal: Positive for arthralgias (bilateral knee pain. ). Neurological: Negative for headaches. Psychiatric/Behavioral: Positive for dysphoric mood. The patient is nervous/anxious.       PAST MEDICAL HISTORY    Past Medical History:   Diagnosis Date    Chronic back pain 2012    Diverticulosis     GERD (gastroesophageal reflux disease)     Hemoptysis     History of intussusception     OR at [facility] Lung nodule     LLL nodule    Undescended right testes        FAMILY HISTORY    Family History   Problem Relation Age of Onset    High Blood Pressure Mother     Heart Disease Mother     Heart Attack Mother 40    Cancer Father         unknown kind     Heart Attack Maternal Grandmother 61        COD     Other Sister         POTS     Heart Attack Maternal Grandfather 48    Prostate Cancer Maternal Grandfather     Stroke Maternal Grandfather         2000    No Known Problems Paternal Grandmother     No Known Problems Paternal Grandfather        SOCIAL HISTORY    Social History     Socioeconomic History    Marital status: Single     Spouse name: None    Number of children: None    Years of education: None    Highest education level: None   Occupational History    None   Social Needs    Financial resource strain: None    Food insecurity     Worry: None     Inability: None    Transportation needs     Medical: None     Non-medical: None   Tobacco Use    Smoking status: Former Smoker     Packs/day: 1.00     Years: 6.00     Pack years: 6.00     Types: Cigarettes     Last attempt to quit: 2018     Years since quittin.4    Smokeless tobacco: Never Used   Substance and Sexual Activity    Alcohol use: Yes     Comment: Occasionally, very rare    Drug use: No    Sexual activity: Yes     Partners: Female   Lifestyle    Physical activity     Days per week: None     Minutes per session: None    Stress: None   Relationships    Social connections     Talks on phone: None     Gets together: None     Attends Buddhist service: None     Active member of club or organization: None     Attends meetings of clubs or organizations: None     Relationship status: None    Intimate partner violence     Fear of current or ex partner: None     Emotionally abused: None     Physically abused: None     Forced sexual activity: None   Other Topics Concern    None   Social History Narrative    None       SURGICAL HISTORY    Past Surgical History:   Procedure Laterality Date    BRONCHOSCOPY N/A 10/8/2020    BRONCHOSCOPY performed by Monica Garza MD at [facility]      intussusception    COLONOSCOPY      TESTICLE SURGERY Right 2011    undecended testicle operation     TYMPANOSTOMY TUBE PLACEMENT  1995       CURRENT MEDICATIONS    Current Outpatient Medications   Medication Sig Dispense Refill    EPINEPHrine (EPIPEN) 0.3 MG/0.3ML SOAJ injection Use as directed for allergic reaction 2 each 2    sertraline (ZOLOFT) 25 MG tablet Take 1 tablet by mouth daily for 14 days, THEN 2 tablets daily. 60 tablet 1    albuterol sulfate  (90 Base) MCG/ACT inhaler inhale 2 puffs by mouth and INTO THE LUNGS every 4 hours if needed for wheezing      omeprazole (PRILOSEC) 40 MG delayed release capsule Take 1 capsule by mouth every morning (before breakfast) 30 capsule 5    famotidine (PEPCID) 20 MG tablet Take 1 tablet by mouth 2 times daily as needed (acid reflux) 60 tablet 5    Handicap Placard MISC by Does not apply route Expires 5 years from original written date 1 each 0    ondansetron (ZOFRAN) 4 MG tablet Take 1 tablet by mouth daily as needed for Nausea or Vomiting 30 tablet 5     No current facility-administered medications for this visit. ALLERGIES    Allergies   Allergen Reactions    Onion Anaphylaxis and Hives    Rondec Hives and Nausea And Vomiting       PHYSICAL EXAM   Physical Exam  Vitals signs and nursing note reviewed. Constitutional:       General: He is not in acute distress. Appearance: He is well-developed. He is obese. He is not diaphoretic. Interventions: Face mask in place. HENT:      Head: Normocephalic. Right Ear: Hearing normal.      Left Ear: Hearing normal.   Eyes:      General:         Right eye: No discharge.          Left eye: No discharge. Pupils: Pupils are equal.   Neck:      Musculoskeletal: Normal range of motion. Cardiovascular:      Rate and Rhythm: Normal rate and regular rhythm. Pulses: Normal pulses. Radial pulses are 2+ on the right side and 2+ on the left side. Heart sounds: Normal heart sounds, S1 normal and S2 normal. No murmur. Pulmonary:      Effort: Pulmonary effort is normal. No respiratory distress. Breath sounds: Normal breath sounds. No wheezing. Skin:     General: Skin is warm and dry. Neurological:      Mental Status: He is alert and oriented to person, place, and time. Psychiatric:         Mood and Affect: Affect is flat. Behavior: Behavior normal. Behavior is cooperative. Thought Content: Thought content does not include homicidal or suicidal ideation. Thought content does not include homicidal or suicidal plan. ASSESSMENT/PLAN  1. Left sided abdominal pain  2. Anorexia  3. Nausea    -Office requested copies of GI notes and colonoscopy which were reviewed showing diverticulosis, internal hemorrhoids and suggestion of IBS. -IBS and diverticulosis diet discussed and education provided in AVS.  -Given no improvement in abdominal pains with dietary changes thus far patient has been advised to return to GI for further evaluation    4. 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  -Patient advised of possible need for second dose of epinephrine due to possibility of second reaction after first dose of epinephrine wears off.  -Advised patient to go to ER for evaluation and treatment for severe allergic reactions  - EPINEPHrine (EPIPEN) 0.3 MG/0.3ML SOAJ injection; Use as directed for allergic reaction  Dispense: 2 each; Refill: 2    5. Anxiety  6. Dysthymia    - sertraline (ZOLOFT) 25 MG tablet; Take 1 tablet by mouth daily for 14 days, THEN 2 tablets daily.   Dispense: 60 tablet; Refill: 1  -Discussed importance of self-care and physical activity as chronic pain allows.  -Patient indicates that putting together nursery furniture and preparing the nursery helps with his anxiety and depression. --Discussed use, benefit, and side effects of prescribed medications. Barriers to medication compliance addressed. Wesley Covington received counseling on the following healthy behaviors: nutrition, exercise and medication adherence  Reviewed prior labs and health maintenance  Continue current medications, diet and exercise. Discussed use, benefit, and side effects of prescribed medications. Barriers to medication compliance addressed. Patient given educational materials - see patient instructions  Was a self-tracking handout given in paper form or via [employer]? Yes    Requested Prescriptions     Signed Prescriptions Disp Refills    EPINEPHrine (EPIPEN) 0.3 MG/0.3ML SOAJ injection 2 each 2     Sig: Use as directed for allergic reaction    sertraline (ZOLOFT) 25 MG tablet 60 tablet 1     Sig: Take 1 tablet by mouth daily for 14 days, THEN 2 tablets daily. All patient questions answered. Patient voiced understanding. Quality Measures    Body mass index is 33.7 kg/m². Elevated. Weight control planned discussed Healthy diet and regular exercise. BP: 123/84 Blood pressure is normal. Treatment plan consists of No treatment change needed. No results found for: LDLCALC, LDLCHOLESTEROL, LDLDIRECT (goal LDL reduction with dx if diabetes is 50% LDL reduction)      PHQ Scores 1/7/2020 3/26/2019   PHQ2 Score 0 0   PHQ9 Score 0 0     Interpretation of Total Score Depression Severity: 1-4 = Minimal depression, 5-9 = Mild depression, 10-14 = Moderate depression, 15-19 = Moderately severe depression, 20-27 = Severe depression     Return in about 6 weeks (around 12/14/2020) for Anxiety, depression.     (Please note that portions of this note were completed with a voice recognition program. Efforts were made to edit the dictations but occasionally words are mis-transcribed.)      Electronically signed by ANDREA Omer CNP on 11/2/20 at 9:41 AM EST

## 2020-11-15 ENCOUNTER — HOSPITAL ENCOUNTER (OUTPATIENT)
Dept: SLEEP CENTER | Age: 25
Discharge: HOME | End: 2020-11-17
Payer: MEDICAID

## 2020-11-15 PROCEDURE — 95810 POLYSOM 6/> YRS 4/> PARAM: CPT

## 2020-11-16 VITALS
HEART RATE: 66 BPM | TEMPERATURE: 95.1 F | WEIGHT: 269 LBS | BODY MASS INDEX: 33.45 KG/M2 | RESPIRATION RATE: 16 BRPM | OXYGEN SATURATION: 97 % | HEIGHT: 75 IN

## 2020-11-24 LAB — STATUS: NORMAL

## 2020-11-25 ENCOUNTER — PATIENT MESSAGE (OUTPATIENT)
Dept: FAMILY MEDICINE CLINIC | Age: 25
End: 2020-11-25

## 2020-11-25 NOTE — TELEPHONE ENCOUNTER
From: Donald Holt  To: Solomon Monterroso, ANDREA - CNP  Sent: 11/25/2020 2:01 PM EST  Subject: Non-Urgent Medical Question    Abel Mckee     Quick question my work has recently shut down due to a couple employees having Covid I am not showing any systems but would like to get tested to be safe could you point me to where I need to go or is that something you order for me ?  Thanks

## 2020-12-09 ENCOUNTER — PATIENT MESSAGE (OUTPATIENT)
Dept: FAMILY MEDICINE CLINIC | Age: 25
End: 2020-12-09

## 2020-12-09 NOTE — TELEPHONE ENCOUNTER
From: Heike Martinez  To: ANDREA Marino - CNP  Sent: 12/9/2020 11:59 AM EST  Subject: Visit Follow-Up Question    Camila Fuller     Been trying to call the office for a couple days and having trouble getting thru is it too late to switch our appointment we have coming up to a virtual visit ?!      Thanks

## 2020-12-17 ENCOUNTER — TELEMEDICINE (OUTPATIENT)
Dept: FAMILY MEDICINE CLINIC | Age: 25
End: 2020-12-17
Payer: MEDICAID

## 2020-12-17 PROBLEM — K57.90 DIVERTICULOSIS: Status: ACTIVE | Noted: 2020-12-17

## 2020-12-17 PROCEDURE — G8427 DOCREV CUR MEDS BY ELIG CLIN: HCPCS | Performed by: NURSE PRACTITIONER

## 2020-12-17 PROCEDURE — 99215 OFFICE O/P EST HI 40 MIN: CPT | Performed by: NURSE PRACTITIONER

## 2020-12-17 RX ORDER — LORAZEPAM 1 MG/1
1 TABLET ORAL EVERY 8 HOURS PRN
Qty: 30 TABLET | Refills: 0 | Status: SHIPPED | OUTPATIENT
Start: 2020-12-17 | End: 2021-01-16

## 2020-12-17 ASSESSMENT — ENCOUNTER SYMPTOMS
RESPIRATORY NEGATIVE: 1
VOMITING: 0
ABDOMINAL PAIN: 1
COUGH: 0
DIARRHEA: 0
CONSTIPATION: 0
NAUSEA: 1

## 2020-12-17 NOTE — PROGRESS NOTES
[facility]  [address]  [facility] [address]  Dept: [phone]    2020    TELEHEALTH EVALUATION -- Audio/Visual (During SKCKQ-46 public health emergency)  Shanell Clinton (:  1995) has requested an audio/video evaluation for the following concern(s):    HPI:  Appointment to f/u on anxiety/depression. Depression/Anxiety-was unable to try Zoloft as ordered at our last appointment due to insurance prior authorization and restrictions. We have not received any letters from the insurance, but Jose Montez indicated that the insurance would pay for the Zoloft 50 mg not the 25. Patient would still like to proceed with trying this medication. Patient requesting refill on Ativan 1 mg. Last filled May 2019. Patient continues to take this very sparingly    Diverticulitis-patient to follow-up with GI due to pending birth of son. Will follow-up after the first of the year. Apneic episodes noted during bronchoscopy-following with pulmonary. Sleep study performed showing no significant apneic episodes. Patient-Reported Vitals 2020   Patient-Reported Weight 280lb   Patient-Reported Height 6ft3in   Patient-Reported Temperature 97.9        There were no vitals filed for this visit. Wt Readings from Last 3 Encounters:   20 269 lb (122 kg)   20 269 lb 9.6 oz (122.3 kg)   10/16/20 271 lb 6.4 oz (123.1 kg)     BP Readings from Last 3 Encounters:   20 123/84   10/16/20 112/80   10/08/20 (!) 149/100       REVIEW OF SYSTEMS:   Review of Systems   Constitutional: Negative for chills and fever. Respiratory: Negative. Negative for cough. Cardiovascular: Positive for palpitations ( Intermittent. Cardiac evaluation was within normal limits). Negative for chest pain. Gastrointestinal: Positive for abdominal pain and nausea. Negative for constipation, diarrhea and vomiting.    Genitourinary: Negative for appointment.  -Encourage self-care and increased physical activity as previously discussed aid in anxiety and depression treatment. -Refill of Ativan for as needed use. Patient has not filled since May 2019    3. Left sided abdominal pain  4. Diverticulosis    -Patient does not feel significant improvement in symptoms since dietary changes were made. Patient to return to GI for follow-up after the first of the year. 5. Witnessed Apnea     -Witnessed during bronchoscopy. -Reviewed sleep study completed recently from [facility] showing no obstructive sleep apnea. Return in about 6 weeks (around 1/28/2021) for depression, Anxiety. Chivo Maldonado is a 30 y.o. male being evaluated by a Virtual Visit (video visit) encounter to address concerns as mentioned above. A caregiver was present when appropriate. Due to this being a TeleHealth encounter (During RNRKC-41 public health emergency), evaluation of the following organ systems was limited: Vitals/Constitutional/EENT/Resp/CV/GI//MS/Neuro/Skin/Heme-Lymph-Imm. Pursuant to the emergency declaration under the [address] [place] [address] authority and the [employer] and [employer] Act, this Virtual Visit was conducted with patient's (and/or legal guardian's) consent, to reduce the patient's risk of exposure to COVID-19 and provide necessary medical care. The patient (and/or legal guardian) has also been advised to contact this office for worsening conditions or problems, and seek emergency medical treatment and/or call 911 if deemed necessary. Services were provided through a video synchronous discussion virtually to substitute for in-person clinic visit. Patient and provider were located at their individual homes.     --ANDREA Mancera - CNP on 12/17/2020 at 10:02 AM    (Please note that portions of this note were completed with a voice recognition program. Efforts were made to edit the dictations but occasionally words are mis-transcribed. )      An electronic signature was used to authenticate this note.

## 2021-01-23 ENCOUNTER — PATIENT MESSAGE (OUTPATIENT)
Dept: FAMILY MEDICINE CLINIC | Age: 26
End: 2021-01-23

## 2021-01-23 DIAGNOSIS — L30.1 DYSHIDROTIC ECZEMA: Primary | ICD-10-CM

## 2021-01-25 NOTE — TELEPHONE ENCOUNTER
From: Gene Mims  To: ANDREA Shine CNP  Sent: 1/23/2021 4:04 AM EST  Subject: Non-Urgent Medical Question    DeleMya Hussein been getting these patches of eczema on my palms I was wondering if you had any recommendations on ways to get relief

## 2021-01-28 PROBLEM — M43.8X9: Status: ACTIVE | Noted: 2021-01-28

## 2021-01-28 PROBLEM — M47.817 LUMBOSACRAL SPONDYLOSIS WITHOUT MYELOPATHY: Status: ACTIVE | Noted: 2021-01-28

## 2021-01-30 RX ORDER — TRIAMCINOLONE ACETONIDE 0.25 MG/G
CREAM TOPICAL
Qty: 80 G | Refills: 0 | Status: SHIPPED | OUTPATIENT
Start: 2021-01-30 | End: 2021-09-07

## 2021-09-07 ENCOUNTER — OFFICE VISIT (OUTPATIENT)
Dept: FAMILY MEDICINE CLINIC | Age: 26
End: 2021-09-07
Payer: COMMERCIAL

## 2021-09-07 ENCOUNTER — PATIENT MESSAGE (OUTPATIENT)
Dept: FAMILY MEDICINE CLINIC | Age: 26
End: 2021-09-07

## 2021-09-07 VITALS
OXYGEN SATURATION: 98 % | TEMPERATURE: 97.9 F | HEART RATE: 82 BPM | SYSTOLIC BLOOD PRESSURE: 124 MMHG | WEIGHT: 227 LBS | BODY MASS INDEX: 28.23 KG/M2 | RESPIRATION RATE: 16 BRPM | HEIGHT: 75 IN | DIASTOLIC BLOOD PRESSURE: 83 MMHG

## 2021-09-07 DIAGNOSIS — G89.29 CHRONIC THORACIC SPINE PAIN: ICD-10-CM

## 2021-09-07 DIAGNOSIS — R31.29 OTHER MICROSCOPIC HEMATURIA: ICD-10-CM

## 2021-09-07 DIAGNOSIS — K57.90 DIVERTICULOSIS: ICD-10-CM

## 2021-09-07 DIAGNOSIS — R10.32 LEFT LOWER QUADRANT ABDOMINAL PAIN: Primary | ICD-10-CM

## 2021-09-07 DIAGNOSIS — R35.0 FREQUENCY OF URINATION: ICD-10-CM

## 2021-09-07 DIAGNOSIS — M54.6 CHRONIC THORACIC SPINE PAIN: ICD-10-CM

## 2021-09-07 LAB
BILIRUBIN, POC: NEGATIVE
BLOOD URINE, POC: ABNORMAL
CLARITY, POC: CLEAR
COLOR, POC: YELLOW
GLUCOSE URINE, POC: NEGATIVE
KETONES, POC: NEGATIVE
LEUKOCYTE EST, POC: NEGATIVE
NITRITE, POC: NEGATIVE
PH, POC: 5.5
PROTEIN, POC: NEGATIVE
SPECIFIC GRAVITY, POC: 1.02
UROBILINOGEN, POC: ABNORMAL

## 2021-09-07 PROCEDURE — G8427 DOCREV CUR MEDS BY ELIG CLIN: HCPCS | Performed by: NURSE PRACTITIONER

## 2021-09-07 PROCEDURE — G8417 CALC BMI ABV UP PARAM F/U: HCPCS | Performed by: NURSE PRACTITIONER

## 2021-09-07 PROCEDURE — 81003 URINALYSIS AUTO W/O SCOPE: CPT | Performed by: NURSE PRACTITIONER

## 2021-09-07 PROCEDURE — 1036F TOBACCO NON-USER: CPT | Performed by: NURSE PRACTITIONER

## 2021-09-07 PROCEDURE — 99214 OFFICE O/P EST MOD 30 MIN: CPT | Performed by: NURSE PRACTITIONER

## 2021-09-07 RX ORDER — METRONIDAZOLE 500 MG/1
500 TABLET ORAL 2 TIMES DAILY
Qty: 14 TABLET | Refills: 0 | Status: SHIPPED | OUTPATIENT
Start: 2021-09-07 | End: 2021-09-14

## 2021-09-07 RX ORDER — CIPROFLOXACIN 500 MG/1
500 TABLET, FILM COATED ORAL 2 TIMES DAILY
Qty: 14 TABLET | Refills: 0 | Status: SHIPPED | OUTPATIENT
Start: 2021-09-07 | End: 2021-09-14

## 2021-09-07 SDOH — ECONOMIC STABILITY: FOOD INSECURITY: WITHIN THE PAST 12 MONTHS, YOU WORRIED THAT YOUR FOOD WOULD RUN OUT BEFORE YOU GOT MONEY TO BUY MORE.: NEVER TRUE

## 2021-09-07 SDOH — ECONOMIC STABILITY: FOOD INSECURITY: WITHIN THE PAST 12 MONTHS, THE FOOD YOU BOUGHT JUST DIDN'T LAST AND YOU DIDN'T HAVE MONEY TO GET MORE.: NEVER TRUE

## 2021-09-07 SDOH — ECONOMIC STABILITY: TRANSPORTATION INSECURITY
IN THE PAST 12 MONTHS, HAS LACK OF TRANSPORTATION KEPT YOU FROM MEETINGS, WORK, OR FROM GETTING THINGS NEEDED FOR DAILY LIVING?: NO

## 2021-09-07 SDOH — ECONOMIC STABILITY: TRANSPORTATION INSECURITY
IN THE PAST 12 MONTHS, HAS THE LACK OF TRANSPORTATION KEPT YOU FROM MEDICAL APPOINTMENTS OR FROM GETTING MEDICATIONS?: NO

## 2021-09-07 ASSESSMENT — ENCOUNTER SYMPTOMS
ABDOMINAL PAIN: 1
BACK PAIN: 1
SHORTNESS OF BREATH: 0
BLOOD IN STOOL: 1
VOMITING: 0
COUGH: 0
RESPIRATORY NEGATIVE: 1
DIARRHEA: 0
BOWEL INCONTINENCE: 0
CONSTIPATION: 0
ABDOMINAL DISTENTION: 1
NAUSEA: 0

## 2021-09-07 ASSESSMENT — PATIENT HEALTH QUESTIONNAIRE - PHQ9
SUM OF ALL RESPONSES TO PHQ QUESTIONS 1-9: 0
1. LITTLE INTEREST OR PLEASURE IN DOING THINGS: 0
SUM OF ALL RESPONSES TO PHQ QUESTIONS 1-9: 0
2. FEELING DOWN, DEPRESSED OR HOPELESS: 0
SUM OF ALL RESPONSES TO PHQ QUESTIONS 1-9: 0
SUM OF ALL RESPONSES TO PHQ9 QUESTIONS 1 & 2: 0

## 2021-09-07 ASSESSMENT — SOCIAL DETERMINANTS OF HEALTH (SDOH): HOW HARD IS IT FOR YOU TO PAY FOR THE VERY BASICS LIKE FOOD, HOUSING, MEDICAL CARE, AND HEATING?: NOT HARD AT ALL

## 2021-09-07 NOTE — PROGRESS NOTES
[facility]  [facility]  [address]  [facility] [address]  Dept: [phone]    9/7/2021    CHIEF COMPLAINT    Chief Complaint   Patient presents with    Abdominal Pain    Back Pain     middle to lower       HPI    Joe Leon is a 27 y.o. male who presents   Chief Complaint   Patient presents with    Abdominal Pain    Back Pain     middle to lower     Appointment to discuss back pain and stomach issues. Son was born in January and he is staying home with him. Thoracic Spine Pain- Acute increase on chronic pain. Mid to lower back pain with a constant ache that is sometimes sharp. Goes between shoulder blades. Back pain has been markedly worse since April with some steady increase in pain since then. Will occasionally note numbness and tingling in arms and numbness. He will note the numbness will last for 5-7 minutes. Will occur in different positions to include lying down and sitting. Occurs bilaterally, however R>L  Also noting tremors in bilateral legs when lying down. He indicates that his legs will feel heavy, stiff and will rarely they feel numb. Has previously seen Dr. Kristine Paez at the [facility], but was told    Abdominal pain, weight loss and lack of appetite- He notes that he's lost approximately 38 lbs since May when he was 89. He is \"nikos if he eats one meal a day\". He is having a lot of abdominal pain to the touch. Pain is primarily in the left lower quadrant, but will move up the abdomen to LUQ and epigastric region. He has very inconsistent stool with some days that are diarrhea, rarely normal consistency, but mostly \"rabbit pellets\". Drinking 7 bottles of water or 1 power aid per day. Drinks 1/2 cup of coffee per day. He has previously seen [address] [place] [address] and been diagnosed with diverticulitis and IBS. He denies fevers, chills and nausea. He does not feel like acid reflux. Taking omeprazole daily.      Frequent Urination- reports urinary frequency of every 20-30 minutes while awake. Intermittent burning. Denies penile discharge or pain in scrotum. Anxiety/Depression- stopped taking Zoloft due to not feeling that it made him feel any different. Denies side effects. Back Pain  This is a chronic problem. The current episode started more than 1 year ago. The problem has been gradually worsening since onset. The pain is present in the lumbar spine and thoracic spine. The quality of the pain is described as aching (Sharp pain at times). Associated symptoms include abdominal pain, leg pain, paresthesias, tingling, weakness and weight loss. Pertinent negatives include no bladder incontinence, bowel incontinence, chest pain, fever or headaches. He has tried bed rest, heat, home exercises, ice, muscle relaxant, NSAIDs and walking for the symptoms. The treatment provided mild relief. Abdominal Pain  This is a recurrent problem. The current episode started more than 1 month ago (pain woresened markedly in May). The problem occurs constantly. The pain is located in the LLQ, LUQ and epigastric region. The quality of the pain is aching and sharp. The abdominal pain does not radiate. Associated symptoms include anorexia, frequency and weight loss. Pertinent negatives include no constipation, diarrhea, fever, headaches, nausea or vomiting. The pain is aggravated by movement, palpation and certain positions. The pain is relieved by nothing. Prior diagnostic workup includes GI consult, CT scan, upper endoscopy, lower endoscopy and surgery. His past medical history is significant for abdominal surgery, GERD and irritable bowel syndrome.      Vitals:    09/07/21 0929   BP: 124/83   Site: Left Upper Arm   Position: Sitting   Cuff Size: Large Adult   Pulse: 82   Resp: 16   Temp: 97.9 °F (36.6 °C)   TempSrc: Temporal   SpO2: 98%   Weight: 227 lb (103 kg)   Height: 6' 3\" (1.905 m)       Wt Readings from Last 3 Encounters:   09/07/21 227 lb (103 kg)   11/16/20 269 lb (122 kg)   11/02/20 269 lb 9.6 oz (122.3 kg)     BP Readings from Last 3 Encounters:   09/07/21 124/83   11/02/20 123/84   10/16/20 112/80       REVIEW OF SYSTEMS    Review of Systems   Constitutional: Positive for weight loss. Negative for chills, fatigue and fever. Eyes: Negative for visual disturbance. Respiratory: Negative. Negative for cough and shortness of breath. Cardiovascular: Negative. Negative for chest pain and palpitations. Gastrointestinal: Positive for abdominal distention, abdominal pain, anorexia and blood in stool (Reports this occurs once every 3 weeks or so). Negative for bowel incontinence, constipation, diarrhea, nausea and vomiting. Genitourinary: Positive for frequency. Negative for bladder incontinence. Musculoskeletal: Positive for back pain (See HPI ). Neurological: Positive for tingling, weakness and paresthesias. Negative for dizziness and headaches.        PAST MEDICAL HISTORY    Past Medical History:   Diagnosis Date    Chronic back pain 2012    Diverticulosis     GERD (gastroesophageal reflux disease)     Hemoptysis 2020    History of intussusception     OR at [facility] Lung nodule 2020    LLL nodule    Undescended right testes 2011       FAMILY HISTORY    Family History   Problem Relation Age of Onset    High Blood Pressure Mother     Heart Disease Mother     Heart Attack Mother 40    Cancer Father         unknown kind     Heart Attack Maternal Grandmother 61        COD     Other Sister         POTS     Heart Attack Maternal Grandfather 48    Prostate Cancer Maternal Grandfather     Stroke Maternal Grandfather         2000    No Known Problems Paternal Grandmother     No Known Problems Paternal Grandfather        SOCIAL HISTORY    Social History     Socioeconomic History    Marital status: Single     Spouse name: None    Number of children: None    Years of education: None    Highest education level: None Dispense Refill    metroNIDAZOLE (FLAGYL) 500 MG tablet Take 1 tablet by mouth 2 times daily for 7 days Avoid drinking alcohol during treatment or for the 3 days following treatment. 14 tablet 0    ciprofloxacin (CIPRO) 500 MG tablet Take 1 tablet by mouth 2 times daily for 7 days 14 tablet 0    EPINEPHrine (EPIPEN) 0.3 MG/0.3ML SOAJ injection Use as directed for allergic reaction 2 each 2    albuterol sulfate  (90 Base) MCG/ACT inhaler inhale 2 puffs by mouth and INTO THE LUNGS every 4 hours if needed for wheezing      omeprazole (PRILOSEC) 40 MG delayed release capsule Take 1 capsule by mouth every morning (before breakfast) 30 capsule 5    Handicap Placard MISC by Does not apply route Expires 5 years from original written date 1 each 0    ondansetron (ZOFRAN) 4 MG tablet Take 1 tablet by mouth daily as needed for Nausea or Vomiting 30 tablet 5     No current facility-administered medications for this visit. ALLERGIES    Allergies   Allergen Reactions    Onion Anaphylaxis and Hives    Rondec-D  [Chlophedianol-Pseudoephedrine] Other (See Comments)    Rondec Hives and Nausea And Vomiting       PHYSICAL EXAM   Physical Exam  Vitals and nursing note reviewed. Constitutional:       General: He is not in acute distress. Appearance: He is well-developed, well-groomed and overweight. He is not diaphoretic. Interventions: Face mask in place. HENT:      Head: Normocephalic. Right Ear: Hearing normal.      Left Ear: Hearing normal.   Eyes:      General:         Right eye: No discharge. Left eye: No discharge. Pupils: Pupils are equal.   Cardiovascular:      Rate and Rhythm: Normal rate and regular rhythm. Pulses: Normal pulses. Radial pulses are 2+ on the right side and 2+ on the left side. Heart sounds: Normal heart sounds, S1 normal and S2 normal. No murmur heard. Pulmonary:      Effort: Pulmonary effort is normal. No respiratory distress. Breath sounds: Normal breath sounds. No wheezing. Abdominal:      Tenderness: There is abdominal tenderness in the epigastric area, left upper quadrant and left lower quadrant. Hernia: No hernia is present. Musculoskeletal:      Thoracic back: Bony tenderness present. Decreased range of motion. Lumbar back: Tenderness and bony tenderness present. Decreased range of motion. Skin:     General: Skin is warm and dry. Neurological:      Mental Status: He is alert and oriented to person, place, and time. Psychiatric:         Behavior: Behavior normal. Behavior is cooperative. ASSESSMENT/PLAN  1. Left lower quadrant abdominal pain  Assessment & Plan:   Uncontrolled, lifestyle modifications recommended to include dietary modification as described in AVS.  Patient advised to return to Dr. Joseph Silva office for further evaluation as he has not been seen since his colonoscopy  Orders:  -     metroNIDAZOLE (FLAGYL) 500 MG tablet; Take 1 tablet by mouth 2 times daily for 7 days Avoid drinking alcohol during treatment or for the 3 days following treatment. , Disp-14 tablet, R-0Normal  -     ciprofloxacin (CIPRO) 500 MG tablet; Take 1 tablet by mouth 2 times daily for 7 days, Disp-14 tablet, R-0Normal  -     POCT Urinalysis No Micro (Auto)  2. Diverticulosis  Assessment & Plan:   Unclear if left lower quadrant pain is related to diverticulitis flare. Will treat empirically with Flagyl and Cipro. Patient advised on dietary modifications in AVS and will return to Dr. Joseph Silva office for further evaluation. Advised patient to incorporate probiotics while taking strong antibiotics. 3. Frequency of urination  Assessment & Plan:   Unclear etiology. Discussed urology referral via MyChart based on trace amount of blood still present in urine  Orders:  -     POCT Urinalysis No Micro (Auto)  4.  Other microscopic hematuria  Assessment & Plan:   Asymptomatic, Advised patient via MyChart that urology referral is indicated given the trace to small amount of blood found in urine since April 2019. Results for orders placed or performed in visit on 09/07/21   POCT Urinalysis No Micro (Auto)   Result Value Ref Range    Color, UA yellow     Clarity, UA clear     Glucose, UA POC negative     Bilirubin, UA negative     Ketones, UA negative     Spec Grav, UA 1.025     Blood, UA POC trace-intact     pH, UA 5.5     Protein, UA POC negative     Urobilinogen, UA 0.2 e.u./dL     Leukocytes, UA negative     Nitrite, UA negative        Orders:  -     [facility] - Joaquín Hill MD, Urology, [place]  5. Chronic thoracic spine pain  Assessment & Plan:   Uncontrolled, Patient wishes to not pursue MRI or referral to specialist at this time. Patient to notify office if symptoms worsen or fail to improve      Trish Loza received counseling on the following healthy behaviors: nutrition, exercise and medication adherence  Reviewed prior labs and health maintenance. Continue current medications, diet and exercise. Discussed use, benefit, and side effects of prescribed medications. Barriers to medication compliance addressed. Patient given educational materials - see patient instructions. All patient questions answered. Patient voiced understanding. Return in about 6 weeks (around 10/19/2021) for Abdominal pain.     (Please note that portions of this note were completed with a voice recognition program. Efforts were made to edit the dictations but occasionally words are mis-transcribed.)      Electronically signed by ANDREA Duran CNP on 9/7/21 at 9:33 AM EDT

## 2021-09-07 NOTE — ASSESSMENT & PLAN NOTE
Uncontrolled, Patient wishes to not pursue MRI or referral to specialist at this time.   Patient to notify office if symptoms worsen or fail to improve

## 2021-09-07 NOTE — ASSESSMENT & PLAN NOTE
Uncontrolled, lifestyle modifications recommended to include dietary modification as described in AVS.  Patient advised to return to Dr. Pk Sanabrai office for further evaluation as he has not been seen since his colonoscopy

## 2021-09-07 NOTE — TELEPHONE ENCOUNTER
From: Luly Caceres  Sent: 9/7/2021 11:08 AM EDT  To: Mariam Loving Clinical Staff  Subject: RE: Urinary Testing    Yes I do remember you saying that I don't think it would be a bad idea to see one and find out what's going on!     ----- Message -----  From: ANDREA Cisneros - CNP  Sent: 9/7/21, 10:52 AM  To: Luly Caceres  Subject: Urinary Testing    Katarina Hinson,     Your urine showed a trace amount of blood. It is shown this before with our office and I believe requires some further investigation at this point. I would suggest that you see a urologist. We talked about seeing urology in the past, but I think because worsening so many specialist you wanted to hold off. Your urine was negative for blood in January 2019, but since April 2019 there has been a small amount to trace amounts of blood.     Please let me know if you have any questions and if you are willing to see a specialist.    [address] [facility] [address] Jonathon Solorio

## 2021-09-07 NOTE — PATIENT INSTRUCTIONS
Dr. Luis Crisostomo     Address: [address] [place] [address], [facility], [address] [facility]  Phone: [phone]  FAX: [phone]      Patient Education        Diet for Irritable Bowel Syndrome: Care Instructions  Your Care Instructions     Irritable bowel syndrome, or IBS, is a problem with the intestines. IBS can cause belly pain, bloating, gas, constipation, and diarrhea. Most people can control their symptoms by changing their diet and easing stress. No specific foods cause everyone with IBS to have symptoms. Many people find that they feel better by limiting or eliminating foods that may bring on symptoms. Make sure you don't stop eating all foods from any one food group without talking with a dietitian. You need to make sure you are still getting all the nutrients you need. Follow-up care is a key part of your treatment and safety. Be sure to make and go to all appointments, and call your doctor if you are having problems. It's also a good idea to know your test results and keep a list of the medicines you take. How can you care for yourself at home? To reduce constipation  · Include fruits, vegetables, beans, and whole grains in your diet each day. These foods are high in fiber. Slowly increase the amount of fiber you eat. This helps you avoid a lot of gas. · Drink plenty of fluids. If you have kidney, heart, or liver disease and have to limit fluids, talk with your doctor before you increase the amount of fluids you drink. · Get some exercise every day. Build up slowly to 30 to 60 minutes a day on 5 or more days of the week. · Take a fiber supplement, such as Citrucel or Metamucil, every day if needed. Read and follow all instructions on the label. · Schedule time each day for a bowel movement. Having a daily routine may help. Take your time and do not strain when having a bowel movement. · Check with your doctor before you increase the amount of fiber in your diet.  For some people who have IBS, eating more fiber may make some symptoms worse. This includes bloating. To reduce diarrhea  You may try giving up foods or drinks one at a time to see whether symptoms improve. Limit or avoid the following:  · Alcohol  · Caffeine, which is found in coffee, tea, cola drinks, and chocolate  · Nicotine, from smoking or chewing tobacco  · Gas-producing foods, such as beans, broccoli, cabbage, and apples  · Dairy products that contain lactose (milk sugar), such as ice cream and milk. · Foods and drinks high in sugar, especially fruit juice, soda, candy, and other packaged sweets (such as cookies)  · Foods high in fat, including alcantara, sausage, butter, oils, and anything deep-fried  · Sorbitol and xylitol, artificial sweeteners found in some sugarless candies and chewing gum  Keep track of foods  · Some people with IBS use a daily food diary to keep track of what they eat and whether they have any symptoms after eating certain foods. The diary also can be a good way to record what is going on in your life. · Stress plays a role in IBS. So if you are aware that certain stresses bring on symptoms, you can try to reduce those stresses. Keep mealtimes pleasant  · Try to maintain a pleasant environment when you eat. This may reduce stress that can make symptoms likely to occur. · Give yourself plenty of time to eat, rather than eating on the go. Chew your food slowly. Try not to swallow air, which can cause bloating. Where can you learn more? Go to https://Confer TechnologiesaltafAssertID.TrackMaven. org and sign in to your [employer] account. Enter L929 in the [facility] box to learn more about \"Diet for Irritable Bowel Syndrome: Care Instructions. \"     If you do not have an account, please click on the \"Sign Up Now\" link. Current as of: December 17, 2020               Content Version: 12.9  © 1235-7019 Healthwise, Incorporated. Care instructions adapted under license by [facility] ([facility]).  If you have questions about a medical condition or this instruction, always ask your healthcare professional. [place] [address] any warranty or liability for your use of this information. Patient Education        Irritable Bowel Syndrome: Care Instructions  Your Care Instructions  Irritable bowel syndrome, or IBS, is a problem with the intestines that causes belly pain, bloating, gas, constipation, and diarrhea. The cause of IBS is not well known. IBS can last for many years, but it does not get worse over time or lead to serious disease. Most people can control their symptoms by changing their diet and reducing stress. Follow-up care is a key part of your treatment and safety. Be sure to make and go to all appointments, and call your doctor if you are having problems. It's also a good idea to know your test results and keep a list of the medicines you take. How can you care for yourself at home? · Prevent diarrhea:  ? Limit the amount of high-fiber foods you eat. This includes vegetables, fruits, whole-grain breads and pasta, high-fiber cereal, and brown rice. ? Limit dairy products. ? Limit artificial sweeteners such as sorbitol and xylitol. · Avoid constipation:  ? Include fruits, vegetables, beans, and whole grains in your diet each day. These foods are high in fiber. ? Drink plenty of fluids. If you have kidney, heart, or liver disease and have to limit fluids, talk with your doctor before you increase the amount of fluids you drink. ? Get some exercise every day. Build up slowly to 30 to 60 minutes a day on 5 or more days of the week. ? Take a fiber supplement, such as Citrucel or Metamucil, every day if needed. Read and follow all instructions on the label. ? Schedule time each day for a bowel movement. Having a daily routine may help. Take your time and do not strain when having a bowel movement. · To help relieve bloating or gas, avoid foods such as beans, cabbage, cauliflower, or broccoli.   · Keep a daily diary of what you eat and what symptoms you have. This may help find foods that cause you problems. · Eat slowly. Try to make mealtime relaxing. · Find ways to reduce stress. When should you call for help? Call your doctor now or seek immediate medical care if:    · Your pain is different than usual or occurs with fever.     · You lose weight without trying, or you lose your appetite and you do not know why.     · Your symptoms often wake you from sleep.     · Your stools are black and tarlike or have streaks of blood. Watch closely for changes in your health, and be sure to contact your doctor if:    · Your IBS symptoms get worse or begin to disrupt your day-to-day life.     · You become more tired than usual.     · Your home treatment stops working. Where can you learn more? Go to https://[employer].Reaching [email or web address] Outdoor Friends (ROOF). org and sign in to your [employer] account. Enter V137 in the [employer] box to learn more about \"Irritable Bowel Syndrome: Care Instructions. \"     If you do not have an account, please click on the \"Sign Up Now\" link. Current as of: February 10, 2021               Content Version: 12.9  © 6695-7694 [employer]. Care instructions adapted under license by [facility] ([facility]). If you have questions about a medical condition or this instruction, always ask your healthcare professional. [address] any warranty or liability for your use of this information. Patient Education        Learning About the Low FODMAP Diet for Irritable Bowel Syndrome (IBS)  What is the low-FODMAP diet? A low-FODMAP diet is a way to find out what foods give you digestion problems. You stop eating certain high-FODMAP foods for about 2 months. Then you add them back to see how your body reacts. This is called a \"challenge diet. \" A dietitian or doctor can help you follow this diet. FODMAPs are carbohydrates. They are in many types of foods. FODMAP stands for:  · F ermentable.   · O ligosaccharides. · D isaccharides. · M onosaccharides. · A nd p olyols. If you have digestive problems, some of these foods can make your symptoms worse. When you are on this diet, you can still eat certain fruits and vegetables. You can also eat certain grains, meats, fish, and lactose-free milks. What is it used for? If you have irritable bowel syndrome (IBS), you can ease your symptoms by not eating some types of foods. Some people also use this diet for inflammatory bowel disease (IBD) or some food intolerances. High-FODMAP foods can be hard to digest. They pull more fluid into your intestines. They are also easily fermented. This can lead to bloating, belly pain, gas, and diarrhea. The low-FODMAP diet can help you figure out what foods to avoid. And it can help you find foods that are easier to digest.  This diet can help with symptoms of some digestive diseases. But it's not a cure. You will still need to manage your condition. How does it work? You will work with a doctor or dietitian when you start the diet. At first, you won't eat any high-FODMAP foods for a few weeks. Go to www.[employer]. Soneter to learn more about this diet. Jignesh Maria also find links to an radu for your phone or other device. You'll find low-FODMAP cookbooks there too. After 6 to 8 weeks, you will start to try high-FODMAP foods again. You will add those foods back to your diet, one group at a time. Your doctor or dietitian will probably have you wait a few days before you add each new group of those foods. Keep a food diary. You can write down the foods you try and note how they make you feel. After a few weeks, you may have a better idea of what foods you should avoid and what foods make you feel your best.  What are the risks? There is some risk of not getting all of the vitamins and nutrients you need on the low-FODMAP diet. These include:  · Folate. · Thiamin. · Vitamin B6.  · Calcium. · Vitamin D.   Your dietitian or doctor can help you find other sources of these if needed. This diet may limit your fiber intake. Try to plan your meals to include other sources of fiber. What foods are on the low-FODMAP diet? Here is a guide to foods that you can eat, plus the foods that you should avoid, when you are on the low-FODMAP diet. Grains  Okay to eat: Foods made from grains like arrowroot, buckwheat, corn, millet, and oats. You can also eat potato, quinoa, rice, sorghum, tapioca, and teff. Cereals, pasta, breads, corn tortillas and baked goods made from these grains are also okay. (These grains may be labeled \"gluten-free. \")  Avoid: Grains like wheat, barley, and rye. Avoid ingredients such as bulgur, couscous, durum, and semolina. And avoid cereals, breads, and pastas made from these grains. Avoid chickpea, lentil, and pea flour. Proteins  Okay to eat: Most meat, fish, and eggs without high-FODMAP sauces. You can have small amounts of almonds or hazelnuts (10 nuts). Macadamia nuts, peanuts, pecans, pine nuts, and walnuts are also okay. You can also eat laura and pumpkin seeds, tofu, and tempeh. Avoid: Beans, chickpeas, lentils, and soybeans. Avoid pistachio and cashew nuts. And some sausages may have high-FODMAP ingredients. Dairy  Okay to eat: Lactose-free dairy milks. Rice milk and almond milk are okay. So are lactose-free yogurts, kefirs, ice creams, and sorbet from low-FODMAP fruits and sweeteners. (These are often labeled \"lactose-free. \") You can have small amounts (2 Tbsp) of cottage, cream, or ricotta cheese. Hard cheeses like cheddar, Soldier, ROSS, and Swiss are okay. So are small amounts (1 oz) of aged or ripened cheeses like Brie, blue, and feta. Avoid: Milk, including cow, goat, and sheep. Avoid condensed or evaporated milk, buttermilk, custard, cream, sour cream, yogurt, and ice cream. Avoid soy milk. (Check sauces for dairy ingredients.)  Vegetables  Okay to eat:  Bamboo shoots, bell peppers, bok angella, up to ½ cup of broccoli or cabbage (red or white), and cucumbers. Eggplant, green beans, lettuce, olives, parsnips, and potatoes are okay to eat. So are pumpkin, rutabaga, seaweed, sprouts, Swiss chard, and spinach. You can eat scallions (green part only) and squash (not butternut). You can eat tomatoes, turnips, watercress, yams, and zucchini. You can also have small amounts of artichoke hearts (from can, 1 oz), carrots, corn (½ cob), and sweet potato (½ cup). Avoid: Artichokes, asparagus, [place] sprouts, sharif cabbage, cauliflower, and celery. And avoid garlic, leeks, mushrooms, okra, onions, scallions (white part), shallots, and peas. Fruits  Okay to eat: Bananas, blueberries, cantaloupe, coconut, grapes, and honeydew. Kiwi, steff, limes, oranges, passion fruit, papaya, and pineapple are also okay. You can eat plantain, raspberries, rhubarb, star fruit, strawberries, tangelo, and tangerine. You can also have small amounts of dried banana chips (up to 10 chips), dried cranberries (1 Tbsp), and shredded coconut (up to ¼ cup). Avoid: Apples, applesauce, apricots, avocados, blackberries, boysenberries, and cherries. Also avoid dates, figs, grapefruit, guava, lychee, and mangoes. Don't eat nectarines, peaches, pears, persimmon, plums, prunes, tamarillo, or watermelon. And limit most canned and dried fruits. Oils, spices, condiments, and sweeteners  Okay to eat: Vegetable oils (including garlic infused), butter, ghee, lard, and margarine (no trans fat). You can have most fresh herbs like basil, chives, coriander, ana, parsley, rosemary and thyme. You can have salt, jams made from low-FODMAP fruits, mayonnaise, and mustard. Soy sauce, hot sauce (no garlic), tamari, and vinegar are also okay. Sweeteners that are okay include sugar (sucrose), powdered (confectioner's) sugar, brown sugar, glucose, and maple syrup. You can also have some artificial sweeteners like aspartame, saccharine, and stevia.   Avoid: Osmany, hummus, jellies, garlic sauces, and gravies made with onion or garlic. Avoid pickles, relish, some salad dressings and soup stocks, salsa, and tomato paste. And avoid sauces and other foods with high fructose corn syrup, honey, molasses, and agave. Avoid artificial sweeteners (isomalt, mannitol, malitol, sorbitol, and xylitol). Avoid corn syrup solids, fructose, fruit juice concentrate, and polydextrose. Other foods and drinks  Okay to have: Water, soda water, tonic, soft drinks sweetened with sugar, ½ cup of low-FODMAP fruit juice, and most teas and alcohols. You can also eat foods made with baking powder and soda, cocoa, and gelatin. Avoid: Juices from high-FODMAP fruits and vegetables. And avoid fortified benji, chamomile and fennel teas, chicory-based drinks and coffee substitutes, and bouillon cubes. Follow-up care is a key part of your treatment and safety. Be sure to make and go to all appointments, and call your doctor if you are having problems. It's also a good idea to know your test results and keep a list of the medicines you take. Where can you learn more? Go to https://Jasper Design AutomationpeAirbnb.Video Passports. org and sign in to your Liveyearbook account. Enter L235 in the [facility] box to learn more about \"Learning About the Low FODMAP Diet for Irritable Bowel Syndrome (IBS). \"     If you do not have an account, please click on the \"Sign Up Now\" link. Current as of: December 17, 2020               Content Version: 12.9  © 9844-9064 Healthwise, O4IT. Care instructions adapted under license by [facility] ([facility]). If you have questions about a medical condition or this instruction, always ask your healthcare professional. [place] [address] any warranty or liability for your use of this information. Patient Education        Learning About Diverticulosis and Diverticulitis  What are diverticulosis and diverticulitis?      In diverticulosis and diverticulitis, paulino called diverticula form in the wall of the large intestine, or colon. · In diverticulosis, the pouches do not cause any pain or other symptoms. · In diverticulitis, the pouches get inflamed or infected and cause symptoms. Doctors aren't sure what causes these pouches in the colon. But they think that a low-fiber diet may play a role. Without fiber to add bulk to the stool, the colon has to work harder than normal to push the stool forward. The pressure from this may cause pouches to form in weak spots along the colon. Some people with diverticulosis get diverticulitis. But experts don't know why this happens. What are the symptoms? · In diverticulosis, most people don't have symptoms. But pouches sometimes bleed. · In diverticulitis, symptoms may last from a few hours to a week or more. They include:  ? Belly pain. This is usually in the lower left side. It is sometimes worse when you move. This is the most common symptom. ? Fever and chills. ? Bloating and gas. ? Diarrhea or constipation. ? Nausea and sometimes vomiting.  ? Not feeling like eating. How can you prevent diverticulitis? You may be able to lower your chance of getting diverticulitis. You can do this by taking steps to prevent constipation. · Eat fruits, vegetables, beans, and whole grains every day. These foods are high in fiber. · Drink plenty of fluids. If you have kidney, heart, or liver disease and have to limit fluids, talk with your doctor before you increase the amount of fluids you drink. · Get at least 30 minutes of exercise on most days of the week. Walking is a good choice. You also may want to do other activities, such as running, swimming, cycling, or playing tennis or team sports. · Take a fiber supplement, such as Citrucel or Metamucil, every day if needed. Read and follow all instructions on the label. · Schedule time each day for a bowel movement. Having a daily routine may help.  Take your time and do not strain when having a bowel movement. Some people avoid nuts, seeds, berries, and popcorn. They believe that these foods might get trapped in the diverticula and cause pain. But there is no proof that these foods cause diverticulitis or make it worse. How are these problems treated? · The best way to treat diverticulosis is to avoid constipation. · Treatment for diverticulitis includes antibiotics. It often includes a change in your diet. You may need only liquids at first. Your doctor may suggest pain medicines for pain or belly cramps. In some cases, surgery may be needed. Follow-up care is a key part of your treatment and safety. Be sure to make and go to all appointments, and call your doctor if you are having problems. It's also a good idea to know your test results and keep a list of the medicines you take. Where can you learn more? Go to https://White Rabbit BrewingpeVyatta.Novetas Solutions. org and sign in to your [employer] account. Enter W113 in the hovelstay box to learn more about \"Learning About Diverticulosis and Diverticulitis. \"     If you do not have an account, please click on the \"Sign Up Now\" link. Current as of: February 10, 2021               Content Version: 12.9  © 2006-2021 Healthwise, Incorporated. Care instructions adapted under license by [facility] ([facility]). If you have questions about a medical condition or this instruction, always ask your healthcare professional. [place] [address] any warranty or liability for your use of this information.

## 2021-09-07 NOTE — ASSESSMENT & PLAN NOTE
Unclear if left lower quadrant pain is related to diverticulitis flare. Will treat empirically with Flagyl and Cipro. Patient advised on dietary modifications in AVS and will return to Dr. Patten Chol office for further evaluation. Advised patient to incorporate probiotics while taking strong antibiotics.

## 2021-09-07 NOTE — PROGRESS NOTES
.Depression screening done  Financial Resource Strain done  Chief Complaint   Patient presents with    Abdominal Pain    Back Pain     middle to lower

## 2021-09-07 NOTE — ASSESSMENT & PLAN NOTE
Asymptomatic, Advised patient via [employer]door[employer] that urology referral is indicated given the trace to small amount of blood found in urine since April 2019. ADDENDUM: patient sent msg back and is agreeable with seeing urology. Referral placed.      Results for orders placed or performed in visit on 09/07/21   POCT Urinalysis No Micro (Auto)   Result Value Ref Range    Color, UA yellow     Clarity, UA clear     Glucose, UA POC negative     Bilirubin, UA negative     Ketones, UA negative     Spec Grav, UA 1.025     Blood, UA POC trace-intact     pH, UA 5.5     Protein, UA POC negative     Urobilinogen, UA 0.2 e.u./dL     Leukocytes, UA negative     Nitrite, UA negative

## 2021-10-19 ENCOUNTER — OFFICE VISIT (OUTPATIENT)
Dept: FAMILY MEDICINE CLINIC | Age: 26
End: 2021-10-19
Payer: COMMERCIAL

## 2021-10-19 ENCOUNTER — TELEPHONE (OUTPATIENT)
Dept: FAMILY MEDICINE CLINIC | Age: 26
End: 2021-10-19

## 2021-10-19 VITALS
BODY MASS INDEX: 29.27 KG/M2 | DIASTOLIC BLOOD PRESSURE: 60 MMHG | TEMPERATURE: 97.2 F | SYSTOLIC BLOOD PRESSURE: 110 MMHG | WEIGHT: 235.4 LBS | HEIGHT: 75 IN

## 2021-10-19 DIAGNOSIS — R20.2 NUMBNESS AND TINGLING: ICD-10-CM

## 2021-10-19 DIAGNOSIS — R63.4 WEIGHT LOSS: ICD-10-CM

## 2021-10-19 DIAGNOSIS — R11.0 NAUSEA: ICD-10-CM

## 2021-10-19 DIAGNOSIS — R10.32 LEFT LOWER QUADRANT ABDOMINAL PAIN: Primary | ICD-10-CM

## 2021-10-19 DIAGNOSIS — Z13.1 DIABETES MELLITUS SCREENING: ICD-10-CM

## 2021-10-19 DIAGNOSIS — R63.0 ANOREXIA: ICD-10-CM

## 2021-10-19 DIAGNOSIS — Z13.220 LIPID SCREENING: ICD-10-CM

## 2021-10-19 DIAGNOSIS — R31.29 OTHER MICROSCOPIC HEMATURIA: ICD-10-CM

## 2021-10-19 DIAGNOSIS — K21.9 GASTROESOPHAGEAL REFLUX DISEASE WITHOUT ESOPHAGITIS: ICD-10-CM

## 2021-10-19 DIAGNOSIS — R10.9 LEFT SIDED ABDOMINAL PAIN: ICD-10-CM

## 2021-10-19 DIAGNOSIS — R20.0 NUMBNESS AND TINGLING: ICD-10-CM

## 2021-10-19 PROCEDURE — G8427 DOCREV CUR MEDS BY ELIG CLIN: HCPCS | Performed by: NURSE PRACTITIONER

## 2021-10-19 PROCEDURE — G8417 CALC BMI ABV UP PARAM F/U: HCPCS | Performed by: NURSE PRACTITIONER

## 2021-10-19 PROCEDURE — 1036F TOBACCO NON-USER: CPT | Performed by: NURSE PRACTITIONER

## 2021-10-19 PROCEDURE — 99214 OFFICE O/P EST MOD 30 MIN: CPT | Performed by: NURSE PRACTITIONER

## 2021-10-19 PROCEDURE — G8484 FLU IMMUNIZE NO ADMIN: HCPCS | Performed by: NURSE PRACTITIONER

## 2021-10-19 RX ORDER — SUCRALFATE ORAL 1 G/10ML
1 SUSPENSION ORAL 4 TIMES DAILY
Qty: 1200 ML | Refills: 3 | Status: SHIPPED | OUTPATIENT
Start: 2021-10-19 | End: 2022-10-19 | Stop reason: ALTCHOICE

## 2021-10-19 RX ORDER — OMEPRAZOLE 40 MG/1
40 CAPSULE, DELAYED RELEASE ORAL
Qty: 30 CAPSULE | Refills: 5 | Status: SHIPPED | OUTPATIENT
Start: 2021-10-19 | End: 2022-10-19 | Stop reason: ALTCHOICE

## 2021-10-19 ASSESSMENT — ENCOUNTER SYMPTOMS
ABDOMINAL DISTENTION: 1
ABDOMINAL PAIN: 1
RESPIRATORY NEGATIVE: 1
COUGH: 0
BOWEL INCONTINENCE: 0
VOMITING: 0
CONSTIPATION: 0
SHORTNESS OF BREATH: 0
BACK PAIN: 1
NAUSEA: 0
BLOOD IN STOOL: 1
DIARRHEA: 0

## 2021-10-19 NOTE — PROGRESS NOTES
[facility]  [facility]  [address]  [facility] [address]  Dept: [phone]    10/19/2021    CHIEF COMPLAINT    Chief Complaint   Patient presents with    Abdominal Pain     sharp pain on left side for 3 months       HPI    Leeanne Fernandez is a 27 y.o. male who presents   Chief Complaint   Patient presents with    Abdominal Pain     sharp pain on left side for 3 months   . Appointment to follow-up on abdominal pain. Abdominal pain-patient was placed on empiric course of Flagyl and Cipro at most recent appointment. Advised to return to Dr. Raymundo Sue office for further evaluation. TODAY- seeing GI in 1 month as it was the soonest available. He has gained 8 lbs, but notes that he is still miserable, frequent abdominal pain and inability to eat without appetite stimulant of marijuana. He tolerated the antibiotics without increased sx, but he did not notice any improvement in symptoms. Stool has been more normal consistency in the last week due to eating a bowl of life cereal nightly before bed. Continues to deny fevers,chills, nausea and blood. Taking omeprazole daily. Hematuria and urinary frequency-referral provided to urology based on persistent hematuria with out known cause. No appointment needed yet. Chronic back pain- unchanged. Continues stretching, taking OTC meds and utilizing heat/ice as needed. Patient does not want to pursue any further testing or treatments at this time. Continues to note occasional numbness and tingling in arms. Occurs bilaterally, but right side is worse than left. Abdominal Pain  This is a recurrent problem. The current episode started more than 1 month ago (pain woresened markedly in May). The problem occurs constantly. The pain is located in the LLQ, LUQ and epigastric region. The quality of the pain is aching and sharp. The abdominal pain does not radiate.  Associated symptoms include anorexia, frequency, hematuria ( Microscopic. See HPI) and weight loss. Pertinent negatives include no constipation, diarrhea, fever, headaches, nausea or vomiting. The pain is aggravated by movement, palpation and certain positions. The pain is relieved by nothing. Prior diagnostic workup includes GI consult, CT scan, upper endoscopy, lower endoscopy and surgery. His past medical history is significant for abdominal surgery, GERD and irritable bowel syndrome. Vitals:    10/19/21 0930   BP: 110/60   Temp: 97.2 °F (36.2 °C)   Weight: 235 lb 6.4 oz (106.8 kg)   Height: 6' 3\" (1.905 m)       Wt Readings from Last 3 Encounters:   10/19/21 235 lb 6.4 oz (106.8 kg)   09/07/21 227 lb (103 kg)   11/16/20 269 lb (122 kg)     BP Readings from Last 3 Encounters:   10/19/21 110/60   09/07/21 124/83   11/02/20 123/84       REVIEW OF SYSTEMS    Review of Systems   Constitutional: Positive for weight loss. Negative for chills, fatigue and fever. Eyes: Negative for visual disturbance. Respiratory: Negative. Negative for cough and shortness of breath. Cardiovascular: Negative. Negative for chest pain and palpitations. Gastrointestinal: Positive for abdominal distention, abdominal pain, anorexia and blood in stool (Reports this occurs once every 3 weeks or so). Negative for bowel incontinence, constipation, diarrhea, nausea and vomiting. Genitourinary: Positive for frequency and hematuria ( Microscopic. See HPI). Negative for bladder incontinence. Musculoskeletal: Positive for back pain (See HPI ). Neurological: Positive for tingling, weakness and paresthesias. Negative for dizziness and headaches.        PAST MEDICAL HISTORY    Past Medical History:   Diagnosis Date    Chronic back pain 2012    Diverticulosis     GERD (gastroesophageal reflux disease)     Hemoptysis 2020    History of intussusception     OR at [facility] Lung nodule 2020    LLL nodule    Undescended right testes 2011       FAMILY HISTORY Family History   Problem Relation Age of Onset    High Blood Pressure Mother     Heart Disease Mother     Heart Attack Mother 40    Cancer Father         unknown kind     Heart Attack Maternal Grandmother 61        COD     Other Sister         POTS     Heart Attack Maternal Grandfather 48    Prostate Cancer Maternal Grandfather     Stroke Maternal Grandfather         2000    No Known Problems Paternal Grandmother     No Known Problems Paternal Grandfather        SOCIAL HISTORY    Social History     Socioeconomic History    Marital status: Single     Spouse name: None    Number of children: None    Years of education: None    Highest education level: None   Occupational History    None   Tobacco Use    Smoking status: Former Smoker     Packs/day: 1.00     Years: 6.00     Pack years: 6.00     Types: Cigarettes     Quit date: 6/1/2018     Years since quitting: 3.3    Smokeless tobacco: Never Used   Vaping Use    Vaping Use: Every day    Substances: Always   Substance and Sexual Activity    Alcohol use: Yes     Comment: Occasionally, very rare    Drug use: No    Sexual activity: Yes     Partners: Female   Other Topics Concern    None   Social History Narrative    None     Social Determinants of Health     Financial Resource Strain: Low Risk     Difficulty of Paying Living Expenses: Not hard at all   Food Insecurity: No Food Insecurity    Worried About Running Out of Food in the Last Year: Never true    Ming of Food in the Last Year: Never true   Transportation Needs: No Transportation Needs    Lack of Transportation (Medical): No    Lack of Transportation (Non-Medical):  No   Physical Activity:     Days of Exercise per Week:     Minutes of Exercise per Session:    Stress:     Feeling of Stress :    Social Connections:     Frequency of Communication with Friends and Family:     Frequency of Social Gatherings with Friends and Family:     Attends [employer] Services:     Active Member of Clubs or Organizations:     Attends Club or Organization Meetings:     Marital Status:    Intimate Partner Violence:     Fear of Current or Ex-Partner:     Emotionally Abused:     Physically Abused:     Sexually Abused:        SURGICAL HISTORY    Past Surgical History:   Procedure Laterality Date    BRONCHOSCOPY N/A 10/08/2020    BRONCHOSCOPY performed by Will Pak MD at [address]      intussusception    COLONOSCOPY  09/22/2020    Dr. Jonelle Ragsdale with [facility]    TESTICLE SURGERY Right 2011    undecended testicle operation     TYMPANOSTOMY TUBE PLACEMENT  1995       CURRENT MEDICATIONS    Current Outpatient Medications   Medication Sig Dispense Refill    omeprazole (PRILOSEC) 40 MG delayed release capsule Take 1 capsule by mouth every morning (before breakfast) 30 capsule 5    sucralfate (CARAFATE) 1 GM/10ML suspension Take 10 mLs by mouth 4 times daily 1200 mL 3    albuterol sulfate  (90 Base) MCG/ACT inhaler inhale 2 puffs by mouth and INTO THE LUNGS every 4 hours if needed for wheezing      Handicap Placard MISC by Does not apply route Expires 5 years from original written date 1 each 0    EPINEPHrine (EPIPEN) 0.3 MG/0.3ML SOAJ injection Use as directed for allergic reaction (Patient not taking: Reported on 10/19/2021) 2 each 2    ondansetron (ZOFRAN) 4 MG tablet Take 1 tablet by mouth daily as needed for Nausea or Vomiting (Patient not taking: Reported on 10/19/2021) 30 tablet 5     No current facility-administered medications for this visit. ALLERGIES    Allergies   Allergen Reactions    Onion Anaphylaxis and Hives    Rondec-D  [Chlophedianol-Pseudoephedrine] Other (See Comments)    Rondec Hives and Nausea And Vomiting       PHYSICAL EXAM   Physical Exam  Vitals and nursing note reviewed. Constitutional:       General: He is not in acute distress. Appearance: He is well-developed, well-groomed and overweight. He is not diaphoretic. Interventions: Face mask in place. HENT:      Head: Normocephalic. Right Ear: Hearing normal.      Left Ear: Hearing normal.   Eyes:      General:         Right eye: No discharge. Left eye: No discharge. Pupils: Pupils are equal.   Cardiovascular:      Rate and Rhythm: Normal rate and regular rhythm. Pulses: Normal pulses. Radial pulses are 2+ on the right side and 2+ on the left side. Heart sounds: Normal heart sounds, S1 normal and S2 normal. No murmur heard. Pulmonary:      Effort: Pulmonary effort is normal. No respiratory distress. Breath sounds: Normal breath sounds. No wheezing. Musculoskeletal:      Cervical back: Normal range of motion. Skin:     General: Skin is warm and dry. Neurological:      Mental Status: He is alert and oriented to person, place, and time. Psychiatric:         Behavior: Behavior normal. Behavior is cooperative. ASSESSMENT/PLAN  1. Left lower quadrant abdominal pain  Assessment & Plan:   Uncontrolled, changes made today: We will add Carafate 4 times daily. Continue omeprazole, eating a bland diet and return to Dr. Kathy Chambers office ASAP. Our office will call and see if there is any other provider that can get him in sooner. Orders:  -     sucralfate (CARAFATE) 1 GM/10ML suspension; Take 10 mLs by mouth 4 times daily, Disp-1200 mL, R-3Normal  -     CBC Auto Differential; Future  -     Comprehensive Metabolic Panel; Future  -     Magnesium; Future  2. Left sided abdominal pain  Assessment & Plan:   Uncontrolled, changes made today: We will add Carafate 4 times daily. Continue omeprazole, eating a bland diet and return to Dr. Kathy Chambers office ASAP. Our office will call and see if there is any other provider that can get him in sooner. Orders:  -     CBC Auto Differential; Future  -     Comprehensive Metabolic Panel; Future  -     Lipase; Future  3.  Weight loss  Assessment & Plan:   Patient has gained weight since last appointment. We will continue to monitor weight and check labs as ordered for metabolic cause. Orders:  -     CBC Auto Differential; Future  -     Comprehensive Metabolic Panel; Future  -     T4, Free; Future  -     TSH without Reflex; Future  -     Magnesium; Future  4. Nausea  Assessment & Plan:   Uncontrolled, continue current medications, eating bland diet as able and notifying office of worsening symptoms. Will consult with GI in 1 month or sooner if able. Orders:  -     T4, Free; Future  -     TSH without Reflex; Future  5. Anorexia  Assessment & Plan:   Uncontrolled, continue current plan pending work up below and follow-up with GI  Orders:  -     T4, Free; Future  -     TSH without Reflex; Future  6. Gastroesophageal reflux disease without esophagitis  Assessment & Plan:   Well-controlled, continue current medications  Orders:  -     omeprazole (PRILOSEC) 40 MG delayed release capsule; Take 1 capsule by mouth every morning (before breakfast), Disp-30 capsule, R-5Normal  7. Numbness and tingling  Assessment & Plan:   Uncontrolled, Continue current plan pending work-up below. Orders:  -     Iron; Future  -     Ferritin; Future  8. Lipid screening  -     Lipid Panel; Future  9. Diabetes mellitus screening  -     Hemoglobin A1C; Future  10. Other microscopic hematuria  Assessment & Plan:   Asymptomatic, continue current treatment plan to see urology for further evaluation. Discussed with patient that given his persistent hematuria a scope will likely be necessary. Patient voices understanding      Josh Pineda received counseling on the following healthy behaviors: nutrition, exercise and medication adherence  Reviewed prior labs and health maintenance  Continue current medications, diet and exercise. Discussed use, benefit, and side effects of prescribed medications. Barriers to medication compliance addressed.    Patient given educational materials - see patient instructions  Was a self-tracking handout given in paper form or via [employer]? Yes    Requested Prescriptions     Signed Prescriptions Disp Refills    omeprazole (PRILOSEC) 40 MG delayed release capsule 30 capsule 5     Sig: Take 1 capsule by mouth every morning (before breakfast)    sucralfate (CARAFATE) 1 GM/10ML suspension 1200 mL 3     Sig: Take 10 mLs by mouth 4 times daily       All patient questions answered. Patient voiced understanding. Quality Measures    Body mass index is 29.42 kg/m². Elevated. Weight control planned discussed Healthy diet and regular exercise. BP: 110/60 Blood pressure is normal. Treatment plan consists of No treatment change needed. No results found for: LDLCALC, LDLCHOLESTEROL, LDLDIRECT (goal LDL reduction with dx if diabetes is 50% LDL reduction)      PHQ Scores 9/7/2021 12/17/2020 1/7/2020 3/26/2019   PHQ2 Score 0 0 0 0   PHQ9 Score 0 0 0 0     Interpretation of Total Score Depression Severity: 1-4 = Minimal depression, 5-9 = Mild depression, 10-14 = Moderate depression, 15-19 = Moderately severe depression, 20-27 = Severe depression     Return in about 2 months (around 12/19/2021) for abd pain.     (Please note that portions of this note were completed with a voice recognition program. Efforts were made to edit the dictations but occasionally words are mis-transcribed.)      Electronically signed by ANDREA Gallardo CNP on 10/19/21 at 9:40 AM RENET

## 2021-10-20 NOTE — ASSESSMENT & PLAN NOTE
Asymptomatic, continue current treatment plan to see urology for further evaluation. Discussed with patient that given his persistent hematuria a scope will likely be necessary.  Patient voices understanding

## 2021-10-20 NOTE — TELEPHONE ENCOUNTER
Can we please call GI with Dr. Jade Mari office and confirm that no other providers can see Javier Cerda sooner? He is having significant abdominal pain for the last 3 to 4 months. When he called to schedule after his appointment with us in September he was scheduled for November.

## 2021-10-20 NOTE — ASSESSMENT & PLAN NOTE
Uncontrolled, changes made today: We will add Carafate 4 times daily. Continue omeprazole, eating a bland diet and return to Dr. Cadence Kelly office ASAP. Our office will call and see if there is any other provider that can get him in sooner.

## 2021-10-20 NOTE — ASSESSMENT & PLAN NOTE
Patient has gained weight since last appointment. We will continue to monitor weight and check labs as ordered for metabolic cause.

## 2021-10-21 NOTE — TELEPHONE ENCOUNTER
Can see Fran Purdy on 11/02/2021 at 1:30 pt needs to call Dr. Arevalo Expose office right away.  I did leave a voice message for him to call the office to secure the appointment

## 2021-10-21 NOTE — TELEPHONE ENCOUNTER
Review of records shows that patient was in the ER with his abdominal pain when initial phone call was made. Spoke to patient's wife Elizabeth De La Rosa.   Informed her of the appointment and to call GI ASAP

## 2022-10-19 ENCOUNTER — OFFICE VISIT (OUTPATIENT)
Dept: FAMILY MEDICINE CLINIC | Age: 27
End: 2022-10-19
Payer: COMMERCIAL

## 2022-10-19 VITALS
RESPIRATION RATE: 17 BRPM | HEART RATE: 90 BPM | DIASTOLIC BLOOD PRESSURE: 84 MMHG | WEIGHT: 220.8 LBS | HEIGHT: 75 IN | BODY MASS INDEX: 27.45 KG/M2 | TEMPERATURE: 98.4 F | SYSTOLIC BLOOD PRESSURE: 132 MMHG | OXYGEN SATURATION: 99 %

## 2022-10-19 DIAGNOSIS — F34.1 DYSTHYMIA: ICD-10-CM

## 2022-10-19 DIAGNOSIS — K21.9 GASTROESOPHAGEAL REFLUX DISEASE WITHOUT ESOPHAGITIS: ICD-10-CM

## 2022-10-19 DIAGNOSIS — R11.2 NAUSEA AND VOMITING, UNSPECIFIED VOMITING TYPE: ICD-10-CM

## 2022-10-19 DIAGNOSIS — F41.9 ANXIETY: Primary | ICD-10-CM

## 2022-10-19 PROCEDURE — 99213 OFFICE O/P EST LOW 20 MIN: CPT | Performed by: NURSE PRACTITIONER

## 2022-10-19 PROCEDURE — 1036F TOBACCO NON-USER: CPT | Performed by: NURSE PRACTITIONER

## 2022-10-19 PROCEDURE — G8417 CALC BMI ABV UP PARAM F/U: HCPCS | Performed by: NURSE PRACTITIONER

## 2022-10-19 PROCEDURE — G8427 DOCREV CUR MEDS BY ELIG CLIN: HCPCS | Performed by: NURSE PRACTITIONER

## 2022-10-19 PROCEDURE — G8484 FLU IMMUNIZE NO ADMIN: HCPCS | Performed by: NURSE PRACTITIONER

## 2022-10-19 RX ORDER — OMEPRAZOLE 40 MG/1
40 CAPSULE, DELAYED RELEASE ORAL
Qty: 30 CAPSULE | Refills: 5 | Status: SHIPPED | OUTPATIENT
Start: 2022-10-19 | End: 2022-10-21 | Stop reason: SDUPTHER

## 2022-10-19 RX ORDER — ONDANSETRON 4 MG/1
4 TABLET, FILM COATED ORAL DAILY PRN
Qty: 30 TABLET | Refills: 5 | Status: SHIPPED | OUTPATIENT
Start: 2022-10-19 | End: 2022-10-21 | Stop reason: SDUPTHER

## 2022-10-19 RX ORDER — BUPROPION HYDROCHLORIDE 150 MG/1
150 TABLET ORAL EVERY MORNING
Qty: 30 TABLET | Refills: 2 | Status: SHIPPED | OUTPATIENT
Start: 2022-10-19 | End: 2022-10-21 | Stop reason: SDUPTHER

## 2022-10-19 RX ORDER — LORAZEPAM 0.5 MG/1
0.5 TABLET ORAL EVERY 8 HOURS PRN
Qty: 30 TABLET | Refills: 0 | Status: SHIPPED | OUTPATIENT
Start: 2022-10-19 | End: 2022-10-21 | Stop reason: SDUPTHER

## 2022-10-19 SDOH — ECONOMIC STABILITY: FOOD INSECURITY: WITHIN THE PAST 12 MONTHS, YOU WORRIED THAT YOUR FOOD WOULD RUN OUT BEFORE YOU GOT MONEY TO BUY MORE.: NEVER TRUE

## 2022-10-19 SDOH — ECONOMIC STABILITY: FOOD INSECURITY: WITHIN THE PAST 12 MONTHS, THE FOOD YOU BOUGHT JUST DIDN'T LAST AND YOU DIDN'T HAVE MONEY TO GET MORE.: NEVER TRUE

## 2022-10-19 ASSESSMENT — PATIENT HEALTH QUESTIONNAIRE - PHQ9
1. LITTLE INTEREST OR PLEASURE IN DOING THINGS: 0
9. THOUGHTS THAT YOU WOULD BE BETTER OFF DEAD, OR OF HURTING YOURSELF: 0
6. FEELING BAD ABOUT YOURSELF - OR THAT YOU ARE A FAILURE OR HAVE LET YOURSELF OR YOUR FAMILY DOWN: 0
SUM OF ALL RESPONSES TO PHQ9 QUESTIONS 1 & 2: 1
SUM OF ALL RESPONSES TO PHQ QUESTIONS 1-9: 5
SUM OF ALL RESPONSES TO PHQ QUESTIONS 1-9: 5
5. POOR APPETITE OR OVEREATING: 2
SUM OF ALL RESPONSES TO PHQ QUESTIONS 1-9: 5
7. TROUBLE CONCENTRATING ON THINGS, SUCH AS READING THE NEWSPAPER OR WATCHING TELEVISION: 1
SUM OF ALL RESPONSES TO PHQ QUESTIONS 1-9: 5
8. MOVING OR SPEAKING SO SLOWLY THAT OTHER PEOPLE COULD HAVE NOTICED. OR THE OPPOSITE, BEING SO FIGETY OR RESTLESS THAT YOU HAVE BEEN MOVING AROUND A LOT MORE THAN USUAL: 0
2. FEELING DOWN, DEPRESSED OR HOPELESS: 1
4. FEELING TIRED OR HAVING LITTLE ENERGY: 1
10. IF YOU CHECKED OFF ANY PROBLEMS, HOW DIFFICULT HAVE THESE PROBLEMS MADE IT FOR YOU TO DO YOUR WORK, TAKE CARE OF THINGS AT HOME, OR GET ALONG WITH OTHER PEOPLE: 0
3. TROUBLE FALLING OR STAYING ASLEEP: 0

## 2022-10-19 ASSESSMENT — SOCIAL DETERMINANTS OF HEALTH (SDOH): HOW HARD IS IT FOR YOU TO PAY FOR THE VERY BASICS LIKE FOOD, HOUSING, MEDICAL CARE, AND HEATING?: NOT HARD AT ALL

## 2022-10-19 ASSESSMENT — ENCOUNTER SYMPTOMS
EYES NEGATIVE: 1
BACK PAIN: 1
NAUSEA: 1
ABDOMINAL PAIN: 1

## 2022-10-19 NOTE — PROGRESS NOTES
[address]   [facility] [address] [facility] [address]  Dept: [phone]    10/19/2022    CHIEF COMPLAINT    Chief Complaint   Patient presents with    Neck Pain     5 years    Back Pain     5 years       HPI    Candace Oshea is a 32 y.o. male who presents   Chief Complaint   Patient presents with    Neck Pain     5 years    Back Pain     5 years   . Appointment to f/u on chronic pain. Notable increase in anxiety. Currently working at [employer] full-time    Anxiety/depression-increased anxiety lately due to situations at work. Patient will be transferring soon, but would like to resume previously taken Wellbutrin. He is taking Ativan very sparingly since it was prescribed and is requesting a refill at this time. Neck/Back pain- notable increased pain on weekends. Pain is chronic and stable otherwise. Patient not interested in any change in therapy or plan of care for back pain. Vitals:    10/19/22 1349   BP: 132/84   Site: Left Upper Arm   Position: Sitting   Cuff Size: Large Adult   Pulse: 90   Resp: 17   Temp: 98.4 °F (36.9 °C)   TempSrc: Temporal   SpO2: 99%   Weight: 220 lb 12.8 oz (100.2 kg)   Height: 6' 3\" (1.905 m)       Wt Readings from Last 3 Encounters:   10/19/22 220 lb 12.8 oz (100.2 kg)   10/19/21 235 lb 6.4 oz (106.8 kg)   09/07/21 227 lb (103 kg)     BP Readings from Last 3 Encounters:   10/19/22 132/84   10/19/21 110/60   09/07/21 124/83       REVIEW OF SYSTEMS    Review of Systems   Constitutional: Negative. Negative for chills, fatigue and fever. Eyes: Negative. Negative for visual disturbance. Respiratory:  Positive for shortness of breath (with anxiety). Cardiovascular:  Positive for palpitations (with anxiety). Negative for chest pain. Gastrointestinal:  Positive for abdominal pain and nausea. Musculoskeletal:  Positive for back pain and neck pain. Neurological: Negative. Negative for dizziness and headaches. Psychiatric/Behavioral:  Positive for confusion, dysphoric mood (mild) and sleep disturbance. The patient is nervous/anxious.       PAST MEDICAL HISTORY    Past Medical History:   Diagnosis Date    Chronic back pain     Diverticulosis     GERD (gastroesophageal reflux disease)     Hemoptysis     History of intussusception     OR at [facility]    Lung nodule     LLL nodule    Undescended right testes        FAMILY HISTORY    Family History   Problem Relation Age of Onset    High Blood Pressure Mother     Heart Disease Mother     Heart Attack Mother 40    Cancer Father         unknown kind     Heart Attack Maternal Grandmother 61        COD     Other Sister         POTS     Heart Attack Maternal Grandfather 48    Prostate Cancer Maternal Grandfather     Stroke Maternal Grandfather             No Known Problems Paternal Grandmother     No Known Problems Paternal Grandfather        SOCIAL HISTORY    Social History     Socioeconomic History    Marital status: Single     Spouse name: None    Number of children: None    Years of education: None    Highest education level: None   Tobacco Use    Smoking status: Former     Packs/day: 1.00     Years: 6.00     Pack years: 6.00     Types: Cigarettes     Quit date: 2018     Years since quittin.3    Smokeless tobacco: Never   Vaping Use    Vaping Use: Every day    Substances: Always   Substance and Sexual Activity    Alcohol use: Yes     Comment: Occasionally, very rare    Drug use: No    Sexual activity: Yes     Partners: Female     Social Determinants of Health     Financial Resource Strain: Low Risk     Difficulty of Paying Living Expenses: Not hard at all   Food Insecurity: No Food Insecurity    Worried About Running Out of Food in the Last Year: Never true    Ran Out of Food in the Last Year: Never true   Transportation Needs: No Transportation Needs    Lack of Transportation (Medical): No    Lack of Transportation (Non-Medical): No       SURGICAL HISTORY    Past Surgical History:   Procedure Laterality Date    BRONCHOSCOPY N/A 10/08/2020    BRONCHOSCOPY performed by Selina Chan MD at [facility]    COLON SURGERY      intussusception    COLONOSCOPY  09/22/2020    Dr. Jazz Loya with [facility]    TESTICLE SURGERY Right 2011    undecended testicle operation     TYMPANOSTOMY TUBE PLACEMENT  1995       CURRENT MEDICATIONS    Current Outpatient Medications   Medication Sig Dispense Refill    omeprazole (PRILOSEC) 40 MG delayed release capsule Take 1 capsule by mouth every morning (before breakfast) 30 capsule 5    ondansetron (ZOFRAN) 4 MG tablet Take 1 tablet by mouth daily as needed for Nausea or Vomiting 30 tablet 5    buPROPion (WELLBUTRIN XL) 150 MG extended release tablet Take 1 tablet by mouth every morning 30 tablet 2    LORazepam (ATIVAN) 0.5 MG tablet Take 1 tablet by mouth every 8 hours as needed for Anxiety for up to 30 days. 30 tablet 0     No current facility-administered medications for this visit. ALLERGIES    Allergies   Allergen Reactions    Onion Anaphylaxis and Hives    Rondec-D  [Chlophedianol-Pseudoephedrine] Other (See Comments)    Rondec Hives and Nausea And Vomiting       PHYSICAL EXAM   Physical Exam  Vitals and nursing note reviewed. Constitutional:       General: He is not in acute distress. Appearance: He is well-developed, well-groomed and overweight. He is not diaphoretic. HENT:      Head: Normocephalic. Right Ear: Hearing normal.      Left Ear: Hearing normal.   Eyes:      General:         Right eye: No discharge. Left eye: No discharge. Pupils: Pupils are equal.   Cardiovascular:      Rate and Rhythm: Normal rate and regular rhythm. Pulses: Normal pulses. Radial pulses are 2+ on the right side and 2+ on the left side. Heart sounds: Normal heart sounds, S1 normal and S2 normal. No murmur heard.   Pulmonary:      Effort: Pulmonary effort is normal. No respiratory distress. Breath sounds: Normal breath sounds. No wheezing. Musculoskeletal:      Cervical back: Normal range of motion. Skin:     General: Skin is warm and dry. Neurological:      Mental Status: He is alert and oriented to person, place, and time. Psychiatric:         Behavior: Behavior normal. Behavior is cooperative. ASSESSMENT/PLAN  1. Gastroesophageal reflux disease without esophagitis  -     omeprazole (PRILOSEC) 40 MG delayed release capsule; Take 1 capsule by mouth every morning (before breakfast), Disp-30 capsule, R-5Normal  2. Nausea and vomiting  -     ondansetron (ZOFRAN) 4 MG tablet; Take 1 tablet by mouth daily as needed for Nausea or Vomiting, Disp-30 tablet, R-5Normal  3. Anxiety  -     buPROPion (WELLBUTRIN XL) 150 MG extended release tablet; Take 1 tablet by mouth every morning, Disp-30 tablet, R-2Normal  -     LORazepam (ATIVAN) 0.5 MG tablet; Take 1 tablet by mouth every 8 hours as needed for Anxiety for up to 30 days. , Disp-30 tablet, R-0Normal  4. Dysthymia  -     buPROPion (WELLBUTRIN XL) 150 MG extended release tablet; Take 1 tablet by mouth every morning, Disp-30 tablet, R-2Normal    Acute increase in anxiety and depression. Will resume Wellbutrin at previous dose of 150 mg. Continue Ativan sparingly as needed. Reviewed potential side effects as Wellbutrin has not been taken in a couple of years. GERD borderline stable. Continue omeprazole and Zofran as needed. Teresita Cee received counseling on the following healthy behaviors: nutrition, exercise, and medication adherence  Reviewed prior labs and health maintenance  Continue current medications, diet and exercise. Discussed use, benefit, and side effects of prescribed medications. Barriers to medication compliance addressed. Patient given educational materials - see patient instructions  Was a self-tracking handout given in paper form or via [employer]?  Yes    Requested Prescriptions      No prescriptions requested or ordered in this encounter       All patient questions answered. Patient voiced understanding. Quality Measures    Body mass index is 27.6 kg/m². Elevated. Weight control planned discussed Healthy diet and regular exercise. BP: 132/84 Blood pressure is Normal. Treatment plan consists of No treatment change needed. No results found for: LDLCALC, LDLCHOLESTEROL, LDLDIRECT (goal LDL reduction with dx if diabetes is 50% LDL reduction)      PHQ Scores 10/19/2022 9/7/2021 12/17/2020 1/7/2020 3/26/2019   PHQ2 Score 1 0 0 0 0   PHQ9 Score 5 0 0 0 0     Interpretation of Total Score Depression Severity: 1-4 = Minimal depression, 5-9 = Mild depression, 10-14 = Moderate depression, 15-19 = Moderately severe depression, 20-27 = Severe depression     Return in about 6 weeks (around 11/30/2022) for Anxiety.     (Please note that portions of this note were completed with a voice recognition program. Efforts were made to edit the dictations but occasionally words are mis-transcribed.)      Electronically signed by ANDREA Bennett CNP on 10/19/22 at 2:22 PM EDT

## 2022-10-20 DIAGNOSIS — R11.2 NAUSEA AND VOMITING: ICD-10-CM

## 2022-10-20 DIAGNOSIS — F41.9 ANXIETY: ICD-10-CM

## 2022-10-20 DIAGNOSIS — K21.9 GASTROESOPHAGEAL REFLUX DISEASE WITHOUT ESOPHAGITIS: ICD-10-CM

## 2022-10-20 DIAGNOSIS — F34.1 DYSTHYMIA: ICD-10-CM

## 2022-10-21 RX ORDER — BUPROPION HYDROCHLORIDE 150 MG/1
150 TABLET ORAL EVERY MORNING
Qty: 30 TABLET | Refills: 2 | Status: SHIPPED | OUTPATIENT
Start: 2022-10-21

## 2022-10-21 RX ORDER — LORAZEPAM 0.5 MG/1
0.5 TABLET ORAL EVERY 8 HOURS PRN
Qty: 30 TABLET | Refills: 0 | Status: SHIPPED | OUTPATIENT
Start: 2022-10-21 | End: 2022-11-20

## 2022-10-21 RX ORDER — OMEPRAZOLE 40 MG/1
40 CAPSULE, DELAYED RELEASE ORAL
Qty: 30 CAPSULE | Refills: 5 | Status: SHIPPED | OUTPATIENT
Start: 2022-10-21

## 2022-10-21 RX ORDER — ONDANSETRON 4 MG/1
4 TABLET, FILM COATED ORAL DAILY PRN
Qty: 30 TABLET | Refills: 5 | Status: SHIPPED | OUTPATIENT
Start: 2022-10-21

## 2022-10-28 ASSESSMENT — ENCOUNTER SYMPTOMS: SHORTNESS OF BREATH: 1

## 2022-11-30 ENCOUNTER — OFFICE VISIT (OUTPATIENT)
Dept: FAMILY MEDICINE CLINIC | Age: 27
End: 2022-11-30
Payer: COMMERCIAL

## 2022-11-30 VITALS
RESPIRATION RATE: 17 BRPM | HEART RATE: 79 BPM | TEMPERATURE: 99 F | SYSTOLIC BLOOD PRESSURE: 111 MMHG | DIASTOLIC BLOOD PRESSURE: 74 MMHG | OXYGEN SATURATION: 97 % | HEIGHT: 75 IN | WEIGHT: 226.13 LBS | BODY MASS INDEX: 28.12 KG/M2

## 2022-11-30 DIAGNOSIS — G89.29 CHRONIC BILATERAL LOW BACK PAIN WITH BILATERAL SCIATICA: ICD-10-CM

## 2022-11-30 DIAGNOSIS — G89.29 CHRONIC NECK PAIN: ICD-10-CM

## 2022-11-30 DIAGNOSIS — M54.42 CHRONIC BILATERAL LOW BACK PAIN WITH BILATERAL SCIATICA: ICD-10-CM

## 2022-11-30 DIAGNOSIS — R20.2 BILATERAL NUMBNESS AND TINGLING OF ARMS AND LEGS: ICD-10-CM

## 2022-11-30 DIAGNOSIS — M54.2 CHRONIC NECK PAIN: ICD-10-CM

## 2022-11-30 DIAGNOSIS — M47.896 OTHER SPONDYLOSIS, LUMBAR REGION: ICD-10-CM

## 2022-11-30 DIAGNOSIS — R20.0 BILATERAL NUMBNESS AND TINGLING OF ARMS AND LEGS: ICD-10-CM

## 2022-11-30 DIAGNOSIS — M54.41 CHRONIC BILATERAL LOW BACK PAIN WITH BILATERAL SCIATICA: ICD-10-CM

## 2022-11-30 DIAGNOSIS — F41.9 ANXIETY: Primary | ICD-10-CM

## 2022-11-30 DIAGNOSIS — F34.1 DYSTHYMIA: ICD-10-CM

## 2022-11-30 PROBLEM — M51.36 DDD (DEGENERATIVE DISC DISEASE), LUMBAR: Status: ACTIVE | Noted: 2021-01-28

## 2022-11-30 PROBLEM — Q76.49 SPINAL ASYMMETRY (< 10 DEGREES): Status: ACTIVE | Noted: 2021-01-28

## 2022-11-30 PROBLEM — M51.369 DDD (DEGENERATIVE DISC DISEASE), LUMBAR: Status: ACTIVE | Noted: 2021-01-28

## 2022-11-30 PROCEDURE — G8427 DOCREV CUR MEDS BY ELIG CLIN: HCPCS | Performed by: NURSE PRACTITIONER

## 2022-11-30 PROCEDURE — G8417 CALC BMI ABV UP PARAM F/U: HCPCS | Performed by: NURSE PRACTITIONER

## 2022-11-30 PROCEDURE — 99213 OFFICE O/P EST LOW 20 MIN: CPT | Performed by: NURSE PRACTITIONER

## 2022-11-30 PROCEDURE — 1036F TOBACCO NON-USER: CPT | Performed by: NURSE PRACTITIONER

## 2022-11-30 PROCEDURE — G8484 FLU IMMUNIZE NO ADMIN: HCPCS | Performed by: NURSE PRACTITIONER

## 2022-11-30 RX ORDER — BUPROPION HYDROCHLORIDE 300 MG/1
300 TABLET ORAL EVERY MORNING
Qty: 30 TABLET | Refills: 2 | Status: SHIPPED | OUTPATIENT
Start: 2022-11-30

## 2022-11-30 ASSESSMENT — ANXIETY QUESTIONNAIRES
6. BECOMING EASILY ANNOYED OR IRRITABLE: 1
IF YOU CHECKED OFF ANY PROBLEMS ON THIS QUESTIONNAIRE, HOW DIFFICULT HAVE THESE PROBLEMS MADE IT FOR YOU TO DO YOUR WORK, TAKE CARE OF THINGS AT HOME, OR GET ALONG WITH OTHER PEOPLE: VERY DIFFICULT
GAD7 TOTAL SCORE: 13
3. WORRYING TOO MUCH ABOUT DIFFERENT THINGS: 2
4. TROUBLE RELAXING: 3
5. BEING SO RESTLESS THAT IT IS HARD TO SIT STILL: 2
2. NOT BEING ABLE TO STOP OR CONTROL WORRYING: 1
7. FEELING AFRAID AS IF SOMETHING AWFUL MIGHT HAPPEN: 2
1. FEELING NERVOUS, ANXIOUS, OR ON EDGE: 2

## 2022-11-30 ASSESSMENT — PATIENT HEALTH QUESTIONNAIRE - PHQ9
6. FEELING BAD ABOUT YOURSELF - OR THAT YOU ARE A FAILURE OR HAVE LET YOURSELF OR YOUR FAMILY DOWN: 2
SUM OF ALL RESPONSES TO PHQ QUESTIONS 1-9: 19
1. LITTLE INTEREST OR PLEASURE IN DOING THINGS: 3
5. POOR APPETITE OR OVEREATING: 3
7. TROUBLE CONCENTRATING ON THINGS, SUCH AS READING THE NEWSPAPER OR WATCHING TELEVISION: 3
SUM OF ALL RESPONSES TO PHQ QUESTIONS 1-9: 19
4. FEELING TIRED OR HAVING LITTLE ENERGY: 2
SUM OF ALL RESPONSES TO PHQ QUESTIONS 1-9: 19
10. IF YOU CHECKED OFF ANY PROBLEMS, HOW DIFFICULT HAVE THESE PROBLEMS MADE IT FOR YOU TO DO YOUR WORK, TAKE CARE OF THINGS AT HOME, OR GET ALONG WITH OTHER PEOPLE: 2
SUM OF ALL RESPONSES TO PHQ QUESTIONS 1-9: 19
3. TROUBLE FALLING OR STAYING ASLEEP: 2
SUM OF ALL RESPONSES TO PHQ9 QUESTIONS 1 & 2: 5
8. MOVING OR SPEAKING SO SLOWLY THAT OTHER PEOPLE COULD HAVE NOTICED. OR THE OPPOSITE, BEING SO FIGETY OR RESTLESS THAT YOU HAVE BEEN MOVING AROUND A LOT MORE THAN USUAL: 2
9. THOUGHTS THAT YOU WOULD BE BETTER OFF DEAD, OR OF HURTING YOURSELF: 0
2. FEELING DOWN, DEPRESSED OR HOPELESS: 2

## 2022-11-30 ASSESSMENT — ENCOUNTER SYMPTOMS
BACK PAIN: 1
SHORTNESS OF BREATH: 1
NAUSEA: 1
EYES NEGATIVE: 1
ABDOMINAL PAIN: 1

## 2022-11-30 NOTE — PATIENT INSTRUCTIONS
New Updates for [facility]    Thank you for choosing [facility] to give you the best care! [facility] is always trying to think of new ways to help their patients. We are asking all patients to try out the new digital registration that is now available through the [employer] [address]. Down load today! . Via the jimmie you're now able to update your personal and registration information prior to your upcoming appointment. This will save you time once you arrive at the office to check-in, not to mention your information remains safe!! Many other perks come from signing up for an account, such as:  Requesting refills  Scheduling an appointment  Completing an E-Visit  Sending a message to the office/provider  Having access to your medication list  Paying your bill/copay prior to your appointment  Scheduling your yearly mammogram  Review your test results    If you are not familiar the [facility], please ask one of us and we will be happy to answer any questions or help you set-up your account.

## 2022-11-30 NOTE — PROGRESS NOTES
[address] [place] [address] [facility] [address]  Dept: [phone]    11/30/2022    CHIEF COMPLAINT    Chief Complaint   Patient presents with    Anxiety       HPI    Lily Ornelas is a 32 y.o. male who presents   Chief Complaint   Patient presents with    Anxiety     HPI    Appointment to follow-up on anxiety. Anxiety-started Wellbutrin 150 mg at appointment in October. Continues taking Ativan very sparingly. Plans to use transfer at work soon after our appointment, but this did not go as hoped and he ended up quitting due to high anxiety prior to going work. Feels medication is inconsistent with working some days and not others. Taking Ativan more lately due to higher anxiety at previous job and now with the job search. Taking it mostly at night to help him relax and sleep. Chronic neck, thoracic and lumbar spine pain- long term bilateral sciatic pain in legs, but new onset numbness and tingling in bilateral arm. Sciatica and upper extremity numbness and tingling is equal.  Increased pain in right hip and right knee. Requesting referral to neurology as previously discussed sometime ago. Vitals:    11/30/22 0943   BP: 111/74   Site: Left Upper Arm   Position: Sitting   Cuff Size: Large Adult   Pulse: 79   Resp: 17   Temp: 99 °F (37.2 °C)   TempSrc: Temporal   SpO2: 97%   Weight: 226 lb 2 oz (102.6 kg)   Height: 6' 3\" (1.905 m)       Wt Readings from Last 3 Encounters:   11/30/22 226 lb 2 oz (102.6 kg)   10/19/22 220 lb 12.8 oz (100.2 kg)   10/19/21 235 lb 6.4 oz (106.8 kg)     BP Readings from Last 3 Encounters:   11/30/22 111/74   10/19/22 132/84   10/19/21 110/60       REVIEW OF SYSTEMS    Review of Systems   Constitutional: Negative. Negative for chills, fatigue and fever. Eyes: Negative. Negative for visual disturbance. Respiratory:  Positive for shortness of breath (with anxiety).     Cardiovascular:  Positive for palpitations (with anxiety). Negative for chest pain. Gastrointestinal:  Positive for abdominal pain and nausea. Musculoskeletal:  Positive for back pain and neck pain. Neurological: Negative. Negative for dizziness and headaches. Psychiatric/Behavioral:  Positive for confusion, dysphoric mood (mild) and sleep disturbance. The patient is nervous/anxious.       PAST MEDICAL HISTORY    Past Medical History:   Diagnosis Date    Chronic back pain     Diverticulosis     GERD (gastroesophageal reflux disease)     Hemoptysis     History of intussusception     OR at [facility]    Lung nodule     LLL nodule    Undescended right testes        FAMILY HISTORY    Family History   Problem Relation Age of Onset    High Blood Pressure Mother     Heart Disease Mother     Heart Attack Mother 40    Cancer Father         unknown kind     Heart Attack Maternal Grandmother 61        COD     Other Sister         POTS     Heart Attack Maternal Grandfather 48    Prostate Cancer Maternal Grandfather     Stroke Maternal Grandfather         2000    No Known Problems Paternal Grandmother     No Known Problems Paternal Grandfather        SOCIAL HISTORY    Social History     Socioeconomic History    Marital status: Single     Spouse name: None    Number of children: None    Years of education: None    Highest education level: None   Tobacco Use    Smoking status: Former     Packs/day: 1.00     Years: 6.00     Pack years: 6.00     Types: Cigarettes     Quit date: 2018     Years since quittin.5    Smokeless tobacco: Never   Vaping Use    Vaping Use: Every day    Substances: Always   Substance and Sexual Activity    Alcohol use: Yes     Comment: Occasionally, very rare    Drug use: No    Sexual activity: Yes     Partners: Female     Social Determinants of Health     Financial Resource Strain: Low Risk     Difficulty of Paying Living Expenses: Not hard at all   Food Insecurity: No Food Insecurity    Worried About Running Out of Food in the Last Year: Never true    Ran Out of Food in the Last Year: Never true   Transportation Needs: No Transportation Needs    Lack of Transportation (Medical): No    Lack of Transportation (Non-Medical): No       SURGICAL HISTORY    Past Surgical History:   Procedure Laterality Date    BRONCHOSCOPY N/A 10/08/2020    BRONCHOSCOPY performed by Reese Nuno MD at [facility]      intussusception    COLONOSCOPY  09/22/2020    Dr. Cornelius Morton with [address] [facility]    TESTICLE SURGERY Right 2011    undecended testicle operation     TYMPANOSTOMY TUBE PLACEMENT  1995       CURRENT MEDICATIONS    Current Outpatient Medications   Medication Sig Dispense Refill    buPROPion (WELLBUTRIN XL) 300 MG extended release tablet Take 1 tablet by mouth every morning 30 tablet 2    omeprazole (PRILOSEC) 40 MG delayed release capsule Take 1 capsule by mouth every morning (before breakfast) 30 capsule 5    ondansetron (ZOFRAN) 4 MG tablet Take 1 tablet by mouth daily as needed for Nausea or Vomiting 30 tablet 5     No current facility-administered medications for this visit. ALLERGIES    Allergies   Allergen Reactions    Onion Anaphylaxis, Hives and Other (See Comments)    Rondec-D  [Chlophedianol-Pseudoephedrine] Other (See Comments)    Rondec Hives and Nausea And Vomiting       PHYSICAL EXAM   Physical Exam  Vitals and nursing note reviewed. Constitutional:       General: He is not in acute distress. Appearance: He is well-developed, well-groomed and overweight. He is not diaphoretic. HENT:      Head: Normocephalic. Right Ear: Hearing normal.      Left Ear: Hearing normal.   Eyes:      General:         Right eye: No discharge. Left eye: No discharge. Pupils: Pupils are equal.   Cardiovascular:      Rate and Rhythm: Normal rate and regular rhythm. Pulses: Normal pulses. Radial pulses are 2+ on the right side and 2+ on the left side.       Heart sounds: Normal heart sounds, S1 normal and S2 normal. No murmur heard. Pulmonary:      Effort: Pulmonary effort is normal. No respiratory distress. Breath sounds: Normal breath sounds. No wheezing. Musculoskeletal:      Cervical back: Tenderness and bony tenderness present. Decreased range of motion. Thoracic back: Tenderness and bony tenderness present. Decreased range of motion. Lumbar back: Tenderness and bony tenderness present. Decreased range of motion. Back:    Skin:     General: Skin is warm and dry. Neurological:      Mental Status: He is alert and oriented to person, place, and time. Psychiatric:         Behavior: Behavior normal. Behavior is cooperative. ASSESSMENT/PLAN  1. Anxiety  -     buPROPion (WELLBUTRIN XL) 300 MG extended release tablet; Take 1 tablet by mouth every morning, Disp-30 tablet, R-2Normal  2. Dysthymia  -     buPROPion (WELLBUTRIN XL) 300 MG extended release tablet; Take 1 tablet by mouth every morning, Disp-30 tablet, R-2Normal  3. Chronic bilateral low back pain with bilateral sciatica  -     External Referral To Neurology  4. Other spondylosis, lumbar region  -     External Referral To Neurology  5. Chronic neck pain  -     External Referral To Neurology  6. Bilateral numbness and tingling of arms and legs  -     External Referral To Neurology    Increase Wellbutrin from 150 mg to 300 mg. Continue Ativan sparingly as needed. Will refer to [facility] neurology for further evaluation of neurologic symptoms. Continue treating back pain with over-the-counter pain relievers, heat/ice as needed and stretching regularly       Luz Lowe received counseling on the following healthy behaviors: nutrition, exercise, and medication adherence  Reviewed prior labs and health maintenance  Continue current medications, diet and exercise. Discussed use, benefit, and side effects of prescribed medications. Barriers to medication compliance addressed.    Patient given educational materials - see patient instructions  Was a self-tracking handout given in paper form or via [employer]? Yes    Requested Prescriptions     Signed Prescriptions Disp Refills    buPROPion (WELLBUTRIN XL) 300 MG extended release tablet 30 tablet 2     Sig: Take 1 tablet by mouth every morning       All patient questions answered. Patient voiced understanding. Quality Measures    Body mass index is 28.26 kg/m². Elevated. Weight control planned discussed Healthy diet and regular exercise. BP: 111/74 Blood pressure is Normal. Treatment plan consists of No treatment change needed. No results found for: LDLCALC, LDLCHOLESTEROL, LDLDIRECT (goal LDL reduction with dx if diabetes is 50% LDL reduction)      PHQ Scores 11/30/2022 10/19/2022 9/7/2021 12/17/2020 1/7/2020 3/26/2019   PHQ2 Score 5 1 0 0 0 0   PHQ9 Score 19 5 0 0 0 0     Interpretation of Total Score Depression Severity: 1-4 = Minimal depression, 5-9 = Mild depression, 10-14 = Moderate depression, 15-19 = Moderately severe depression, 20-27 = Severe depression     Return in about 6 weeks (around 1/11/2023) for Anxiety, depression, back pain.     (Please note that portions of this note were completed with a voice recognition program. Efforts were made to edit the dictations but occasionally words are mis-transcribed.)      Electronically signed by ANDREA Leon CNP on 11/30/22 at 9:52 AM EST

## 2023-01-11 ENCOUNTER — OFFICE VISIT (OUTPATIENT)
Dept: FAMILY MEDICINE CLINIC | Age: 28
End: 2023-01-11
Payer: MEDICAID

## 2023-01-11 VITALS
SYSTOLIC BLOOD PRESSURE: 115 MMHG | HEART RATE: 90 BPM | WEIGHT: 220 LBS | TEMPERATURE: 99.9 F | HEIGHT: 75 IN | RESPIRATION RATE: 16 BRPM | BODY MASS INDEX: 27.35 KG/M2 | OXYGEN SATURATION: 99 % | DIASTOLIC BLOOD PRESSURE: 74 MMHG

## 2023-01-11 DIAGNOSIS — M54.2 CHRONIC NECK PAIN: ICD-10-CM

## 2023-01-11 DIAGNOSIS — R20.0 BILATERAL NUMBNESS AND TINGLING OF ARMS AND LEGS: ICD-10-CM

## 2023-01-11 DIAGNOSIS — R20.2 BILATERAL NUMBNESS AND TINGLING OF ARMS AND LEGS: ICD-10-CM

## 2023-01-11 DIAGNOSIS — M47.896 OTHER SPONDYLOSIS, LUMBAR REGION: ICD-10-CM

## 2023-01-11 DIAGNOSIS — G89.29 CHRONIC BILATERAL LOW BACK PAIN WITH BILATERAL SCIATICA: ICD-10-CM

## 2023-01-11 DIAGNOSIS — F90.2 ATTENTION DEFICIT HYPERACTIVITY DISORDER (ADHD), COMBINED TYPE: ICD-10-CM

## 2023-01-11 DIAGNOSIS — G89.29 CHRONIC NECK PAIN: ICD-10-CM

## 2023-01-11 DIAGNOSIS — M54.42 CHRONIC BILATERAL LOW BACK PAIN WITH BILATERAL SCIATICA: ICD-10-CM

## 2023-01-11 DIAGNOSIS — F34.1 DYSTHYMIA: ICD-10-CM

## 2023-01-11 DIAGNOSIS — M54.41 CHRONIC BILATERAL LOW BACK PAIN WITH BILATERAL SCIATICA: ICD-10-CM

## 2023-01-11 DIAGNOSIS — F41.9 ANXIETY: Primary | ICD-10-CM

## 2023-01-11 PROCEDURE — 1036F TOBACCO NON-USER: CPT | Performed by: NURSE PRACTITIONER

## 2023-01-11 PROCEDURE — G8427 DOCREV CUR MEDS BY ELIG CLIN: HCPCS | Performed by: NURSE PRACTITIONER

## 2023-01-11 PROCEDURE — G8484 FLU IMMUNIZE NO ADMIN: HCPCS | Performed by: NURSE PRACTITIONER

## 2023-01-11 PROCEDURE — G8417 CALC BMI ABV UP PARAM F/U: HCPCS | Performed by: NURSE PRACTITIONER

## 2023-01-11 PROCEDURE — 99213 OFFICE O/P EST LOW 20 MIN: CPT | Performed by: NURSE PRACTITIONER

## 2023-01-11 RX ORDER — BUPROPION HYDROCHLORIDE 150 MG/1
150 TABLET ORAL EVERY MORNING
Qty: 30 TABLET | Refills: 3 | Status: SHIPPED | OUTPATIENT
Start: 2023-01-11

## 2023-01-11 RX ORDER — DEXTROAMPHETAMINE SACCHARATE, AMPHETAMINE ASPARTATE MONOHYDRATE, DEXTROAMPHETAMINE SULFATE AND AMPHETAMINE SULFATE 3.75; 3.75; 3.75; 3.75 MG/1; MG/1; MG/1; MG/1
15 CAPSULE, EXTENDED RELEASE ORAL EVERY MORNING
Qty: 30 CAPSULE | Refills: 0 | Status: SHIPPED | OUTPATIENT
Start: 2023-01-11 | End: 2023-02-10

## 2023-01-11 ASSESSMENT — PATIENT HEALTH QUESTIONNAIRE - PHQ9
5. POOR APPETITE OR OVEREATING: 2
2. FEELING DOWN, DEPRESSED OR HOPELESS: 1
SUM OF ALL RESPONSES TO PHQ QUESTIONS 1-9: 16
10. IF YOU CHECKED OFF ANY PROBLEMS, HOW DIFFICULT HAVE THESE PROBLEMS MADE IT FOR YOU TO DO YOUR WORK, TAKE CARE OF THINGS AT HOME, OR GET ALONG WITH OTHER PEOPLE: 2
3. TROUBLE FALLING OR STAYING ASLEEP: 2
SUM OF ALL RESPONSES TO PHQ9 QUESTIONS 1 & 2: 3
1. LITTLE INTEREST OR PLEASURE IN DOING THINGS: 2
4. FEELING TIRED OR HAVING LITTLE ENERGY: 3
8. MOVING OR SPEAKING SO SLOWLY THAT OTHER PEOPLE COULD HAVE NOTICED. OR THE OPPOSITE, BEING SO FIGETY OR RESTLESS THAT YOU HAVE BEEN MOVING AROUND A LOT MORE THAN USUAL: 1
6. FEELING BAD ABOUT YOURSELF - OR THAT YOU ARE A FAILURE OR HAVE LET YOURSELF OR YOUR FAMILY DOWN: 2
9. THOUGHTS THAT YOU WOULD BE BETTER OFF DEAD, OR OF HURTING YOURSELF: 0
SUM OF ALL RESPONSES TO PHQ QUESTIONS 1-9: 16
7. TROUBLE CONCENTRATING ON THINGS, SUCH AS READING THE NEWSPAPER OR WATCHING TELEVISION: 3

## 2023-01-11 ASSESSMENT — ANXIETY QUESTIONNAIRES
5. BEING SO RESTLESS THAT IT IS HARD TO SIT STILL: 3
GAD7 TOTAL SCORE: 13
2. NOT BEING ABLE TO STOP OR CONTROL WORRYING: 2
4. TROUBLE RELAXING: 3
6. BECOMING EASILY ANNOYED OR IRRITABLE: 1
7. FEELING AFRAID AS IF SOMETHING AWFUL MIGHT HAPPEN: 0
IF YOU CHECKED OFF ANY PROBLEMS ON THIS QUESTIONNAIRE, HOW DIFFICULT HAVE THESE PROBLEMS MADE IT FOR YOU TO DO YOUR WORK, TAKE CARE OF THINGS AT HOME, OR GET ALONG WITH OTHER PEOPLE: VERY DIFFICULT
1. FEELING NERVOUS, ANXIOUS, OR ON EDGE: 2
3. WORRYING TOO MUCH ABOUT DIFFERENT THINGS: 2

## 2023-01-11 ASSESSMENT — ENCOUNTER SYMPTOMS
BACK PAIN: 1
NAUSEA: 1
EYES NEGATIVE: 1
SHORTNESS OF BREATH: 1
ABDOMINAL PAIN: 1

## 2023-01-11 NOTE — PATIENT INSTRUCTIONS
New Updates for [facility]    Thank you for choosing [facility] to give you the best care! [facility] ([facility]) is always trying to think of new ways to help their patients. We are asking all patients to try out the new digital registration that is now available through the [employer] [address]. Down load today! . Via the radu you're now able to update your personal and registration information prior to your upcoming appointment. This will save you time once you arrive at the office to check-in, not to mention your information remains safe!! Many other perks come from signing up for an account, such as:  Requesting refills  Scheduling an appointment  Completing an E-Visit  Sending a message to the office/provider  Having access to your medication list  Paying your bill/copay prior to your appointment  Scheduling your yearly mammogram  Review your test results    If you are not familiar the [facility], please ask one of us and we will be happy to answer any questions or help you set-up your account.

## 2023-01-11 NOTE — PROGRESS NOTES
[facility]  [address] [place] [address] [address] [address]  Dept: [phone]    1/11/2023    CHIEF COMPLAINT    Chief Complaint   Patient presents with    Anxiety     Medication review       HPI    Desiree Moss is a 32 y.o. male who presents   Chief Complaint   Patient presents with    Anxiety     Medication review     HPI    Appointment to f/u on Anxiety and depression    Anxiety/Depression- Increased Wellbutrin from 150 mg to 300 mg at last appointment. Feels no difference in the Wellbutrin increase. ADHD- dx as a child from his provider as a child. He feels like he cannot focus on little things even. His son was diagnosed with ADHD about 1 year ago and has been noticing   He has never taken medication, but would like to trial something. Symptoms:   Short Attention Span:  [x] Yes    [] No   Easy Distractibility:    [x] Yes    [] No   Poor Listening:    [x] Yes    [] No   Forgetfulness:    [x] Yes   []  No     Careless Mistakes  [] Yes    [x] No   Fidgeting  [x] Yes    [] No    Back pain, numbness/tingling in bilateral upper and lower extremities- Unchanged. Referral provided to [facility]. Will be seen 2/8    Vitals:    01/11/23 1127   BP: 115/74   Site: Left Upper Arm   Position: Sitting   Cuff Size: Large Adult   Pulse: 90   Resp: 16   Temp: 99.9 °F (37.7 °C)   TempSrc: Temporal   SpO2: 99%   Weight: 220 lb (99.8 kg)   Height: 6' 3\" (1.905 m)       Wt Readings from Last 3 Encounters:   01/11/23 220 lb (99.8 kg)   11/30/22 226 lb 2 oz (102.6 kg)   10/19/22 220 lb 12.8 oz (100.2 kg)     BP Readings from Last 3 Encounters:   01/11/23 115/74   11/30/22 111/74   10/19/22 132/84       REVIEW OF SYSTEMS    Review of Systems   Constitutional: Negative. Negative for chills, fatigue and fever. Eyes: Negative. Negative for visual disturbance. Respiratory:  Positive for shortness of breath (with anxiety).     Cardiovascular:  Positive for palpitations (with anxiety). Negative for chest pain. Gastrointestinal:  Positive for abdominal pain and nausea. Musculoskeletal:  Positive for back pain and neck pain. Neurological: Negative. Negative for dizziness and headaches. Psychiatric/Behavioral:  Positive for decreased concentration, dysphoric mood (mild) and sleep disturbance. The patient is nervous/anxious.       PAST MEDICAL HISTORY    Past Medical History:   Diagnosis Date    Chronic back pain     Diverticulosis     GERD (gastroesophageal reflux disease)     Hemoptysis     History of intussusception     OR at [facility]    Lung nodule     LLL nodule    Undescended right testes        FAMILY HISTORY    Family History   Problem Relation Age of Onset    High Blood Pressure Mother     Heart Disease Mother     Heart Attack Mother 40    Cancer Father         unknown kind     Heart Attack Maternal Grandmother 61        COD     Other Sister         POTS     Heart Attack Maternal Grandfather 48    Prostate Cancer Maternal Grandfather     Stroke Maternal Grandfather         2000    No Known Problems Paternal Grandmother     No Known Problems Paternal Grandfather        SOCIAL HISTORY    Social History     Socioeconomic History    Marital status: Single     Spouse name: None    Number of children: None    Years of education: None    Highest education level: None   Tobacco Use    Smoking status: Former     Packs/day: 1.00     Years: 6.00     Pack years: 6.00     Types: Cigarettes     Quit date: 2018     Years since quittin.6    Smokeless tobacco: Never   Vaping Use    Vaping Use: Every day    Substances: Always   Substance and Sexual Activity    Alcohol use: Yes     Comment: Occasionally, very rare    Drug use: No    Sexual activity: Yes     Partners: Female     Social Determinants of Health     Financial Resource Strain: Low Risk     Difficulty of Paying Living Expenses: Not hard at all   Food Insecurity: No Food Insecurity Worried About [facility] in the Last Year: Never true    [facility] in the Last Year: Never true   Transportation Needs: No Transportation Needs    Lack of Transportation (Medical): No    Lack of Transportation (Non-Medical): No       SURGICAL HISTORY    Past Surgical History:   Procedure Laterality Date    BRONCHOSCOPY N/A 10/08/2020    BRONCHOSCOPY performed by Shira Hansen MD at [facility]    COLON SURGERY      intussusception    COLONOSCOPY  09/22/2020    Dr. Jenny Marques with [facility]    TESTICLE SURGERY Right 2011    undecended testicle operation     TYMPANOSTOMY TUBE PLACEMENT  1995       CURRENT MEDICATIONS    Current Outpatient Medications   Medication Sig Dispense Refill    amphetamine-dextroamphetamine (ADDERALL XR) 15 MG extended release capsule Take 1 capsule by mouth every morning for 30 days. Max Daily Amount: 15 mg 30 capsule 0    buPROPion (WELLBUTRIN XL) 150 MG extended release tablet Take 1 tablet by mouth every morning 30 tablet 3    omeprazole (PRILOSEC) 40 MG delayed release capsule Take 1 capsule by mouth every morning (before breakfast) 30 capsule 5    ondansetron (ZOFRAN) 4 MG tablet Take 1 tablet by mouth daily as needed for Nausea or Vomiting 30 tablet 5     No current facility-administered medications for this visit. ALLERGIES    Allergies   Allergen Reactions    Onion Anaphylaxis, Hives and Other (See Comments)    Rondec-D  [Chlophedianol-Pseudoephedrine] Other (See Comments)    Rondec Hives and Nausea And Vomiting       PHYSICAL EXAM   Physical Exam  Vitals and nursing note reviewed. Constitutional:       General: He is not in acute distress. Appearance: Normal appearance. He is well-developed, well-groomed and overweight. He is not diaphoretic. Interventions: Face mask in place. HENT:      Head: Normocephalic. Right Ear: Hearing normal.      Left Ear: Hearing normal.   Eyes:      General:         Right eye: No discharge. Left eye: No discharge. Pupils: Pupils are equal.   Cardiovascular:      Rate and Rhythm: Normal rate and regular rhythm. Pulses: Normal pulses. Radial pulses are 2+ on the right side and 2+ on the left side. Heart sounds: Normal heart sounds, S1 normal and S2 normal. No murmur heard. Pulmonary:      Effort: Pulmonary effort is normal. No respiratory distress. Breath sounds: Normal breath sounds. No wheezing. Musculoskeletal:      Cervical back: Normal range of motion. Skin:     General: Skin is warm and dry. Neurological:      Mental Status: He is alert and oriented to person, place, and time. Psychiatric:         Behavior: Behavior normal. Behavior is cooperative. ASSESSMENT/PLAN  1. Anxiety  -     buPROPion (WELLBUTRIN XL) 150 MG extended release tablet; Take 1 tablet by mouth every morning, Disp-30 tablet, R-3Normal  2. Dysthymia  -     buPROPion (WELLBUTRIN XL) 150 MG extended release tablet; Take 1 tablet by mouth every morning, Disp-30 tablet, R-3Normal  3. Attention deficit hyperactivity disorder (ADHD), combined type  -     amphetamine-dextroamphetamine (ADDERALL XR) 15 MG extended release capsule; Take 1 capsule by mouth every morning for 30 days. Max Daily Amount: 15 mg, Disp-30 capsule, R-0Normal     Decrease Wellbutrin, trial Adderall XR 15 mg with plans to try 30 mg if needed with this script. Discussed use, benefit, and side effects of prescribed medications. Barriers to medication compliance addressed. Proceed as planned to establish with neurology regarding back pain, numbness and tingling of BUE and BLE. [facility] received counseling on the following healthy behaviors: nutrition and exercise  Reviewed prior labs and health maintenance  Continue current medications, diet and exercise. Discussed use, benefit, and side effects of prescribed medications. Barriers to medication compliance addressed.    Patient given educational materials - see patient instructions  Was a self-tracking handout given in paper form or via [employer]? Yes    Requested Prescriptions     Signed Prescriptions Disp Refills    amphetamine-dextroamphetamine (ADDERALL XR) 15 MG extended release capsule 30 capsule 0     Sig: Take 1 capsule by mouth every morning for 30 days. Max Daily Amount: 15 mg    buPROPion (WELLBUTRIN XL) 150 MG extended release tablet 30 tablet 3     Sig: Take 1 tablet by mouth every morning       All patient questions answered. Patient voiced understanding. Quality Measures    Body mass index is 27.5 kg/m². Elevated. Weight control planned discussed Healthy diet and regular exercise. BP: 115/74 Blood pressure is Normal. Treatment plan consists of No treatment change needed. No results found for: LDLCALC, LDLCHOLESTEROL, LDLDIRECT (goal LDL reduction with dx if diabetes is 50% LDL reduction)      PHQ Scores 1/11/2023 11/30/2022 10/19/2022 9/7/2021 12/17/2020 1/7/2020 3/26/2019   PHQ2 Score 3 5 1 0 0 0 0   PHQ9 Score 16 19 5 0 0 0 0     Interpretation of Total Score Depression Severity: 1-4 = Minimal depression, 5-9 = Mild depression, 10-14 = Moderate depression, 15-19 = Moderately severe depression, 20-27 = Severe depression     Return in about 2 weeks (around 1/25/2023) for ADHD/ADD  check.     (Please note that portions of this note were completed with a voice recognition program. Efforts were made to edit the dictations but occasionally words are mis-transcribed.)      Electronically signed by ANDREA Lopez CNP on 1/11/23 at 11:40 AM EST

## 2023-01-21 PROBLEM — F90.2 ATTENTION DEFICIT HYPERACTIVITY DISORDER (ADHD), COMBINED TYPE: Status: ACTIVE | Noted: 2023-01-21

## 2023-01-21 PROBLEM — F34.1 DYSTHYMIA: Status: ACTIVE | Noted: 2023-01-21

## 2023-01-21 PROBLEM — F41.9 ANXIETY: Status: ACTIVE | Noted: 2023-01-21

## 2023-01-26 ENCOUNTER — TELEMEDICINE (OUTPATIENT)
Dept: FAMILY MEDICINE CLINIC | Age: 28
End: 2023-01-26

## 2023-01-26 DIAGNOSIS — F90.2 ATTENTION DEFICIT HYPERACTIVITY DISORDER (ADHD), COMBINED TYPE: Primary | ICD-10-CM

## 2023-01-26 DIAGNOSIS — F34.1 DYSTHYMIA: ICD-10-CM

## 2023-01-26 DIAGNOSIS — F41.9 ANXIETY: ICD-10-CM

## 2023-01-26 RX ORDER — DEXTROAMPHETAMINE SACCHARATE, AMPHETAMINE ASPARTATE, DEXTROAMPHETAMINE SULFATE AND AMPHETAMINE SULFATE 2.5; 2.5; 2.5; 2.5 MG/1; MG/1; MG/1; MG/1
10 TABLET ORAL 2 TIMES DAILY
Qty: 30 TABLET | Refills: 0 | Status: SHIPPED | OUTPATIENT
Start: 2023-01-26 | End: 2023-02-02 | Stop reason: DRUGHIGH

## 2023-01-26 ASSESSMENT — PATIENT HEALTH QUESTIONNAIRE - PHQ9
SUM OF ALL RESPONSES TO PHQ9 QUESTIONS 1 & 2: 1
7. TROUBLE CONCENTRATING ON THINGS, SUCH AS READING THE NEWSPAPER OR WATCHING TELEVISION: 2
SUM OF ALL RESPONSES TO PHQ QUESTIONS 1-9: 7
6. FEELING BAD ABOUT YOURSELF - OR THAT YOU ARE A FAILURE OR HAVE LET YOURSELF OR YOUR FAMILY DOWN: 0
10. IF YOU CHECKED OFF ANY PROBLEMS, HOW DIFFICULT HAVE THESE PROBLEMS MADE IT FOR YOU TO DO YOUR WORK, TAKE CARE OF THINGS AT HOME, OR GET ALONG WITH OTHER PEOPLE: 1
SUM OF ALL RESPONSES TO PHQ QUESTIONS 1-9: 7
5. POOR APPETITE OR OVEREATING: 3
4. FEELING TIRED OR HAVING LITTLE ENERGY: 1
2. FEELING DOWN, DEPRESSED OR HOPELESS: 1
SUM OF ALL RESPONSES TO PHQ QUESTIONS 1-9: 7
SUM OF ALL RESPONSES TO PHQ QUESTIONS 1-9: 7
8. MOVING OR SPEAKING SO SLOWLY THAT OTHER PEOPLE COULD HAVE NOTICED. OR THE OPPOSITE, BEING SO FIGETY OR RESTLESS THAT YOU HAVE BEEN MOVING AROUND A LOT MORE THAN USUAL: 0
1. LITTLE INTEREST OR PLEASURE IN DOING THINGS: 0
9. THOUGHTS THAT YOU WOULD BE BETTER OFF DEAD, OR OF HURTING YOURSELF: 0
3. TROUBLE FALLING OR STAYING ASLEEP: 0

## 2023-01-26 ASSESSMENT — ENCOUNTER SYMPTOMS
EYES NEGATIVE: 1
NAUSEA: 1
SHORTNESS OF BREATH: 1
BACK PAIN: 1
ABDOMINAL PAIN: 1

## 2023-02-02 ENCOUNTER — TELEMEDICINE (OUTPATIENT)
Dept: FAMILY MEDICINE CLINIC | Age: 28
End: 2023-02-02

## 2023-02-02 ENCOUNTER — PATIENT MESSAGE (OUTPATIENT)
Dept: FAMILY MEDICINE CLINIC | Age: 28
End: 2023-02-02

## 2023-02-02 DIAGNOSIS — F90.2 ATTENTION DEFICIT HYPERACTIVITY DISORDER (ADHD), COMBINED TYPE: Primary | ICD-10-CM

## 2023-02-02 DIAGNOSIS — F34.1 DYSTHYMIA: ICD-10-CM

## 2023-02-02 DIAGNOSIS — F41.9 ANXIETY: ICD-10-CM

## 2023-02-02 RX ORDER — DEXTROAMPHETAMINE SACCHARATE, AMPHETAMINE ASPARTATE, DEXTROAMPHETAMINE SULFATE AND AMPHETAMINE SULFATE 5; 5; 5; 5 MG/1; MG/1; MG/1; MG/1
TABLET ORAL
Qty: 45 TABLET | Refills: 0 | Status: SHIPPED | OUTPATIENT
Start: 2023-02-02 | End: 2023-02-17 | Stop reason: RX

## 2023-02-02 SDOH — ECONOMIC STABILITY: FOOD INSECURITY: WITHIN THE PAST 12 MONTHS, THE FOOD YOU BOUGHT JUST DIDN'T LAST AND YOU DIDN'T HAVE MONEY TO GET MORE.: NEVER TRUE

## 2023-02-02 SDOH — ECONOMIC STABILITY: FOOD INSECURITY: WITHIN THE PAST 12 MONTHS, YOU WORRIED THAT YOUR FOOD WOULD RUN OUT BEFORE YOU GOT MONEY TO BUY MORE.: NEVER TRUE

## 2023-02-02 SDOH — ECONOMIC STABILITY: HOUSING INSECURITY
IN THE LAST 12 MONTHS, WAS THERE A TIME WHEN YOU DID NOT HAVE A STEADY PLACE TO SLEEP OR SLEPT IN A SHELTER (INCLUDING NOW)?: NO

## 2023-02-02 SDOH — ECONOMIC STABILITY: INCOME INSECURITY: HOW HARD IS IT FOR YOU TO PAY FOR THE VERY BASICS LIKE FOOD, HOUSING, MEDICAL CARE, AND HEATING?: NOT HARD AT ALL

## 2023-02-02 ASSESSMENT — ENCOUNTER SYMPTOMS
SHORTNESS OF BREATH: 1
EYES NEGATIVE: 1
ABDOMINAL PAIN: 1
NAUSEA: 1
BACK PAIN: 1

## 2023-02-02 ASSESSMENT — PATIENT HEALTH QUESTIONNAIRE - PHQ9
1. LITTLE INTEREST OR PLEASURE IN DOING THINGS: 1
9. THOUGHTS THAT YOU WOULD BE BETTER OFF DEAD, OR OF HURTING YOURSELF: 0
SUM OF ALL RESPONSES TO PHQ QUESTIONS 1-9: 15
SUM OF ALL RESPONSES TO PHQ9 QUESTIONS 1 & 2: 2
10. IF YOU CHECKED OFF ANY PROBLEMS, HOW DIFFICULT HAVE THESE PROBLEMS MADE IT FOR YOU TO DO YOUR WORK, TAKE CARE OF THINGS AT HOME, OR GET ALONG WITH OTHER PEOPLE: 1
5. POOR APPETITE OR OVEREATING: 3
6. FEELING BAD ABOUT YOURSELF - OR THAT YOU ARE A FAILURE OR HAVE LET YOURSELF OR YOUR FAMILY DOWN: 1
3. TROUBLE FALLING OR STAYING ASLEEP: 3
2. FEELING DOWN, DEPRESSED OR HOPELESS: 1
SUM OF ALL RESPONSES TO PHQ QUESTIONS 1-9: 15
4. FEELING TIRED OR HAVING LITTLE ENERGY: 3
SUM OF ALL RESPONSES TO PHQ QUESTIONS 1-9: 15
7. TROUBLE CONCENTRATING ON THINGS, SUCH AS READING THE NEWSPAPER OR WATCHING TELEVISION: 3
SUM OF ALL RESPONSES TO PHQ QUESTIONS 1-9: 15
8. MOVING OR SPEAKING SO SLOWLY THAT OTHER PEOPLE COULD HAVE NOTICED. OR THE OPPOSITE, BEING SO FIGETY OR RESTLESS THAT YOU HAVE BEEN MOVING AROUND A LOT MORE THAN USUAL: 0

## 2023-02-02 NOTE — PROGRESS NOTES
[place] [address] [facility] [address]  [place] [address] [address]  Dept: [phone]    2023    TELEHEALTH EVALUATION -- Audio/Visual (During FSQDL-02 public health emergency)  Duane Choi (:  1995) has requested an audio/video evaluation for the following concern(s):    HPI:  Appointment to follow-up on anxiety, depression and ADHD. Adderall XR 15 mg did not seem very effective. At a later appointment in January Adderall was switched to Adderall 10 mg. Patient was to take twice daily, but advised he could trial taking Adderall 15 mg of the IR formulation or Adderall 20 mg per dose  TODAY- feels his appetite is better, but his sleep is not any better. He does feel like the Adderall IR is a better and more effective dose for him. He takes the Adderall IR 20 mg in the AM and 10 mg around lunch time. This is very effective. Depression improved with ADHD treatment. Patient-Reported Vitals 2023   Patient-Reported Weight 210 lbs   Patient-Reported Height 6 feet 3 inches   Patient-Reported Temperature -        There were no vitals filed for this visit. Wt Readings from Last 3 Encounters:   23 220 lb (99.8 kg)   22 226 lb 2 oz (102.6 kg)   10/19/22 220 lb 12.8 oz (100.2 kg)     BP Readings from Last 3 Encounters:   23 115/74   22 111/74   10/19/22 132/84       REVIEW OF SYSTEMS:   Review of Systems   Constitutional:  Negative for appetite change (Improved), chills, fatigue and fever. Eyes: Negative. Negative for visual disturbance. Respiratory:  Positive for shortness of breath (with anxiety). Cardiovascular:  Positive for palpitations (with anxiety). Negative for chest pain. Gastrointestinal:  Positive for abdominal pain and nausea. Musculoskeletal:  Positive for back pain and neck pain. Neurological: Negative. Negative for dizziness and headaches.    Psychiatric/Behavioral:  Positive for decreased concentration, dysphoric mood (mild) and sleep disturbance. The patient is nervous/anxious.       PAST MEDICAL HISTORY:    Past Medical History:   Diagnosis Date    Chronic back pain     Diverticulosis     GERD (gastroesophageal reflux disease)     Hemoptysis     History of intussusception     OR at [facility]    Lung nodule     LLL nodule    Undescended right testes        FAMILY HISTORY:    Family History   Problem Relation Age of Onset    High Blood Pressure Mother     Heart Disease Mother     Heart Attack Mother 40    Cancer Father         unknown kind     Heart Attack Maternal Grandmother 61        COD     Other Sister         POTS     Heart Attack Maternal Grandfather 48    Prostate Cancer Maternal Grandfather     Stroke Maternal Grandfather         2000    No Known Problems Paternal Grandmother     No Known Problems Paternal Grandfather        SOCIAL HISTORY:    Social History     Socioeconomic History    Marital status: Single   Tobacco Use    Smoking status: Former     Packs/day: 1.00     Years: 6.00     Pack years: 6.00     Types: Cigarettes     Quit date: 2018     Years since quittin.7    Smokeless tobacco: Never   Vaping Use    Vaping Use: Every day    Substances: Always   Substance and Sexual Activity    Alcohol use: Yes     Comment: Occasionally, very rare    Drug use: No    Sexual activity: Yes     Partners: Female     Social Determinants of Health     Financial Resource Strain: Low Risk     Difficulty of Paying Living Expenses: Not hard at all   Food Insecurity: No Food Insecurity    Worried About Running Out of Food in the Last Year: Never true    Ran Out of Food in the Last Year: Never true   Transportation Needs: No Transportation Needs    Lack of Transportation (Medical): No    Lack of Transportation (Non-Medical): No   Housing Stability: Unknown    Unstable Housing in the Last Year: No       SURGICAL HISTORY:    Past Surgical History:   Procedure Laterality Date    BRONCHOSCOPY N/A 10/08/2020    [facility]      [facility]uss[facility]ception    COLONOSCOPY  09/22/2020    Dr. Katiana Hartman with [facility]    TESTICLE SURGERY Right 2011    undecended testicle operation     TYMPANOSTOMY TUBE PLACEMENT  1995       CURRENT MEDICATIONS:    Current Outpatient Medications   Medication Sig Dispense Refill    buPROPion (WELLBUTRIN XL) 150 MG extended release tablet Take 1 tablet by mouth every morning 30 tablet 3    omeprazole (PRILOSEC) 40 MG delayed release capsule Take 1 capsule by mouth every morning (before breakfast) 30 capsule 5    ondansetron (ZOFRAN) 4 MG tablet Take 1 tablet by mouth daily as needed for Nausea or Vomiting 30 tablet 5    amphetamine-dextroamphetamine (ADDERALL, 10MG,) 10 MG tablet Take 2 tablets by mouth every morning AND 1 tablet Daily with lunch. Do all this for 30 days. Max Daily Amount: 30 mg. 90 tablet 0     No current facility-administered medications for this visit. ALLERGIES:   Allergies   Allergen Reactions    Onion Anaphylaxis, Hives and Other (See Comments)    Rondec-D  [Chlophedianol-Pseudoephedrine] Other (See Comments)    Rondec Hives and Nausea And Vomiting       PHYSICAL EXAM:   Physical Exam  Vitals reviewed. Constitutional:       General: He is not in acute distress. Appearance: Normal appearance. He is well-developed and well-groomed. He is not ill-appearing, toxic-appearing or diaphoretic. HENT:      Head: Normocephalic. Right Ear: Hearing normal.      Left Ear: Hearing normal.      Mouth/Throat:      Lips: Pink. Mouth: Mucous membranes are moist.   Eyes:      General: Lids are normal.         Right eye: No discharge. Left eye: No discharge. Extraocular Movements: Extraocular movements intact. Conjunctiva/sclera: Conjunctivae normal.   Pulmonary:      Effort: Pulmonary effort is normal. No respiratory distress.    Musculoskeletal:      Cervical back: Normal range of motion. Skin:     Findings: No rash. Neurological:      General: No focal deficit present. Mental Status: He is alert and oriented to person, place, and time. Coordination: Coordination is intact. Psychiatric:         Attention and Perception: Attention normal.         Mood and Affect: Mood and affect normal.         Speech: Speech normal.         Behavior: Behavior is cooperative. Thought Content: Thought content normal.        ASSESSMENT/PLAN:  1. Attention deficit hyperactivity disorder (ADHD), combined type  2. Anxiety  3. Dysthymia     Will continue Adderall IR with 20 mg in the AM and 10 mg in the afternoon. Discussed melatonin with Ltheanine for sleep and or working on sleep hygiene habits. Anxiety and depression borderline controlled. . Continue current medications, self care and physical activity. Discussed that improved sleep may help overall emotional health     No follow-ups on file. Baron Brandt is a 32 y.o. male being evaluated by a Virtual Visit (video visit) encounter to address concerns as mentioned above. A caregiver was present when appropriate. Due to this being a TeleHealth encounter (During VBKMX-05 public health emergency), evaluation of the following organ systems was limited: Vitals/Constitutional/EENT/Resp/CV/GI//MS/Neuro/Skin/Heme-Lymph-Imm. Pursuant to the emergency declaration under the [facility], [address] [place] [address] authority and the [employer] Act, this Virtual Visit was conducted with patient's (and/or legal guardian's) consent, to reduce the patient's risk of exposure to COVID-19 and provide necessary medical care. The patient (and/or legal guardian) has also been advised to contact this office for worsening conditions or problems, and seek emergency medical treatment and/or call 911 if deemed necessary.      Services were provided through a video synchronous discussion virtually to substitute for in-person clinic visit. Patient and provider were located at their individual homes. --ANDREA Talley CNP on 2/2/2023 at 10:07 AM    (Please note that portions of this note were completed with a voice recognition program. Efforts were made to edit the dictations but occasionally words are mis-transcribed. )      An electronic signature was used to authenticate this note.

## 2023-02-13 NOTE — TELEPHONE ENCOUNTER
From: Donte Richards  To: Hussein   Sent: 2/2/2023 10:19 AM EST  Subject: Sleeping Medication    Here is what we had talked about. It's Melatonin with L-Theanine.          Take Care & Stay Loralie Bring

## 2023-02-17 RX ORDER — DEXTROAMPHETAMINE SACCHARATE, AMPHETAMINE ASPARTATE, DEXTROAMPHETAMINE SULFATE AND AMPHETAMINE SULFATE 2.5; 2.5; 2.5; 2.5 MG/1; MG/1; MG/1; MG/1
TABLET ORAL
Qty: 90 TABLET | Refills: 0 | Status: SHIPPED | OUTPATIENT
Start: 2023-02-17 | End: 2023-03-19

## 2023-02-22 ENCOUNTER — OFFICE VISIT (OUTPATIENT)
Dept: FAMILY MEDICINE CLINIC | Age: 28
End: 2023-02-22

## 2023-02-22 VITALS
DIASTOLIC BLOOD PRESSURE: 74 MMHG | SYSTOLIC BLOOD PRESSURE: 116 MMHG | OXYGEN SATURATION: 99 % | BODY MASS INDEX: 27.1 KG/M2 | TEMPERATURE: 98.4 F | RESPIRATION RATE: 16 BRPM | HEIGHT: 75 IN | HEART RATE: 83 BPM | WEIGHT: 218 LBS

## 2023-02-22 DIAGNOSIS — F41.9 ANXIETY: ICD-10-CM

## 2023-02-22 DIAGNOSIS — F90.2 ATTENTION DEFICIT HYPERACTIVITY DISORDER (ADHD), COMBINED TYPE: Primary | ICD-10-CM

## 2023-02-22 DIAGNOSIS — F34.1 DYSTHYMIA: ICD-10-CM

## 2023-02-22 RX ORDER — DEXTROAMPHETAMINE SACCHARATE, AMPHETAMINE ASPARTATE, DEXTROAMPHETAMINE SULFATE AND AMPHETAMINE SULFATE 7.5; 7.5; 7.5; 7.5 MG/1; MG/1; MG/1; MG/1
30 TABLET ORAL DAILY
Qty: 30 TABLET | Refills: 0 | Status: SHIPPED | OUTPATIENT
Start: 2023-02-22 | End: 2023-03-24

## 2023-02-22 ASSESSMENT — ANXIETY QUESTIONNAIRES
2. NOT BEING ABLE TO STOP OR CONTROL WORRYING: 1
GAD7 TOTAL SCORE: 10
IF YOU CHECKED OFF ANY PROBLEMS ON THIS QUESTIONNAIRE, HOW DIFFICULT HAVE THESE PROBLEMS MADE IT FOR YOU TO DO YOUR WORK, TAKE CARE OF THINGS AT HOME, OR GET ALONG WITH OTHER PEOPLE: SOMEWHAT DIFFICULT
5. BEING SO RESTLESS THAT IT IS HARD TO SIT STILL: 2
4. TROUBLE RELAXING: 3
1. FEELING NERVOUS, ANXIOUS, OR ON EDGE: 1
7. FEELING AFRAID AS IF SOMETHING AWFUL MIGHT HAPPEN: 0
3. WORRYING TOO MUCH ABOUT DIFFERENT THINGS: 2
6. BECOMING EASILY ANNOYED OR IRRITABLE: 1

## 2023-02-22 ASSESSMENT — PATIENT HEALTH QUESTIONNAIRE - PHQ9
SUM OF ALL RESPONSES TO PHQ QUESTIONS 1-9: 10
3. TROUBLE FALLING OR STAYING ASLEEP: 3
9. THOUGHTS THAT YOU WOULD BE BETTER OFF DEAD, OR OF HURTING YOURSELF: 0
SUM OF ALL RESPONSES TO PHQ9 QUESTIONS 1 & 2: 2
2. FEELING DOWN, DEPRESSED OR HOPELESS: 1
SUM OF ALL RESPONSES TO PHQ QUESTIONS 1-9: 10
5. POOR APPETITE OR OVEREATING: 1
1. LITTLE INTEREST OR PLEASURE IN DOING THINGS: 1
4. FEELING TIRED OR HAVING LITTLE ENERGY: 1
10. IF YOU CHECKED OFF ANY PROBLEMS, HOW DIFFICULT HAVE THESE PROBLEMS MADE IT FOR YOU TO DO YOUR WORK, TAKE CARE OF THINGS AT HOME, OR GET ALONG WITH OTHER PEOPLE: 1
7. TROUBLE CONCENTRATING ON THINGS, SUCH AS READING THE NEWSPAPER OR WATCHING TELEVISION: 0
8. MOVING OR SPEAKING SO SLOWLY THAT OTHER PEOPLE COULD HAVE NOTICED. OR THE OPPOSITE, BEING SO FIGETY OR RESTLESS THAT YOU HAVE BEEN MOVING AROUND A LOT MORE THAN USUAL: 2
SUM OF ALL RESPONSES TO PHQ QUESTIONS 1-9: 10
SUM OF ALL RESPONSES TO PHQ QUESTIONS 1-9: 10
6. FEELING BAD ABOUT YOURSELF - OR THAT YOU ARE A FAILURE OR HAVE LET YOURSELF OR YOUR FAMILY DOWN: 1

## 2023-02-22 ASSESSMENT — ENCOUNTER SYMPTOMS
NAUSEA: 0
EYES NEGATIVE: 1
BACK PAIN: 1
ABDOMINAL PAIN: 0
CONSTIPATION: 1
SHORTNESS OF BREATH: 0
DIARRHEA: 0

## 2023-02-22 NOTE — PROGRESS NOTES
[facility] [id number]  Dept: [phone]    2/22/2023    CHIEF COMPLAINT    Chief Complaint   Patient presents with    Anxiety    Discuss Medications     HPI    Kaiden Son is a 32 y.o. male who presents   Chief Complaint   Patient presents with    Anxiety    Discuss Medications     Appointment to f/u on anxiety and ADHD. Anxiety- better controlled on Adderall 30 mg. Patient feels Adderall has been more effective than Wellbutrin. Continues taking Ativan sparingly as needed    ADHD- Dosage that has been found to be most effective has been Adderall immediate release 20 mg in the morning and 10 mg around lunchtime. Medication has been difficult to get effective dosages due to medication shortages     Chronic back pain, numbness and tingling-EMG scheduled 3/6/2023. Vitals:    02/22/23 0950   BP: 116/74   Site: Left Upper Arm   Position: Sitting   Cuff Size: Large Adult   Pulse: 83   Resp: 16   Temp: 98.4 °F (36.9 °C)   TempSrc: Temporal   SpO2: 99%   Weight: 218 lb (98.9 kg)   Height: 6' 3\" (1.905 m)       Wt Readings from Last 3 Encounters:   02/22/23 218 lb (98.9 kg)   01/11/23 220 lb (99.8 kg)   11/30/22 226 lb 2 oz (102.6 kg)     BP Readings from Last 3 Encounters:   02/22/23 116/74   01/11/23 115/74   11/30/22 111/74       REVIEW OF SYSTEMS    Review of Systems   Constitutional: Negative. Negative for appetite change (improved), chills, fatigue and fever. Eyes: Negative. Negative for visual disturbance. Respiratory:  Negative for shortness of breath. Cardiovascular: Negative. Negative for chest pain and palpitations. Gastrointestinal:  Positive for constipation. Negative for abdominal pain, diarrhea and nausea. Musculoskeletal:  Positive for back pain and neck pain. Neurological: Negative. Negative for dizziness and headaches.    Psychiatric/Behavioral:  Positive for decreased concentration, dysphoric mood (mild) and sleep disturbance. The patient is nervous/anxious.       PAST MEDICAL HISTORY    Past Medical History:   Diagnosis Date    Chronic back pain     Diverticulosis     GERD (gastroesophageal reflux disease)     Hemoptysis     History of intussusception     OR at [facility]    Lung nodule     LLL nodule    Undescended right testes        FAMILY HISTORY    Family History   Problem Relation Age of Onset    High Blood Pressure Mother     Heart Disease Mother     Heart Attack Mother 40    Cancer Father         unknown kind     Heart Attack Maternal Grandmother 61        COD     Other Sister         POTS     Heart Attack Maternal Grandfather 48    Prostate Cancer Maternal Grandfather     Stroke Maternal Grandfather         2000    No Known Problems Paternal Grandmother     No Known Problems Paternal Grandfather        SOCIAL HISTORY    Social History     Socioeconomic History    Marital status: Single     Spouse name: None    Number of children: None    Years of education: None    Highest education level: None   Tobacco Use    Smoking status: Former     Packs/day: 1.00     Years: 6.00     Pack years: 6.00     Types: Cigarettes     Quit date: 2018     Years since quittin.7    Smokeless tobacco: Never   Vaping Use    Vaping Use: Every day    Substances: Always   Substance and Sexual Activity    Alcohol use: Yes     Comment: Occasionally, very rare    Drug use: No    Sexual activity: Yes     Partners: Female     Social Determinants of Health     Financial Resource Strain: Low Risk     Difficulty of Paying Living Expenses: Not hard at all   Food Insecurity: No Food Insecurity    Worried About Running Out of Food in the Last Year: Never true    Ran Out of Food in the Last Year: Never true   Transportation Needs: No Transportation Needs    Lack of Transportation (Medical): No    Lack of Transportation (Non-Medical): No   Housing Stability: Unknown    Unstable Housing in the Last Year: No       SURGICAL HISTORY    Past Surgical History:   Procedure Laterality Date    BRONCHOSCOPY N/A 10/08/2020    BRONCHOSCOPY performed by Alexa Barraza MD at [facility]    COLON SURGERY      intussusception    COLONOSCOPY  09/22/2020    Dr. Fabio Mixon with [facility]    TESTICLE SURGERY Right 2011    undecended testicle operation     TYMPANOSTOMY TUBE PLACEMENT  1995       CURRENT MEDICATIONS    Current Outpatient Medications   Medication Sig Dispense Refill    amphetamine-dextroamphetamine (ADDERALL, 30MG,) 30 MG tablet Take 1 tablet by mouth daily for 30 days. Max Daily Amount: 30 mg 30 tablet 0    buPROPion (WELLBUTRIN XL) 150 MG extended release tablet Take 1 tablet by mouth every morning 30 tablet 3    omeprazole (PRILOSEC) 40 MG delayed release capsule Take 1 capsule by mouth every morning (before breakfast) 30 capsule 5    ondansetron (ZOFRAN) 4 MG tablet Take 1 tablet by mouth daily as needed for Nausea or Vomiting 30 tablet 5     No current facility-administered medications for this visit. ALLERGIES    Allergies   Allergen Reactions    Onion Anaphylaxis, Hives and Other (See Comments)    Rondec-D  [Chlophedianol-Pseudoephedrine] Other (See Comments)    Rondec Hives and Nausea And Vomiting       PHYSICAL EXAM   Physical Exam  Vitals and nursing note reviewed. Constitutional:       General: He is not in acute distress. Appearance: He is well-developed. He is not ill-appearing, toxic-appearing or diaphoretic. Interventions: Face mask in place. HENT:      Head: Normocephalic. Right Ear: Hearing normal.      Left Ear: Hearing normal.   Eyes:      General:         Right eye: No discharge. Left eye: No discharge. Pupils: Pupils are equal.   Cardiovascular:      Rate and Rhythm: Normal rate and regular rhythm. Pulses: Normal pulses. Radial pulses are 2+ on the right side and 2+ on the left side.       Heart sounds: Normal heart sounds, S1 normal and S2 normal. No murmur heard. Pulmonary:      Effort: Pulmonary effort is normal. No respiratory distress. Breath sounds: Normal breath sounds. No wheezing. Musculoskeletal:      Cervical back: Normal range of motion. Skin:     General: Skin is warm and dry. Neurological:      Mental Status: He is alert and oriented to person, place, and time. Psychiatric:         Behavior: Behavior normal. Behavior is cooperative. ASSESSMENT/PLAN  1. Attention deficit hyperactivity disorder (ADHD), combined type  -     amphetamine-dextroamphetamine (ADDERALL, 30MG,) 30 MG tablet; Take 1 tablet by mouth daily for 30 days. Max Daily Amount: 30 mg, Disp-30 tablet, R-0Normal  2. Anxiety  3. Dysthymia     ADHD Stable. Would prefer Adderall IR 20 mg in the AM and 10 mg PM is preferred. Will Send over Adderall 30 mg as it is available. Will take 1/2 tab in AM and 1/2 tab in PM  Anxiety and Depression borderline controlled. Will plan to try to d/c Wellbutrin in the future if wanted. Continue working on striving to get more restorative sleep. Zuleima Brown received counseling on the following healthy behaviors: nutrition, exercise, and medication adherence  Reviewed prior labs and health maintenance  Continue current medications, diet and exercise. Discussed use, benefit, and side effects of prescribed medications. Barriers to medication compliance addressed. Patient given educational materials - see patient instructions  Was a self-tracking handout given in paper form or via [employer]? Yes    Requested Prescriptions     Signed Prescriptions Disp Refills    amphetamine-dextroamphetamine (ADDERALL, 30MG,) 30 MG tablet 30 tablet 0     Sig: Take 1 tablet by mouth daily for 30 days. Max Daily Amount: 30 mg       All patient questions answered. Patient voiced understanding. Quality Measures    Body mass index is 27.25 kg/m². Elevated. Weight control planned discussed Healthy diet and regular exercise.     BP: 116/74 Blood pressure is normal. Treatment plan consists of No treatment change needed.     No results found for: LDLCALC, LDLCHOLESTEROL, LDLDIRECT (goal LDL reduction with dx if diabetes is 50% LDL reduction)      PHQ Scores 2/22/2023 2/2/2023 1/26/2023 1/11/2023 11/30/2022 10/19/2022 9/7/2021   PHQ2 Score 2 2 1 3 5 1 0   PHQ9 Score 10 15 7 16 19 5 0     Interpretation of Total Score Depression Severity: 1-4 = Minimal depression, 5-9 = Mild depression, 10-14 = Moderate depression, 15-19 = Moderately severe depression, 20-27 = Severe depression     Return in about 4 months (around 6/22/2023) for Physical.    (Please note that portions of this note were completed with a voice recognition program. Efforts were made to edit the dictations but occasionally words are mis-transcribed.)      Electronically signed by ANDREA Richardson CNP on 2/22/23 at 10:13 AM EST

## 2023-04-27 ENCOUNTER — TELEPHONE (OUTPATIENT)
Dept: FAMILY MEDICINE CLINIC | Age: 28
End: 2023-04-27

## 2023-05-09 DIAGNOSIS — F90.2 ATTENTION DEFICIT HYPERACTIVITY DISORDER (ADHD), COMBINED TYPE: ICD-10-CM

## 2023-05-11 RX ORDER — DEXTROAMPHETAMINE SACCHARATE, AMPHETAMINE ASPARTATE, DEXTROAMPHETAMINE SULFATE AND AMPHETAMINE SULFATE 7.5; 7.5; 7.5; 7.5 MG/1; MG/1; MG/1; MG/1
30 TABLET ORAL DAILY
Qty: 30 TABLET | Refills: 0 | Status: SHIPPED | OUTPATIENT
Start: 2023-05-11 | End: 2023-06-10

## 2023-06-23 DIAGNOSIS — F41.9 ANXIETY: ICD-10-CM

## 2023-06-23 DIAGNOSIS — F34.1 DYSTHYMIA: ICD-10-CM

## 2023-06-24 RX ORDER — BUPROPION HYDROCHLORIDE 150 MG/1
150 TABLET ORAL EVERY MORNING
Qty: 30 TABLET | Refills: 5 | Status: SHIPPED | OUTPATIENT
Start: 2023-06-24

## 2023-07-12 ENCOUNTER — OFFICE VISIT (OUTPATIENT)
Dept: FAMILY MEDICINE CLINIC | Age: 28
End: 2023-07-12
Payer: COMMERCIAL

## 2023-07-12 VITALS
BODY MASS INDEX: 24.49 KG/M2 | SYSTOLIC BLOOD PRESSURE: 120 MMHG | WEIGHT: 197 LBS | RESPIRATION RATE: 15 BRPM | OXYGEN SATURATION: 100 % | TEMPERATURE: 98.4 F | HEIGHT: 75 IN | HEART RATE: 92 BPM | DIASTOLIC BLOOD PRESSURE: 80 MMHG

## 2023-07-12 DIAGNOSIS — F90.2 ATTENTION DEFICIT HYPERACTIVITY DISORDER (ADHD), COMBINED TYPE: Primary | ICD-10-CM

## 2023-07-12 DIAGNOSIS — F41.9 ANXIETY: ICD-10-CM

## 2023-07-12 DIAGNOSIS — Z01.84 IMMUNITY STATUS TESTING: ICD-10-CM

## 2023-07-12 DIAGNOSIS — K58.1 IRRITABLE BOWEL SYNDROME WITH CONSTIPATION: ICD-10-CM

## 2023-07-12 PROCEDURE — 99214 OFFICE O/P EST MOD 30 MIN: CPT | Performed by: NURSE PRACTITIONER

## 2023-07-12 PROCEDURE — G8420 CALC BMI NORM PARAMETERS: HCPCS | Performed by: NURSE PRACTITIONER

## 2023-07-12 PROCEDURE — G8427 DOCREV CUR MEDS BY ELIG CLIN: HCPCS | Performed by: NURSE PRACTITIONER

## 2023-07-12 PROCEDURE — 1036F TOBACCO NON-USER: CPT | Performed by: NURSE PRACTITIONER

## 2023-07-12 RX ORDER — SODIUM FLUORIDE 5 MG/G
CREAM DENTAL
COMMUNITY
Start: 2023-04-05

## 2023-07-12 RX ORDER — DEXTROAMPHETAMINE SACCHARATE, AMPHETAMINE ASPARTATE, DEXTROAMPHETAMINE SULFATE AND AMPHETAMINE SULFATE 7.5; 7.5; 7.5; 7.5 MG/1; MG/1; MG/1; MG/1
TABLET ORAL
Qty: 45 TABLET | Refills: 0 | Status: SHIPPED | OUTPATIENT
Start: 2023-07-12 | End: 2023-08-11

## 2023-07-12 ASSESSMENT — PATIENT HEALTH QUESTIONNAIRE - PHQ9
SUM OF ALL RESPONSES TO PHQ QUESTIONS 1-9: 15
3. TROUBLE FALLING OR STAYING ASLEEP: 3
1. LITTLE INTEREST OR PLEASURE IN DOING THINGS: 1
7. TROUBLE CONCENTRATING ON THINGS, SUCH AS READING THE NEWSPAPER OR WATCHING TELEVISION: 2
5. POOR APPETITE OR OVEREATING: 3
9. THOUGHTS THAT YOU WOULD BE BETTER OFF DEAD, OR OF HURTING YOURSELF: 0
10. IF YOU CHECKED OFF ANY PROBLEMS, HOW DIFFICULT HAVE THESE PROBLEMS MADE IT FOR YOU TO DO YOUR WORK, TAKE CARE OF THINGS AT HOME, OR GET ALONG WITH OTHER PEOPLE: 1
6. FEELING BAD ABOUT YOURSELF - OR THAT YOU ARE A FAILURE OR HAVE LET YOURSELF OR YOUR FAMILY DOWN: 1
2. FEELING DOWN, DEPRESSED OR HOPELESS: 0
SUM OF ALL RESPONSES TO PHQ9 QUESTIONS 1 & 2: 1
4. FEELING TIRED OR HAVING LITTLE ENERGY: 3
SUM OF ALL RESPONSES TO PHQ QUESTIONS 1-9: 15
8. MOVING OR SPEAKING SO SLOWLY THAT OTHER PEOPLE COULD HAVE NOTICED. OR THE OPPOSITE, BEING SO FIGETY OR RESTLESS THAT YOU HAVE BEEN MOVING AROUND A LOT MORE THAN USUAL: 2
SUM OF ALL RESPONSES TO PHQ QUESTIONS 1-9: 15
SUM OF ALL RESPONSES TO PHQ QUESTIONS 1-9: 15

## 2023-07-12 ASSESSMENT — ENCOUNTER SYMPTOMS
EYES NEGATIVE: 1
DIARRHEA: 0
CONSTIPATION: 1
ABDOMINAL PAIN: 1
NAUSEA: 0
BACK PAIN: 1
ABDOMINAL DISTENTION: 1
SHORTNESS OF BREATH: 0

## 2023-07-12 NOTE — PATIENT INSTRUCTIONS
New Updates for My [facility] RADU    Thank you for choosing [facility] to give you the best care! [facility] ([facility]) is always trying to think of new ways to help their patients. We are asking all patients to try out the new digital registration that is now available through the [address] Down load today! . Via the radu you're now able to update your personal and registration information prior to your upcoming appointment. This will save you time once you arrive at the office to check-in, not to mention your information remains safe!! Many other perks come from signing up for an account, such as:  Requesting refills  Scheduling an appointment  Completing an E-Visit  Sending a message to the office/provider  Having access to your medication list  Paying your bill/copay prior to your appointment  Scheduling your yearly mammogram  Review your test results    If you are not familiar the [facility] RADU, please ask one of us and we will be happy to answer any questions or help you set-up your account.

## 2023-08-07 ENCOUNTER — HOSPITAL ENCOUNTER (OUTPATIENT)
Age: 28
Setting detail: SPECIMEN
Discharge: HOME | End: 2023-08-07

## 2023-08-07 DIAGNOSIS — Z01.84 IMMUNITY STATUS TESTING: ICD-10-CM

## 2023-08-07 DIAGNOSIS — R20.0 BILATERAL NUMBNESS AND TINGLING OF ARMS AND LEGS: ICD-10-CM

## 2023-08-07 DIAGNOSIS — Z13.29 SCREENING FOR ENDOCRINE, NUTRITIONAL, METABOLIC AND IMMUNITY DISORDER: ICD-10-CM

## 2023-08-07 DIAGNOSIS — Z13.228 SCREENING FOR ENDOCRINE, NUTRITIONAL, METABOLIC AND IMMUNITY DISORDER: ICD-10-CM

## 2023-08-07 DIAGNOSIS — Z13.220 LIPID SCREENING: ICD-10-CM

## 2023-08-07 DIAGNOSIS — Z13.0 SCREENING FOR ENDOCRINE, NUTRITIONAL, METABOLIC AND IMMUNITY DISORDER: ICD-10-CM

## 2023-08-07 DIAGNOSIS — R20.2 BILATERAL NUMBNESS AND TINGLING OF ARMS AND LEGS: ICD-10-CM

## 2023-08-07 DIAGNOSIS — Z13.21 SCREENING FOR ENDOCRINE, NUTRITIONAL, METABOLIC AND IMMUNITY DISORDER: ICD-10-CM

## 2023-08-07 DIAGNOSIS — Z13.1 DIABETES MELLITUS SCREENING: ICD-10-CM

## 2023-08-07 LAB
ALBUMIN SERPL-MCNC: 5.1 G/DL (ref 3.5–5.2)
ALBUMIN/GLOB SERPL: 1.8 {RATIO} (ref 1–2.5)
ALP SERPL-CCNC: 76 U/L (ref 40–129)
ALT SERPL-CCNC: 15 U/L (ref 5–41)
ANION GAP SERPL CALCULATED.3IONS-SCNC: 15 MMOL/L (ref 9–17)
AST SERPL-CCNC: 18 U/L
BASOPHILS # BLD: <0.03 K/UL (ref 0–0.2)
BASOPHILS NFR BLD: 0 % (ref 0–2)
BILIRUB SERPL-MCNC: 0.2 MG/DL (ref 0.3–1.2)
BUN SERPL-MCNC: 11 MG/DL (ref 6–20)
CALCIUM SERPL-MCNC: 10 MG/DL (ref 8.6–10.4)
CHLORIDE SERPL-SCNC: 103 MMOL/L (ref 98–107)
CHOLEST SERPL-MCNC: 180 MG/DL
CHOLESTEROL/HDL RATIO: 3.9
CO2 SERPL-SCNC: 24 MMOL/L (ref 20–31)
CREAT SERPL-MCNC: 0.9 MG/DL (ref 0.7–1.2)
EOSINOPHIL # BLD: 0.05 K/UL (ref 0–0.44)
EOSINOPHILS RELATIVE PERCENT: 1 % (ref 1–4)
ERYTHROCYTE [DISTWIDTH] IN BLOOD BY AUTOMATED COUNT: 13.9 % (ref 11.8–14.4)
EST. AVERAGE GLUCOSE BLD GHB EST-MCNC: 123 MG/DL
FOLATE SERPL-MCNC: 12.7 NG/ML
GFR SERPL CREATININE-BSD FRML MDRD: >60 ML/MIN/1.73M2
GLUCOSE SERPL-MCNC: 101 MG/DL (ref 70–99)
HBA1C MFR BLD: 5.9 % (ref 4–6)
HCT VFR BLD AUTO: 41 % (ref 40.7–50.3)
HDLC SERPL-MCNC: 46 MG/DL
HGB BLD-MCNC: 13.8 G/DL (ref 13–17)
IMM GRANULOCYTES # BLD AUTO: <0.03 K/UL (ref 0–0.3)
IMM GRANULOCYTES NFR BLD: 0 %
LDLC SERPL CALC-MCNC: 117 MG/DL (ref 0–130)
LYMPHOCYTES NFR BLD: 1.74 K/UL (ref 1.1–3.7)
LYMPHOCYTES RELATIVE PERCENT: 24 % (ref 24–43)
MCH RBC QN AUTO: 30.8 PG (ref 25.2–33.5)
MCHC RBC AUTO-ENTMCNC: 33.7 G/DL (ref 28.4–34.8)
MCV RBC AUTO: 91.5 FL (ref 82.6–102.9)
MONOCYTES NFR BLD: 0.6 K/UL (ref 0.1–1.2)
MONOCYTES NFR BLD: 8 % (ref 3–12)
NEUTROPHILS NFR BLD: 67 % (ref 36–65)
NEUTS SEG NFR BLD: 4.96 K/UL (ref 1.5–8.1)
NRBC BLD-RTO: 0 PER 100 WBC
PLATELET # BLD AUTO: 298 K/UL (ref 138–453)
PMV BLD AUTO: 10.9 FL (ref 8.1–13.5)
POTASSIUM SERPL-SCNC: 4.2 MMOL/L (ref 3.7–5.3)
PROT SERPL-MCNC: 8 G/DL (ref 6.4–8.3)
RBC # BLD AUTO: 4.48 M/UL (ref 4.21–5.77)
RUBV IGG SERPL QL IA: 107.4 IU/ML
SODIUM SERPL-SCNC: 142 MMOL/L (ref 135–144)
T4 FREE SERPL-MCNC: 1.4 NG/DL (ref 0.9–1.7)
TRIGL SERPL-MCNC: 84 MG/DL
TSH SERPL DL<=0.05 MIU/L-ACNC: 0.5 UIU/ML (ref 0.3–5)
VIT B12 SERPL-MCNC: 631 PG/ML (ref 232–1245)
WBC OTHER # BLD: 7.4 K/UL (ref 3.5–11.3)

## 2023-08-09 LAB
MEV IGG SER-ACNC: 1.5
MUV IGG SER IA-ACNC: 0.81

## 2023-08-10 ENCOUNTER — TELEMEDICINE (OUTPATIENT)
Dept: FAMILY MEDICINE CLINIC | Age: 28
End: 2023-08-10
Payer: COMMERCIAL

## 2023-08-10 ENCOUNTER — TELEPHONE (OUTPATIENT)
Dept: FAMILY MEDICINE CLINIC | Age: 28
End: 2023-08-10

## 2023-08-10 DIAGNOSIS — F41.9 ANXIETY: ICD-10-CM

## 2023-08-10 DIAGNOSIS — R73.03 PREDIABETES: Primary | ICD-10-CM

## 2023-08-10 PROCEDURE — G8427 DOCREV CUR MEDS BY ELIG CLIN: HCPCS | Performed by: NURSE PRACTITIONER

## 2023-08-10 PROCEDURE — 99213 OFFICE O/P EST LOW 20 MIN: CPT | Performed by: NURSE PRACTITIONER

## 2023-08-10 PROCEDURE — 1036F TOBACCO NON-USER: CPT | Performed by: NURSE PRACTITIONER

## 2023-08-10 PROCEDURE — G8420 CALC BMI NORM PARAMETERS: HCPCS | Performed by: NURSE PRACTITIONER

## 2023-08-10 RX ORDER — LORAZEPAM 0.5 MG/1
0.5 TABLET ORAL EVERY 8 HOURS PRN
Qty: 30 TABLET | Refills: 0 | Status: SHIPPED | OUTPATIENT
Start: 2023-08-10 | End: 2023-09-09

## 2023-08-10 ASSESSMENT — PATIENT HEALTH QUESTIONNAIRE - PHQ9
9. THOUGHTS THAT YOU WOULD BE BETTER OFF DEAD, OR OF HURTING YOURSELF: 0
7. TROUBLE CONCENTRATING ON THINGS, SUCH AS READING THE NEWSPAPER OR WATCHING TELEVISION: 1
1. LITTLE INTEREST OR PLEASURE IN DOING THINGS: 0
8. MOVING OR SPEAKING SO SLOWLY THAT OTHER PEOPLE COULD HAVE NOTICED. OR THE OPPOSITE, BEING SO FIGETY OR RESTLESS THAT YOU HAVE BEEN MOVING AROUND A LOT MORE THAN USUAL: 0
SUM OF ALL RESPONSES TO PHQ QUESTIONS 1-9: 6
4. FEELING TIRED OR HAVING LITTLE ENERGY: 3
6. FEELING BAD ABOUT YOURSELF - OR THAT YOU ARE A FAILURE OR HAVE LET YOURSELF OR YOUR FAMILY DOWN: 0
SUM OF ALL RESPONSES TO PHQ QUESTIONS 1-9: 6
2. FEELING DOWN, DEPRESSED OR HOPELESS: 0
10. IF YOU CHECKED OFF ANY PROBLEMS, HOW DIFFICULT HAVE THESE PROBLEMS MADE IT FOR YOU TO DO YOUR WORK, TAKE CARE OF THINGS AT HOME, OR GET ALONG WITH OTHER PEOPLE: 1
SUM OF ALL RESPONSES TO PHQ9 QUESTIONS 1 & 2: 0
SUM OF ALL RESPONSES TO PHQ QUESTIONS 1-9: 6
5. POOR APPETITE OR OVEREATING: 0
3. TROUBLE FALLING OR STAYING ASLEEP: 2
SUM OF ALL RESPONSES TO PHQ QUESTIONS 1-9: 6

## 2023-08-10 ASSESSMENT — ENCOUNTER SYMPTOMS
DIARRHEA: 0
ABDOMINAL DISTENTION: 1
BACK PAIN: 1
CONSTIPATION: 1
ABDOMINAL PAIN: 1
NAUSEA: 0
SHORTNESS OF BREATH: 0
EYES NEGATIVE: 1

## 2023-08-10 NOTE — TELEPHONE ENCOUNTER
Patient's labs show that he is in need of an MMR based on his titers. Please verify that we have MMR and try to put patient in for a Monday or Tuesday around the lunch hour for his first injection.     Lastly, please request an immunization record from [facility] or from [facility] as he believes he was given a Tdap at [facility] sometime between 2019 and 2020

## 2023-08-11 LAB
REASON FOR REJECTION: NORMAL
SPECIMEN SOURCE: NORMAL
ZZ NTE CLEAN UP: ORDERED TEST: NORMAL

## 2023-08-27 DIAGNOSIS — F90.2 ATTENTION DEFICIT HYPERACTIVITY DISORDER (ADHD), COMBINED TYPE: ICD-10-CM

## 2023-08-27 DIAGNOSIS — K21.9 GASTROESOPHAGEAL REFLUX DISEASE WITHOUT ESOPHAGITIS: ICD-10-CM

## 2023-08-28 ENCOUNTER — TELEPHONE (OUTPATIENT)
Dept: FAMILY MEDICINE CLINIC | Age: 28
End: 2023-08-28

## 2023-08-28 DIAGNOSIS — Z23 NEED FOR MEASLES-MUMPS-RUBELLA (MMR) VACCINE: ICD-10-CM

## 2023-08-28 DIAGNOSIS — Z23 NEED FOR TETANUS, DIPHTHERIA, AND ACELLULAR PERTUSSIS (TDAP) VACCINE: Primary | ICD-10-CM

## 2023-08-28 NOTE — TELEPHONE ENCOUNTER
Have you seen his form? We called him on 8/10 to schedule him for an MMR and he never called back and got the vaccine so his form could not be signed and probably is not done.

## 2023-08-28 NOTE — TELEPHONE ENCOUNTER
Patient calls in today asking if his paper work for his [employer] has been completed yet? Please advise and route to clinical staff.

## 2023-08-29 ENCOUNTER — NURSE ONLY (OUTPATIENT)
Dept: FAMILY MEDICINE CLINIC | Age: 28
End: 2023-08-29
Payer: COMMERCIAL

## 2023-08-29 VITALS — BODY MASS INDEX: 24.37 KG/M2 | HEART RATE: 104 BPM | WEIGHT: 195 LBS | OXYGEN SATURATION: 98 %

## 2023-08-29 DIAGNOSIS — Z23 NEED FOR MEASLES-MUMPS-RUBELLA (MMR) VACCINE: Primary | ICD-10-CM

## 2023-08-29 PROCEDURE — 90707 MMR VACCINE SC: CPT | Performed by: NURSE PRACTITIONER

## 2023-08-29 PROCEDURE — 90471 IMMUNIZATION ADMIN: CPT | Performed by: NURSE PRACTITIONER

## 2023-08-29 PROCEDURE — 99211 OFF/OP EST MAY X REQ PHY/QHP: CPT | Performed by: NURSE PRACTITIONER

## 2023-08-29 ASSESSMENT — PATIENT HEALTH QUESTIONNAIRE - PHQ9
1. LITTLE INTEREST OR PLEASURE IN DOING THINGS: 0
SUM OF ALL RESPONSES TO PHQ QUESTIONS 1-9: 2
SUM OF ALL RESPONSES TO PHQ QUESTIONS 1-9: 0
5. POOR APPETITE OR OVEREATING: 0
SUM OF ALL RESPONSES TO PHQ9 QUESTIONS 1 & 2: 0
SUM OF ALL RESPONSES TO PHQ QUESTIONS 1-9: 2
SUM OF ALL RESPONSES TO PHQ QUESTIONS 1-9: 0
SUM OF ALL RESPONSES TO PHQ9 QUESTIONS 1 & 2: 0
DEPRESSION UNABLE TO ASSESS: FUNCTIONAL CAPACITY MOTIVATION LIMITS ACCURACY
SUM OF ALL RESPONSES TO PHQ QUESTIONS 1-9: 2
3. TROUBLE FALLING OR STAYING ASLEEP: 1
7. TROUBLE CONCENTRATING ON THINGS, SUCH AS READING THE NEWSPAPER OR WATCHING TELEVISION: 0
1. LITTLE INTEREST OR PLEASURE IN DOING THINGS: 0
SUM OF ALL RESPONSES TO PHQ QUESTIONS 1-9: 2
6. FEELING BAD ABOUT YOURSELF - OR THAT YOU ARE A FAILURE OR HAVE LET YOURSELF OR YOUR FAMILY DOWN: 0
SUM OF ALL RESPONSES TO PHQ QUESTIONS 1-9: 0
10. IF YOU CHECKED OFF ANY PROBLEMS, HOW DIFFICULT HAVE THESE PROBLEMS MADE IT FOR YOU TO DO YOUR WORK, TAKE CARE OF THINGS AT HOME, OR GET ALONG WITH OTHER PEOPLE: 0
9. THOUGHTS THAT YOU WOULD BE BETTER OFF DEAD, OR OF HURTING YOURSELF: 0
8. MOVING OR SPEAKING SO SLOWLY THAT OTHER PEOPLE COULD HAVE NOTICED. OR THE OPPOSITE, BEING SO FIGETY OR RESTLESS THAT YOU HAVE BEEN MOVING AROUND A LOT MORE THAN USUAL: 0
4. FEELING TIRED OR HAVING LITTLE ENERGY: 1
2. FEELING DOWN, DEPRESSED OR HOPELESS: 0
2. FEELING DOWN, DEPRESSED OR HOPELESS: 0
SUM OF ALL RESPONSES TO PHQ QUESTIONS 1-9: 0
DEPRESSION UNABLE TO ASSESS: FUNCTIONAL CAPACITY MOTIVATION LIMITS ACCURACY

## 2023-08-30 RX ORDER — DEXTROAMPHETAMINE SACCHARATE, AMPHETAMINE ASPARTATE, DEXTROAMPHETAMINE SULFATE AND AMPHETAMINE SULFATE 7.5; 7.5; 7.5; 7.5 MG/1; MG/1; MG/1; MG/1
TABLET ORAL
Qty: 45 TABLET | Refills: 0 | Status: SHIPPED | OUTPATIENT
Start: 2023-08-30 | End: 2023-09-29

## 2023-08-30 RX ORDER — OMEPRAZOLE 40 MG/1
40 CAPSULE, DELAYED RELEASE ORAL
Qty: 30 CAPSULE | Refills: 5 | Status: SHIPPED | OUTPATIENT
Start: 2023-08-30

## 2023-10-14 DIAGNOSIS — F90.2 ATTENTION DEFICIT HYPERACTIVITY DISORDER (ADHD), COMBINED TYPE: ICD-10-CM

## 2023-10-17 RX ORDER — DEXTROAMPHETAMINE SACCHARATE, AMPHETAMINE ASPARTATE, DEXTROAMPHETAMINE SULFATE AND AMPHETAMINE SULFATE 7.5; 7.5; 7.5; 7.5 MG/1; MG/1; MG/1; MG/1
TABLET ORAL
Qty: 45 TABLET | Refills: 0 | Status: SHIPPED | OUTPATIENT
Start: 2023-10-17 | End: 2023-11-16

## 2023-11-14 ENCOUNTER — OFFICE VISIT (OUTPATIENT)
Dept: FAMILY MEDICINE CLINIC | Age: 28
End: 2023-11-14
Payer: COMMERCIAL

## 2023-11-14 ENCOUNTER — HOSPITAL ENCOUNTER (OUTPATIENT)
Age: 28
Setting detail: SPECIMEN
Discharge: HOME | End: 2023-11-14

## 2023-11-14 VITALS
HEIGHT: 75 IN | TEMPERATURE: 98.2 F | WEIGHT: 190.8 LBS | DIASTOLIC BLOOD PRESSURE: 80 MMHG | HEART RATE: 80 BPM | RESPIRATION RATE: 16 BRPM | SYSTOLIC BLOOD PRESSURE: 122 MMHG | OXYGEN SATURATION: 98 % | BODY MASS INDEX: 23.72 KG/M2

## 2023-11-14 DIAGNOSIS — K58.1 IRRITABLE BOWEL SYNDROME WITH CONSTIPATION: ICD-10-CM

## 2023-11-14 DIAGNOSIS — K92.1 BLOODY STOOL: ICD-10-CM

## 2023-11-14 DIAGNOSIS — F90.2 ATTENTION DEFICIT HYPERACTIVITY DISORDER (ADHD), COMBINED TYPE: Primary | ICD-10-CM

## 2023-11-14 DIAGNOSIS — R11.2 NAUSEA AND VOMITING, UNSPECIFIED VOMITING TYPE: ICD-10-CM

## 2023-11-14 DIAGNOSIS — K57.90 DIVERTICULOSIS: ICD-10-CM

## 2023-11-14 DIAGNOSIS — R73.03 PREDIABETES: ICD-10-CM

## 2023-11-14 LAB
BASOPHILS # BLD: <0.03 K/UL (ref 0–0.2)
BASOPHILS NFR BLD: 0 % (ref 0–2)
EOSINOPHIL # BLD: 0.08 K/UL (ref 0–0.44)
EOSINOPHILS RELATIVE PERCENT: 1 % (ref 1–4)
ERYTHROCYTE [DISTWIDTH] IN BLOOD BY AUTOMATED COUNT: 13.8 % (ref 11.8–14.4)
HBA1C MFR BLD: 5.5 %
HCT VFR BLD AUTO: 41.1 % (ref 40.7–50.3)
HGB BLD-MCNC: 13.3 G/DL (ref 13–17)
IMM GRANULOCYTES # BLD AUTO: <0.03 K/UL (ref 0–0.3)
IMM GRANULOCYTES NFR BLD: 0 %
LYMPHOCYTES NFR BLD: 2.65 K/UL (ref 1.1–3.7)
LYMPHOCYTES RELATIVE PERCENT: 39 % (ref 24–43)
MCH RBC QN AUTO: 30.2 PG (ref 25.2–33.5)
MCHC RBC AUTO-ENTMCNC: 32.4 G/DL (ref 28.4–34.8)
MCV RBC AUTO: 93.2 FL (ref 82.6–102.9)
MONOCYTES NFR BLD: 0.53 K/UL (ref 0.1–1.2)
MONOCYTES NFR BLD: 8 % (ref 3–12)
NEUTROPHILS NFR BLD: 52 % (ref 36–65)
NEUTS SEG NFR BLD: 3.57 K/UL (ref 1.5–8.1)
NRBC BLD-RTO: 0 PER 100 WBC
PLATELET # BLD AUTO: 305 K/UL (ref 138–453)
PMV BLD AUTO: 10.5 FL (ref 8.1–13.5)
RBC # BLD AUTO: 4.41 M/UL (ref 4.21–5.77)
WBC OTHER # BLD: 6.9 K/UL (ref 3.5–11.3)

## 2023-11-14 PROCEDURE — G8427 DOCREV CUR MEDS BY ELIG CLIN: HCPCS | Performed by: NURSE PRACTITIONER

## 2023-11-14 PROCEDURE — G8420 CALC BMI NORM PARAMETERS: HCPCS | Performed by: NURSE PRACTITIONER

## 2023-11-14 PROCEDURE — G8484 FLU IMMUNIZE NO ADMIN: HCPCS | Performed by: NURSE PRACTITIONER

## 2023-11-14 PROCEDURE — 1036F TOBACCO NON-USER: CPT | Performed by: NURSE PRACTITIONER

## 2023-11-14 PROCEDURE — 83036 HEMOGLOBIN GLYCOSYLATED A1C: CPT | Performed by: NURSE PRACTITIONER

## 2023-11-14 PROCEDURE — 99213 OFFICE O/P EST LOW 20 MIN: CPT | Performed by: NURSE PRACTITIONER

## 2023-11-14 RX ORDER — ONDANSETRON 4 MG/1
4 TABLET, FILM COATED ORAL DAILY PRN
Qty: 30 TABLET | Refills: 5 | Status: SHIPPED | OUTPATIENT
Start: 2023-11-14

## 2023-11-14 ASSESSMENT — COLUMBIA-SUICIDE SEVERITY RATING SCALE - C-SSRS
4. HAVE YOU HAD THESE THOUGHTS AND HAD SOME INTENTION OF ACTING ON THEM?: NO
7. DID THIS OCCUR IN THE LAST THREE MONTHS: NO
3. HAVE YOU BEEN THINKING ABOUT HOW YOU MIGHT KILL YOURSELF?: NO
5. HAVE YOU STARTED TO WORK OUT OR WORKED OUT THE DETAILS OF HOW TO KILL YOURSELF? DO YOU INTEND TO CARRY OUT THIS PLAN?: NO

## 2023-11-14 ASSESSMENT — PATIENT HEALTH QUESTIONNAIRE - PHQ9
2. FEELING DOWN, DEPRESSED OR HOPELESS: 2
10. IF YOU CHECKED OFF ANY PROBLEMS, HOW DIFFICULT HAVE THESE PROBLEMS MADE IT FOR YOU TO DO YOUR WORK, TAKE CARE OF THINGS AT HOME, OR GET ALONG WITH OTHER PEOPLE: 1
SUM OF ALL RESPONSES TO PHQ QUESTIONS 1-9: 13
6. FEELING BAD ABOUT YOURSELF - OR THAT YOU ARE A FAILURE OR HAVE LET YOURSELF OR YOUR FAMILY DOWN: 1
SUM OF ALL RESPONSES TO PHQ QUESTIONS 1-9: 13
3. TROUBLE FALLING OR STAYING ASLEEP: 3
SUM OF ALL RESPONSES TO PHQ9 QUESTIONS 1 & 2: 3
4. FEELING TIRED OR HAVING LITTLE ENERGY: 1
SUM OF ALL RESPONSES TO PHQ QUESTIONS 1-9: 13
7. TROUBLE CONCENTRATING ON THINGS, SUCH AS READING THE NEWSPAPER OR WATCHING TELEVISION: 2
SUM OF ALL RESPONSES TO PHQ QUESTIONS 1-9: 13
8. MOVING OR SPEAKING SO SLOWLY THAT OTHER PEOPLE COULD HAVE NOTICED. OR THE OPPOSITE, BEING SO FIGETY OR RESTLESS THAT YOU HAVE BEEN MOVING AROUND A LOT MORE THAN USUAL: 1
9. THOUGHTS THAT YOU WOULD BE BETTER OFF DEAD, OR OF HURTING YOURSELF: 0
5. POOR APPETITE OR OVEREATING: 2
1. LITTLE INTEREST OR PLEASURE IN DOING THINGS: 1

## 2023-11-14 ASSESSMENT — ENCOUNTER SYMPTOMS
NAUSEA: 1
SHORTNESS OF BREATH: 0
CONSTIPATION: 0
COUGH: 0
ABDOMINAL PAIN: 1
ALLERGIC/IMMUNOLOGIC NEGATIVE: 1
EYES NEGATIVE: 1
BLOOD IN STOOL: 1
RESPIRATORY NEGATIVE: 1
RECTAL PAIN: 0
VOMITING: 0

## 2023-11-14 NOTE — PATIENT INSTRUCTIONS
[facility] [address], [address]  [phone]               New Updates for [facility] RADU    Thank you for choosing [facility] to give you the best care! [address] [place] [address] is always trying to think of new ways to help their patients. We are asking all patients to try out the new digital registration that is now available through the [address] Down load today! . Via the radu you're now able to update your personal and registration information prior to your upcoming appointment. This will save you time once you arrive at the office to check-in, not to mention your information remains safe!! Many other perks come from signing up for an account, such as:  Requesting refills  Scheduling an appointment  Completing an E-Visit  Sending a message to the office/provider  Having access to your medication list  Paying your bill/copay prior to your appointment  Scheduling your yearly mammogram  Review your test results    If you are not familiar the [facility] RADU, please ask one of us and we will be happy to answer any questions or help you set-up your account.

## 2023-12-01 ENCOUNTER — TELEPHONE (OUTPATIENT)
Dept: FAMILY MEDICINE CLINIC | Age: 28
End: 2023-12-01

## 2023-12-01 NOTE — TELEPHONE ENCOUNTER
CORDELIA for Rise Dear to call the office. Does he need a Tdap and TB vaccine? He had one MMR vaccine in August, does he need a second one? Forms are in folder for Dr. Mark Thomson to look at.

## 2023-12-05 DIAGNOSIS — F90.2 ATTENTION DEFICIT HYPERACTIVITY DISORDER (ADHD), COMBINED TYPE: ICD-10-CM

## 2023-12-05 RX ORDER — DEXTROAMPHETAMINE SACCHARATE, AMPHETAMINE ASPARTATE, DEXTROAMPHETAMINE SULFATE AND AMPHETAMINE SULFATE 7.5; 7.5; 7.5; 7.5 MG/1; MG/1; MG/1; MG/1
TABLET ORAL
Qty: 45 TABLET | Refills: 0 | Status: SHIPPED | OUTPATIENT
Start: 2023-12-05 | End: 2024-01-04

## 2023-12-08 ENCOUNTER — TELEPHONE (OUTPATIENT)
Dept: FAMILY MEDICINE CLINIC | Age: 28
End: 2023-12-08

## 2023-12-08 NOTE — TELEPHONE ENCOUNTER
SOL for Rise Dear to call the office. The forms can't be completed until we know the answers about the Tdap vaccine & TB testing    Sent [employer] message to the patient. Does he need a Tdap and TB vaccine? He had one MMR vaccine in August, does he need a second one? Forms are in Malgorzata's red folder in the bottom drawer.

## 2024-02-15 NOTE — PROGRESS NOTES
MEDICARE WELLNESS VISIT + NOTE    CHIEF COMPLAINT:  Frida presents for her Subsequent Annual Medicare Wellness Visit.   Her additional complaints or concerns are addressed below.      Patient Care Team:  Kathia Archuleta DO as PCP - General (Family Practice)        Patient Active Problem List   Diagnosis    Encounter for Medicare annual wellness exam    Vulvovaginitis    Hypothyroidism    Mixed hyperlipidemia    SANA (generalized anxiety disorder)    Need for hepatitis C screening test    Elevated blood-pressure reading without diagnosis of hypertension         Past Medical History:   Diagnosis Date    Acid reflux     Anxiety     Arthritis     Depressive disorder     High cholesterol     Thyroid disease          Past Surgical History:   Procedure Laterality Date    Hysterectomy      Removal of tonsils,<9 y/o           Social History     Tobacco Use    Smoking status: Never     Passive exposure: Never    Smokeless tobacco: Never   Vaping Use    Vaping Use: never used   Substance Use Topics    Alcohol use: Never    Drug use: Never     Family History   Problem Relation Age of Onset    Dementia/Alzheimers Mother     Heart disease Father     Heart disease Brother          Current Outpatient Medications   Medication Sig Dispense Refill    clotrimazole (LOTRIMIN) 1 % vaginal cream Place 1 applicator vaginally.      Senexon-S 8.6-50 MG per tablet Take 1 tablet by mouth daily.      fluconazole (DIFLUCAN) 150 MG tablet Take 1 tablet by mouth 1 time for 1 dose. 1 tablet 0    clotrimazole-betamethasone (LOTRISONE) 1-0.05 % cream Apply topically 2 times daily. 30 g 0    levothyroxine 50 MCG tablet Take 1 tablet by mouth daily. 90 tablet 3    atorvastatin (LIPITOR) 10 MG tablet Take 1 tablet by mouth daily. 90 tablet 3    LORazepam (ATIVAN) 0.5 MG tablet Take 1 tablet by mouth every 8 hours as needed for Anxiety. 30 tablet 0    Blood Pressure Monitoring (Blood Pressure Cuff) Misc Check BP 2x/day 1 each 1    hydroCORTisone  Patient presents for follow up abdominal pain    Pharmacy verified (ANUSOL-HC) 2.5 % rectal cream       pantoprazole (PROTONIX) 40 MG tablet       dicyclomine (BENTYL) 10 MG capsule 10 mg 4 times daily (before meals and nightly).      QUEtiapine (SEROquel) 50 MG tablet Take 50 mg by mouth daily.      famotidine (PEPCID) 40 MG tablet TAKE 30 MINUTES BEFORE BREAKFAST AND DINNER       No current facility-administered medications for this visit.        The following items on the Medicare Health Risk Assessment were found to be positive  1.) Do you have an Advance directive, living will, or power of  for health care document that contains your wishes for end of life care?: No     2.) Would you like additional information on advance directives?: Yes     6 a.) How many servings of Fruits and Vegetables do you have each day ( 1 serving = 1 piece of fruit, 1/2 cup fruits or vegetables): 1 per day     6 b.) How many servings of High Fiber / Whole Grain Foods to you have each day ( 1 serving = 1 cup cold cereal, 1/2 cup cooked cereal, 1 slice bread): 1 per day         Vision and Hearing screens: No results found.    Advance care planning documents on file - no    Cognitive/Functional Status: no evidence of cognitive dysfunction by direct observation    Opioid Review: Frida is not taking opioid medications.    Recent PHQ 2/9 Score:    PHQ 2:  PHQ 2 Score Adult PHQ 2 Score Adult PHQ 2 Interpretation Little interest or pleasure in activity?   2/15/2024  10:58 AM 2 No further screening needed 1       PHQ 9:  PHQ 9 Score Adult PHQ 9 Score Adult PHQ 9 Interpretation   2/15/2024  10:58 AM 7 Mild Depression       DEPRESSION ASSESSMENT/PLAN:  Mild symptoms, will monitor and reevaluate.     Body mass index is 19.55 kg/m².    BMI ASSESSMENT/PLAN:  Patient BMI is within normal range.    See Patient Instructions section.   Return in about 2 months (around 4/15/2024) for Follow up Anxiety.      OUTPATIENT PROGRESS NOTE    Subjective   Chief Complaint Medicare Wellness Visit (Patient here for MWV  ) and Yeast Infection (Patient c/o having vaginal discharge x2 weeks)    HPI       Hypothyroid: She is taking Levothyroxine 50mcg daily. She is complaint with her medications.     HLD: She is taking atorvastatin 10mg daily. She is compliant with her medications.   Diet- \"not too good\"- she eats 1-2 meals a day. She is not always hungry- she can't eat a lot of things. She tries not to eat late (after 6pm). She eats home cooked meals. She eats veggies  Exercise- no formal exercise. She tries to keep active.     Yeast infection: She is concerned about a yeas infection for the past 2 weeks. She has been having a lot of vaginal discharge and itching of vulva area. She has not been sexually active in over 8 years. She is followed by OBGYN and was given a topical cream, but it did not help. She was told to switch to showers.     Anxiety: She is taking Lorazepam 1mg TID for the past 2 years. She is taking it all the time- she feels like she can't go without. She is also taking Seroquel 50mg daily.   She lives alone. Her friend lives nearby. She does drive. Sh eis able to all her ADLS. She cooks and cleans for herself. She has 2 cats- her companions.   SANA-7 score of 4  PHQ-9 score of 7    Medications  Medications were reviewed and updated today.    Histories  I have personally reviewed and updated the patient's past medical, past surgical, family and social histories during today's visit.    Review of Systems   Constitutional: Negative.    Respiratory: Negative.  Negative for shortness of breath.    Cardiovascular: Negative.  Negative for chest pain.   Gastrointestinal: Negative.  Negative for blood in stool, constipation and diarrhea.   Genitourinary:  Positive for vaginal discharge. Negative for dysuria and hematuria.   Neurological: Negative.    Psychiatric/Behavioral:  Positive for sleep disturbance. The patient is nervous/anxious.    All other systems reviewed and are negative.      Objective   Visit Vitals  BP (!)  167/68 (BP Location: RUE - Right upper extremity, Patient Position: Sitting, Cuff Size: Regular)   Pulse 72   Temp 97.1 °F (36.2 °C) (Tympanic)   Resp 17   Ht 5' (1.524 m)   Wt 45.4 kg (100 lb 1.4 oz)   LMP  (LMP Unknown)   SpO2 99%   BMI 19.55 kg/m²     Physical Exam  Constitutional:       General: She is not in acute distress.     Appearance: Normal appearance. She is not ill-appearing, toxic-appearing or diaphoretic.   HENT:      Mouth/Throat:      Mouth: Mucous membranes are moist.      Pharynx: Oropharynx is clear.   Eyes:      Conjunctiva/sclera: Conjunctivae normal.      Pupils: Pupils are equal, round, and reactive to light.   Cardiovascular:      Rate and Rhythm: Normal rate and regular rhythm.      Pulses: Normal pulses.      Heart sounds: Normal heart sounds. No murmur heard.     No friction rub. No gallop.   Pulmonary:      Effort: Pulmonary effort is normal. No respiratory distress.      Breath sounds: Normal breath sounds. No wheezing or rhonchi.   Abdominal:      General: Bowel sounds are normal. There is no distension.      Palpations: Abdomen is soft.      Tenderness: There is no abdominal tenderness. There is no guarding or rebound.   Musculoskeletal:         General: Normal range of motion.      Right lower leg: No edema.      Left lower leg: No edema.   Skin:     General: Skin is warm and dry.      Findings: No rash.   Neurological:      General: No focal deficit present.      Mental Status: She is alert and oriented to person, place, and time.   Psychiatric:         Mood and Affect: Mood normal.         Behavior: Behavior normal.           Assessment & Plan   Problem List Items Addressed This Visit          Cardiac and Vasculature    Mixed hyperlipidemia     - will check lipid panel today  - Continue Atorvastatin 10mg daily, refill sent to pharmacy  - Recommend low fat diet and regular exercise to help maintain normal body weight and improve lipid balance.            Relevant Medications     atorvastatin (LIPITOR) 10 MG tablet    Other Relevant Orders    Lipid Panel With Reflex    Elevated blood-pressure reading without diagnosis of hypertension     - BP elevated today 167/68  - Patient asymptomatic  - Rx for home BP cuff provided today  - Instructed patient to keep a daily home BP log to bring to next visit, log provided  - Recommend low salt diet and cutting down on caffeine intake to help with BP control  - Follow up in 6-8weeks for BP check           Relevant Medications    Blood Pressure Monitoring (Blood Pressure Cuff) Misc       Endocrine and Metabolic    Hypothyroidism     - will check TSH level  - Continue Levothyroxine 50mcg daily, refill sent to pharmacy          Relevant Medications    levothyroxine 50 MCG tablet    Other Relevant Orders    Thyroid Stimulating Hormone Reflex       Genitourinary and Reproductive    Vulvovaginitis     - Likely 2/2 yeast infection   - Pelvic exam deferred by patient  - Start oral antifungal Fluconazole 150mg po ONCE + topical clotrimazole-betamethasone cream apply BID x 7days, Rx sent to pharmacy  - Discussed vaginitis prevention with patient today- avoiding scented soaps and bubble baths, wearing cotton underwear and avoiding excessive moisture and sweat in groin area  - RTC if symptoms worsen or persist           Relevant Medications    fluconazole (DIFLUCAN) 150 MG tablet    clotrimazole-betamethasone (LOTRISONE) 1-0.05 % cream       Health Encounters    Encounter for Medicare annual wellness exam - Primary     Advance Care Planning Addressed: Advance Directives. Advance Care Planning discussed with patient.   Handouts (given to patient): Written handout given.   Health Maintenance (discussed and/or reviewed):   Patient Education  Medication needs  Social Concerns  Patient does not require behavioral or case management referrals or other disease specific interventions  Patient Education (taught and/or recommended):  Patient educated on disease process,  etiology & prognosis  Proper usage and side effects of medications reviewed & discussed  Medical compliance with plan discussed and risks of non-compliance reviewed  Return to clinic as clinically indicated as discussed with patient who verbalized understanding of & agreement with the plan           Relevant Orders    CBC with Automated Differential    Comprehensive Metabolic Panel    Glycohemoglobin    Lipid Panel With Reflex       Infectious Diseases    Need for hepatitis C screening test     - will check Hepatitis C screen         Relevant Orders    Hepatitis C Antibody With Reflex       Mental Health    SANA (generalized anxiety disorder)     - stable  - SANA-7 score of 4 and PHQ-9 score of 7  - Continue Seroquel 50mg daily   - Discussed with patient that long term benzodiazepines can be dangerous in elderly population. Recommend weaning down off lorazepam over the next 6 months to which patient is agreeable  - Discontinue Lorazepan 1mg tablets  - Start Lorazepam 0.5mg TID PRN for anxiety, Rx sent to pharmacy (okay for one additional refill). Will plan to wean down to BID and then daily and then stop  - Will give referral to Psychiatry for further evaluation and treatment          Relevant Medications    LORazepam (ATIVAN) 0.5 MG tablet    Other Relevant Orders    SERVICE TO BEHAVIORAL HEALTH     Other Visit Diagnoses       Encounter to establish care                  Return in about 2 months (around 4/15/2024) for Follow up Anxiety.      Kathia Archuleta DO  Family Medicine Physician

## 2024-06-24 ENCOUNTER — OFFICE VISIT (OUTPATIENT)
Dept: FAMILY MEDICINE CLINIC | Age: 29
End: 2024-06-24
Payer: COMMERCIAL

## 2024-06-24 VITALS
WEIGHT: 225.6 LBS | HEART RATE: 88 BPM | DIASTOLIC BLOOD PRESSURE: 83 MMHG | SYSTOLIC BLOOD PRESSURE: 124 MMHG | BODY MASS INDEX: 28.2 KG/M2

## 2024-06-24 DIAGNOSIS — R73.03 PREDIABETES: ICD-10-CM

## 2024-06-24 DIAGNOSIS — Z13.228 SCREENING FOR ENDOCRINE, NUTRITIONAL, METABOLIC AND IMMUNITY DISORDER: ICD-10-CM

## 2024-06-24 DIAGNOSIS — R20.2 NUMBNESS AND TINGLING OF BOTH LEGS BELOW KNEES: ICD-10-CM

## 2024-06-24 DIAGNOSIS — Z13.29 SCREENING FOR ENDOCRINE, NUTRITIONAL, METABOLIC AND IMMUNITY DISORDER: ICD-10-CM

## 2024-06-24 DIAGNOSIS — R20.0 RIGHT ARM NUMBNESS: ICD-10-CM

## 2024-06-24 DIAGNOSIS — K58.1 IRRITABLE BOWEL SYNDROME WITH CONSTIPATION: ICD-10-CM

## 2024-06-24 DIAGNOSIS — Z13.220 LIPID SCREENING: ICD-10-CM

## 2024-06-24 DIAGNOSIS — Z13.21 SCREENING FOR ENDOCRINE, NUTRITIONAL, METABOLIC AND IMMUNITY DISORDER: ICD-10-CM

## 2024-06-24 DIAGNOSIS — Z13.0 SCREENING FOR ENDOCRINE, NUTRITIONAL, METABOLIC AND IMMUNITY DISORDER: ICD-10-CM

## 2024-06-24 DIAGNOSIS — K21.9 GASTROESOPHAGEAL REFLUX DISEASE WITHOUT ESOPHAGITIS: ICD-10-CM

## 2024-06-24 DIAGNOSIS — F41.9 ANXIETY: ICD-10-CM

## 2024-06-24 DIAGNOSIS — F34.1 DYSTHYMIA: ICD-10-CM

## 2024-06-24 DIAGNOSIS — R11.2 NAUSEA AND VOMITING, UNSPECIFIED VOMITING TYPE: ICD-10-CM

## 2024-06-24 DIAGNOSIS — F90.2 ATTENTION DEFICIT HYPERACTIVITY DISORDER (ADHD), COMBINED TYPE: Primary | ICD-10-CM

## 2024-06-24 DIAGNOSIS — R20.0 NUMBNESS AND TINGLING OF BOTH LEGS BELOW KNEES: ICD-10-CM

## 2024-06-24 PROCEDURE — 1036F TOBACCO NON-USER: CPT | Performed by: NURSE PRACTITIONER

## 2024-06-24 PROCEDURE — G8419 CALC BMI OUT NRM PARAM NOF/U: HCPCS | Performed by: NURSE PRACTITIONER

## 2024-06-24 PROCEDURE — G8427 DOCREV CUR MEDS BY ELIG CLIN: HCPCS | Performed by: NURSE PRACTITIONER

## 2024-06-24 PROCEDURE — 99214 OFFICE O/P EST MOD 30 MIN: CPT | Performed by: NURSE PRACTITIONER

## 2024-06-24 RX ORDER — ONDANSETRON 4 MG/1
4 TABLET, ORALLY DISINTEGRATING ORAL 3 TIMES DAILY PRN
Qty: 60 TABLET | Refills: 2 | Status: SHIPPED | OUTPATIENT
Start: 2024-06-24

## 2024-06-24 RX ORDER — OMEPRAZOLE 40 MG/1
40 CAPSULE, DELAYED RELEASE ORAL
Qty: 90 CAPSULE | Refills: 1 | Status: SHIPPED | OUTPATIENT
Start: 2024-06-24 | End: 2024-06-25 | Stop reason: SDUPTHER

## 2024-06-24 RX ORDER — BUPROPION HYDROCHLORIDE 150 MG/1
150 TABLET ORAL EVERY MORNING
Qty: 30 TABLET | Refills: 5 | Status: SHIPPED | OUTPATIENT
Start: 2024-06-24 | End: 2024-06-24

## 2024-06-24 RX ORDER — DEXTROAMPHETAMINE SACCHARATE, AMPHETAMINE ASPARTATE, DEXTROAMPHETAMINE SULFATE AND AMPHETAMINE SULFATE 7.5; 7.5; 7.5; 7.5 MG/1; MG/1; MG/1; MG/1
TABLET ORAL
Qty: 45 TABLET | Refills: 0 | Status: SHIPPED | OUTPATIENT
Start: 2024-06-24 | End: 2024-06-24 | Stop reason: SDUPTHER

## 2024-06-24 RX ORDER — DEXTROAMPHETAMINE SACCHARATE, AMPHETAMINE ASPARTATE, DEXTROAMPHETAMINE SULFATE AND AMPHETAMINE SULFATE 7.5; 7.5; 7.5; 7.5 MG/1; MG/1; MG/1; MG/1
TABLET ORAL
Qty: 45 TABLET | Refills: 0 | Status: SHIPPED | OUTPATIENT
Start: 2024-07-24 | End: 2024-06-24

## 2024-06-24 RX ORDER — LORAZEPAM 0.5 MG/1
0.5 TABLET ORAL EVERY 8 HOURS PRN
Qty: 30 TABLET | Refills: 0 | Status: SHIPPED | OUTPATIENT
Start: 2024-06-24 | End: 2024-07-24

## 2024-06-24 RX ORDER — BUPROPION HYDROCHLORIDE 150 MG/1
150 TABLET ORAL EVERY MORNING
Qty: 90 TABLET | Refills: 1 | Status: SHIPPED | OUTPATIENT
Start: 2024-06-24 | End: 2024-06-25 | Stop reason: SDUPTHER

## 2024-06-24 RX ORDER — DEXTROAMPHETAMINE SACCHARATE, AMPHETAMINE ASPARTATE, DEXTROAMPHETAMINE SULFATE AND AMPHETAMINE SULFATE 7.5; 7.5; 7.5; 7.5 MG/1; MG/1; MG/1; MG/1
TABLET ORAL
Qty: 45 TABLET | Refills: 0 | Status: SHIPPED | OUTPATIENT
Start: 2024-08-24 | End: 2024-09-23

## 2024-06-24 SDOH — ECONOMIC STABILITY: FOOD INSECURITY: WITHIN THE PAST 12 MONTHS, YOU WORRIED THAT YOUR FOOD WOULD RUN OUT BEFORE YOU GOT MONEY TO BUY MORE.: NEVER TRUE

## 2024-06-24 SDOH — ECONOMIC STABILITY: FOOD INSECURITY: WITHIN THE PAST 12 MONTHS, THE FOOD YOU BOUGHT JUST DIDN'T LAST AND YOU DIDN'T HAVE MONEY TO GET MORE.: NEVER TRUE

## 2024-06-24 SDOH — ECONOMIC STABILITY: INCOME INSECURITY: HOW HARD IS IT FOR YOU TO PAY FOR THE VERY BASICS LIKE FOOD, HOUSING, MEDICAL CARE, AND HEATING?: NOT HARD AT ALL

## 2024-06-24 ASSESSMENT — PATIENT HEALTH QUESTIONNAIRE - PHQ9
SUM OF ALL RESPONSES TO PHQ QUESTIONS 1-9: 15
8. MOVING OR SPEAKING SO SLOWLY THAT OTHER PEOPLE COULD HAVE NOTICED. OR THE OPPOSITE - BEING SO FIDGETY OR RESTLESS THAT YOU HAVE BEEN MOVING AROUND A LOT MORE THAN USUAL: NOT AT ALL
9. THOUGHTS THAT YOU WOULD BE BETTER OFF DEAD, OR OF HURTING YOURSELF: NOT AT ALL
5. POOR APPETITE OR OVEREATING: NEARLY EVERY DAY
SUM OF ALL RESPONSES TO PHQ9 QUESTIONS 1 & 2: 4
4. FEELING TIRED OR HAVING LITTLE ENERGY: MORE THAN HALF THE DAYS
10. IF YOU CHECKED OFF ANY PROBLEMS, HOW DIFFICULT HAVE THESE PROBLEMS MADE IT FOR YOU TO DO YOUR WORK, TAKE CARE OF THINGS AT HOME, OR GET ALONG WITH OTHER PEOPLE: EXTREMELY DIFFICULT
6. FEELING BAD ABOUT YOURSELF - OR THAT YOU ARE A FAILURE OR HAVE LET YOURSELF OR YOUR FAMILY DOWN: MORE THAN HALF THE DAYS
SUM OF ALL RESPONSES TO PHQ QUESTIONS 1-9: 15
7. TROUBLE CONCENTRATING ON THINGS, SUCH AS READING THE NEWSPAPER OR WATCHING TELEVISION: MORE THAN HALF THE DAYS
10. IF YOU CHECKED OFF ANY PROBLEMS, HOW DIFFICULT HAVE THESE PROBLEMS MADE IT FOR YOU TO DO YOUR WORK, TAKE CARE OF THINGS AT HOME, OR GET ALONG WITH OTHER PEOPLE: EXTREMELY DIFFICULT
7. TROUBLE CONCENTRATING ON THINGS, SUCH AS READING THE NEWSPAPER OR WATCHING TELEVISION: MORE THAN HALF THE DAYS
SUM OF ALL RESPONSES TO PHQ QUESTIONS 1-9: 15
3. TROUBLE FALLING OR STAYING ASLEEP: MORE THAN HALF THE DAYS
2. FEELING DOWN, DEPRESSED OR HOPELESS: SEVERAL DAYS
5. POOR APPETITE OR OVEREATING: NEARLY EVERY DAY
SUM OF ALL RESPONSES TO PHQ QUESTIONS 1-9: 15
SUM OF ALL RESPONSES TO PHQ QUESTIONS 1-9: 15
8. MOVING OR SPEAKING SO SLOWLY THAT OTHER PEOPLE COULD HAVE NOTICED. OR THE OPPOSITE, BEING SO FIGETY OR RESTLESS THAT YOU HAVE BEEN MOVING AROUND A LOT MORE THAN USUAL: NOT AT ALL
9. THOUGHTS THAT YOU WOULD BE BETTER OFF DEAD, OR OF HURTING YOURSELF: NOT AT ALL
4. FEELING TIRED OR HAVING LITTLE ENERGY: MORE THAN HALF THE DAYS
6. FEELING BAD ABOUT YOURSELF - OR THAT YOU ARE A FAILURE OR HAVE LET YOURSELF OR YOUR FAMILY DOWN: MORE THAN HALF THE DAYS
3. TROUBLE FALLING OR STAYING ASLEEP: MORE THAN HALF THE DAYS
2. FEELING DOWN, DEPRESSED OR HOPELESS: SEVERAL DAYS
1. LITTLE INTEREST OR PLEASURE IN DOING THINGS: NEARLY EVERY DAY
1. LITTLE INTEREST OR PLEASURE IN DOING THINGS: NEARLY EVERY DAY

## 2024-06-24 ASSESSMENT — ENCOUNTER SYMPTOMS
RECTAL PAIN: 0
COUGH: 0
VOMITING: 0
RESPIRATORY NEGATIVE: 1
NAUSEA: 1
CONSTIPATION: 0
EYES NEGATIVE: 1
BLOOD IN STOOL: 0
ALLERGIC/IMMUNOLOGIC NEGATIVE: 1
ABDOMINAL PAIN: 1
SHORTNESS OF BREATH: 0

## 2024-06-24 NOTE — PROGRESS NOTES
Transportation (Non-Medical): No   Housing Stability: Unknown (6/24/2024)    Housing Stability Vital Sign     Unstable Housing in the Last Year: No       SURGICAL HISTORY    Past Surgical History:   Procedure Laterality Date    BRONCHOSCOPY N/A 10/08/2020    BRONCHOSCOPY performed by Romulo Del Real MD at [facility]    COLON SURGERY      intussusception    COLONOSCOPY  09/22/2020    Dr. Benoit with [facility]    TESTICLE SURGERY Right 2011    undecended testicle operation     TYMPANOSTOMY TUBE PLACEMENT  1995       CURRENT MEDICATIONS    Current Outpatient Medications   Medication Sig Dispense Refill    LORazepam (ATIVAN) 0.5 MG tablet Take 1 tablet by mouth every 8 hours as needed for Anxiety for up to 30 days. 30 tablet 0    omeprazole (PRILOSEC) 40 MG delayed release capsule Take 1 capsule by mouth every morning (before breakfast) 90 capsule 1    buPROPion (WELLBUTRIN XL) 150 MG extended release tablet Take 1 tablet by mouth every morning 90 tablet 1    ondansetron (ZOFRAN-ODT) 4 MG disintegrating tablet Take 1 tablet by mouth 3 times daily as needed for Nausea or Vomiting 60 tablet 2    [START ON 8/24/2024] amphetamine-dextroamphetamine (ADDERALL, 30MG,) 30 MG tablet Take 1 tablet by mouth daily AND 0.5 tablets Daily with lunch. Do all this for 30 days. Max Daily Amount: 45 mg. 45 tablet 0    ondansetron (ZOFRAN) 4 MG tablet Take 1 tablet by mouth daily as needed for Nausea or Vomiting 30 tablet 5    amphetamine-dextroamphetamine (ADDERALL, 30MG,) 30 MG tablet Take 1 tablet by mouth daily AND 0.5 tablets Daily with lunch. Do all this for 30 days. Max Daily Amount: 45 mg. 45 tablet 0    SODIUM FLUORIDE 5000 PLUS 1.1 % CREA APPLY PEA SIZED AMOUNT AND THEN BRUSH TEETH FOR 2 MINUTES TWICE DAILY      methylcellulose 500 MG TABS Two caplets as needed up to 6 times/day for constipation; maximum: 12 caplets/day. 60 tablet 1     No current facility-administered medications for this visit.       ALLERGIES    Allergies

## 2024-06-25 ENCOUNTER — TELEPHONE (OUTPATIENT)
Dept: FAMILY MEDICINE CLINIC | Age: 29
End: 2024-06-25

## 2024-06-25 DIAGNOSIS — R11.2 NAUSEA AND VOMITING, UNSPECIFIED VOMITING TYPE: ICD-10-CM

## 2024-06-25 DIAGNOSIS — F34.1 DYSTHYMIA: ICD-10-CM

## 2024-06-25 DIAGNOSIS — F41.9 ANXIETY: ICD-10-CM

## 2024-06-25 DIAGNOSIS — K21.9 GASTROESOPHAGEAL REFLUX DISEASE WITHOUT ESOPHAGITIS: ICD-10-CM

## 2024-06-25 RX ORDER — OMEPRAZOLE 40 MG/1
40 CAPSULE, DELAYED RELEASE ORAL
Qty: 30 CAPSULE | Refills: 5 | Status: SHIPPED | OUTPATIENT
Start: 2024-06-25

## 2024-06-25 RX ORDER — BUPROPION HYDROCHLORIDE 150 MG/1
150 TABLET ORAL EVERY MORNING
Qty: 30 TABLET | Refills: 5 | Status: SHIPPED | OUTPATIENT
Start: 2024-06-25 | End: 2024-06-25

## 2024-06-25 RX ORDER — OMEPRAZOLE 40 MG/1
40 CAPSULE, DELAYED RELEASE ORAL
Qty: 30 CAPSULE | Refills: 5 | Status: SHIPPED | OUTPATIENT
Start: 2024-06-25 | End: 2024-06-25

## 2024-06-25 RX ORDER — BUPROPION HYDROCHLORIDE 150 MG/1
150 TABLET ORAL EVERY MORNING
Qty: 30 TABLET | Refills: 5 | OUTPATIENT
Start: 2024-06-25

## 2024-06-25 RX ORDER — ONDANSETRON 4 MG/1
4 TABLET, FILM COATED ORAL DAILY PRN
Qty: 30 TABLET | Refills: 5 | Status: SHIPPED | OUTPATIENT
Start: 2024-06-25

## 2024-06-25 RX ORDER — BUPROPION HYDROCHLORIDE 150 MG/1
150 TABLET ORAL EVERY MORNING
Qty: 30 TABLET | Refills: 5 | Status: SHIPPED | OUTPATIENT
Start: 2024-06-25

## 2024-06-25 NOTE — TELEPHONE ENCOUNTER
Patient would like to know if provider can lower prescription qty of bupropion 150 mg to 30 tablets instead of 90 day supply due to insurance will not cover 90 day supply.

## 2024-06-25 NOTE — TELEPHONE ENCOUNTER
The pharmacy should be able to dispense 30 with a 90-day prescription sent, but I can send new prescriptions for Wellbutrin and Prilosec

## 2024-08-23 ENCOUNTER — OFFICE VISIT (OUTPATIENT)
Dept: FAMILY MEDICINE CLINIC | Age: 29
End: 2024-08-23
Payer: COMMERCIAL

## 2024-08-23 VITALS
OXYGEN SATURATION: 97 % | HEIGHT: 75 IN | WEIGHT: 203 LBS | TEMPERATURE: 97.8 F | SYSTOLIC BLOOD PRESSURE: 120 MMHG | DIASTOLIC BLOOD PRESSURE: 80 MMHG | HEART RATE: 82 BPM | RESPIRATION RATE: 16 BRPM | BODY MASS INDEX: 25.24 KG/M2

## 2024-08-23 DIAGNOSIS — F41.9 ANXIETY: ICD-10-CM

## 2024-08-23 DIAGNOSIS — K21.9 GASTROESOPHAGEAL REFLUX DISEASE WITHOUT ESOPHAGITIS: ICD-10-CM

## 2024-08-23 DIAGNOSIS — F34.1 DYSTHYMIA: ICD-10-CM

## 2024-08-23 DIAGNOSIS — F90.2 ATTENTION DEFICIT HYPERACTIVITY DISORDER (ADHD), COMBINED TYPE: Primary | ICD-10-CM

## 2024-08-23 DIAGNOSIS — K58.1 IRRITABLE BOWEL SYNDROME WITH CONSTIPATION: ICD-10-CM

## 2024-08-23 PROCEDURE — 99213 OFFICE O/P EST LOW 20 MIN: CPT | Performed by: NURSE PRACTITIONER

## 2024-08-23 PROCEDURE — G8419 CALC BMI OUT NRM PARAM NOF/U: HCPCS | Performed by: NURSE PRACTITIONER

## 2024-08-23 PROCEDURE — 1036F TOBACCO NON-USER: CPT | Performed by: NURSE PRACTITIONER

## 2024-08-23 PROCEDURE — G8427 DOCREV CUR MEDS BY ELIG CLIN: HCPCS | Performed by: NURSE PRACTITIONER

## 2024-08-23 RX ORDER — BUPROPION HYDROCHLORIDE 300 MG/1
300 TABLET ORAL EVERY MORNING
Qty: 30 TABLET | Refills: 5 | Status: SHIPPED | OUTPATIENT
Start: 2024-08-23

## 2024-08-23 ASSESSMENT — PATIENT HEALTH QUESTIONNAIRE - PHQ9
SUM OF ALL RESPONSES TO PHQ QUESTIONS 1-9: 10
SUM OF ALL RESPONSES TO PHQ QUESTIONS 1-9: 10
SUM OF ALL RESPONSES TO PHQ9 QUESTIONS 1 & 2: 2
3. TROUBLE FALLING OR STAYING ASLEEP: MORE THAN HALF THE DAYS
5. POOR APPETITE OR OVEREATING: MORE THAN HALF THE DAYS
8. MOVING OR SPEAKING SO SLOWLY THAT OTHER PEOPLE COULD HAVE NOTICED. OR THE OPPOSITE, BEING SO FIGETY OR RESTLESS THAT YOU HAVE BEEN MOVING AROUND A LOT MORE THAN USUAL: NOT AT ALL
7. TROUBLE CONCENTRATING ON THINGS, SUCH AS READING THE NEWSPAPER OR WATCHING TELEVISION: MORE THAN HALF THE DAYS
10. IF YOU CHECKED OFF ANY PROBLEMS, HOW DIFFICULT HAVE THESE PROBLEMS MADE IT FOR YOU TO DO YOUR WORK, TAKE CARE OF THINGS AT HOME, OR GET ALONG WITH OTHER PEOPLE: VERY DIFFICULT
1. LITTLE INTEREST OR PLEASURE IN DOING THINGS: SEVERAL DAYS
6. FEELING BAD ABOUT YOURSELF - OR THAT YOU ARE A FAILURE OR HAVE LET YOURSELF OR YOUR FAMILY DOWN: SEVERAL DAYS
SUM OF ALL RESPONSES TO PHQ QUESTIONS 1-9: 10
SUM OF ALL RESPONSES TO PHQ QUESTIONS 1-9: 10
4. FEELING TIRED OR HAVING LITTLE ENERGY: SEVERAL DAYS
2. FEELING DOWN, DEPRESSED OR HOPELESS: SEVERAL DAYS
9. THOUGHTS THAT YOU WOULD BE BETTER OFF DEAD, OR OF HURTING YOURSELF: NOT AT ALL

## 2024-08-23 ASSESSMENT — ANXIETY QUESTIONNAIRES
4. TROUBLE RELAXING: NEARLY EVERY DAY
6. BECOMING EASILY ANNOYED OR IRRITABLE: SEVERAL DAYS
3. WORRYING TOO MUCH ABOUT DIFFERENT THINGS: MORE THAN HALF THE DAYS
5. BEING SO RESTLESS THAT IT IS HARD TO SIT STILL: MORE THAN HALF THE DAYS
GAD7 TOTAL SCORE: 13
2. NOT BEING ABLE TO STOP OR CONTROL WORRYING: MORE THAN HALF THE DAYS
IF YOU CHECKED OFF ANY PROBLEMS ON THIS QUESTIONNAIRE, HOW DIFFICULT HAVE THESE PROBLEMS MADE IT FOR YOU TO DO YOUR WORK, TAKE CARE OF THINGS AT HOME, OR GET ALONG WITH OTHER PEOPLE: SOMEWHAT DIFFICULT
1. FEELING NERVOUS, ANXIOUS, OR ON EDGE: SEVERAL DAYS
7. FEELING AFRAID AS IF SOMETHING AWFUL MIGHT HAPPEN: MORE THAN HALF THE DAYS

## 2024-08-23 ASSESSMENT — ENCOUNTER SYMPTOMS
COUGH: 0
CONSTIPATION: 1
EYES NEGATIVE: 1
RECTAL PAIN: 0
RESPIRATORY NEGATIVE: 1
BLOOD IN STOOL: 1
VOMITING: 0
SHORTNESS OF BREATH: 0
ABDOMINAL PAIN: 1
NAUSEA: 1

## 2024-08-23 NOTE — PROGRESS NOTES
facility-administered medications for this visit.       ALLERGIES    Allergies   Allergen Reactions    Onion Anaphylaxis, Hives and Other (See Comments)    Rondec-D  [Chlophedianol-Pseudoephedrine] Other (See Comments)    Rondec Hives and Nausea And Vomiting       PHYSICAL EXAM   Physical Exam  Vitals and nursing note reviewed.   Constitutional:       General: He is not in acute distress.     Appearance: He is well-developed. He is not diaphoretic.   HENT:      Head: Normocephalic.      Right Ear: Hearing normal.      Left Ear: Hearing normal.   Eyes:      General:         Right eye: No discharge.         Left eye: No discharge.      Pupils: Pupils are equal.   Cardiovascular:      Rate and Rhythm: Normal rate and regular rhythm.      Pulses: Normal pulses.           Radial pulses are 2+ on the right side and 2+ on the left side.      Heart sounds: Normal heart sounds, S1 normal and S2 normal. No murmur heard.  Pulmonary:      Effort: Pulmonary effort is normal. No respiratory distress.      Breath sounds: Normal breath sounds. No wheezing.   Abdominal:      Tenderness: There is abdominal tenderness in the right upper quadrant, right lower quadrant, left upper quadrant and left lower quadrant.   Musculoskeletal:      Cervical back: Normal range of motion.   Skin:     General: Skin is warm and dry.   Neurological:      Mental Status: He is alert and oriented to person, place, and time.   Psychiatric:         Behavior: Behavior normal. Behavior is cooperative.         ASSESSMENT/PLAN  1. Attention deficit hyperactivity disorder (ADHD), combined type  2. Dysthymia  -     buPROPion (WELLBUTRIN XL) 300 MG extended release tablet; Take 1 tablet by mouth every morning, Disp-30 tablet, R-5Normal  3. Anxiety  -     buPROPion (WELLBUTRIN XL) 300 MG extended release tablet; Take 1 tablet by mouth every morning, Disp-30 tablet, R-5Normal  4. Gastroesophageal reflux disease without esophagitis  5. Irritable bowel syndrome with

## 2024-10-16 PROBLEM — R07.9 CHEST PAIN: Status: ACTIVE | Noted: 2024-09-26

## 2024-10-16 RX ORDER — LIDOCAINE 50 MG/G
1 PATCH TOPICAL EVERY 24 HOURS
COMMUNITY
Start: 2024-09-26

## 2024-10-17 ENCOUNTER — OFFICE VISIT (OUTPATIENT)
Dept: FAMILY MEDICINE CLINIC | Age: 29
End: 2024-10-17
Payer: COMMERCIAL

## 2024-10-17 VITALS
DIASTOLIC BLOOD PRESSURE: 88 MMHG | BODY MASS INDEX: 24.25 KG/M2 | WEIGHT: 194 LBS | OXYGEN SATURATION: 98 % | HEART RATE: 97 BPM | SYSTOLIC BLOOD PRESSURE: 132 MMHG

## 2024-10-17 DIAGNOSIS — F90.2 ATTENTION DEFICIT HYPERACTIVITY DISORDER (ADHD), COMBINED TYPE: ICD-10-CM

## 2024-10-17 DIAGNOSIS — R20.0 NUMBNESS AND TINGLING OF BOTH LEGS BELOW KNEES: ICD-10-CM

## 2024-10-17 DIAGNOSIS — R00.0 TACHYCARDIA: ICD-10-CM

## 2024-10-17 DIAGNOSIS — U07.1 COVID-19: Primary | ICD-10-CM

## 2024-10-17 DIAGNOSIS — H65.03 NON-RECURRENT ACUTE SEROUS OTITIS MEDIA OF BOTH EARS: ICD-10-CM

## 2024-10-17 DIAGNOSIS — R07.89 NON-CARDIAC CHEST PAIN: ICD-10-CM

## 2024-10-17 DIAGNOSIS — R20.2 NUMBNESS AND TINGLING OF BOTH LEGS BELOW KNEES: ICD-10-CM

## 2024-10-17 PROCEDURE — G8420 CALC BMI NORM PARAMETERS: HCPCS | Performed by: NURSE PRACTITIONER

## 2024-10-17 PROCEDURE — 99214 OFFICE O/P EST MOD 30 MIN: CPT | Performed by: NURSE PRACTITIONER

## 2024-10-17 PROCEDURE — G8484 FLU IMMUNIZE NO ADMIN: HCPCS | Performed by: NURSE PRACTITIONER

## 2024-10-17 PROCEDURE — 1036F TOBACCO NON-USER: CPT | Performed by: NURSE PRACTITIONER

## 2024-10-17 PROCEDURE — G8427 DOCREV CUR MEDS BY ELIG CLIN: HCPCS | Performed by: NURSE PRACTITIONER

## 2024-10-17 RX ORDER — CIPROFLOXACIN 500 MG/1
500 TABLET, FILM COATED ORAL 2 TIMES DAILY
Qty: 14 TABLET | Refills: 0 | Status: SHIPPED | OUTPATIENT
Start: 2024-10-17 | End: 2024-10-24

## 2024-10-17 RX ORDER — DEXTROAMPHETAMINE SACCHARATE, AMPHETAMINE ASPARTATE, DEXTROAMPHETAMINE SULFATE AND AMPHETAMINE SULFATE 7.5; 7.5; 7.5; 7.5 MG/1; MG/1; MG/1; MG/1
TABLET ORAL
Qty: 45 TABLET | Refills: 0 | Status: SHIPPED | OUTPATIENT
Start: 2024-10-17 | End: 2024-11-16

## 2024-10-17 NOTE — PROGRESS NOTES
[facility]  [facility]  [address]  [address]  [facility] [address]  Dept: [phone]    10/17/2024    CHIEF COMPLAINT    Chief Complaint   Patient presents with    Follow-up     Follow up on short term disability       HPI    Tre Ramírez is a 29 y.o. male who presents   Chief Complaint   Patient presents with    Follow-up     Follow up on short term disability   .  HPI    Appointment to discuss short-term disability for the time period of time when he had COVID.    COVID 19-slow recovery.  Required time off for recovery due to cardiac symptoms and debilitating fatigue.  Continued symptoms include:  Phlebitis and pain.  Noticing that he is bruising very easily.  Notable bilateral ear pain, muffled hearing.  Off and on non-cardiac chest pain multiple times per day coinciding with tachycardia. Occurring about 6 x per day.   Would like referral to cardiology.   Pre-existing numbness in bilateral arms and legs is still daily, but is now more frequent and more severe.     Vitals:    10/17/24 1434   BP: 132/88   Pulse: 97   SpO2: 98%   Weight: 88 kg (194 lb)       Wt Readings from Last 3 Encounters:   10/17/24 88 kg (194 lb)   08/23/24 92.1 kg (203 lb)   06/24/24 102.3 kg (225 lb 9.6 oz)     BP Readings from Last 3 Encounters:   10/17/24 132/88   08/23/24 120/80   06/24/24 124/83       REVIEW OF SYSTEMS    Review of Systems   Constitutional:  Positive for fatigue.   Respiratory:  Positive for shortness of breath. Negative for cough.    Cardiovascular:  Positive for chest pain and palpitations.   Neurological:  Positive for dizziness, light-headedness and numbness.   Psychiatric/Behavioral:  The patient is nervous/anxious.        PAST MEDICAL HISTORY    Past Medical History:   Diagnosis Date    Chronic back pain 2012    Diverticulosis     GERD (gastroesophageal reflux disease)     Hemoptysis 2020    History of intussusception     OR at [facility]    Lung

## 2024-10-29 NOTE — PROGRESS NOTES
[facility]  [address]  [address] [address]  Dept: [phone]    2023    TELEHEALTH EVALUATION -- Audio/Visual (During [facility]KJQL-71 public health emergency)  Naif Jenkins (:  1995) has requested an audio/video evaluation for the following concern(s):    HPI:    Appointment to follow-up on ADHD, depression and anxiety. ADHD-trial of Adderall XR 15 mg provided to patient at last appointment. Does not feel like the Adderall is doing a lot thus far. He has noticed a slight difference. Wondering if he would do better on a higher  dose, but worried about side effects. Side effects of decreased appetite to the point of n/v if he forces himself to eat, only sleeping 2-3 hours per night and dry mouth. Denies constipation, increased anxiety and heart palpitations. Taking Adderall between 730-830 every AM.    Depression/anxiety-Wellbutrin decreased from 300 mg to 150 mg. Doing well over all. Patient-Reported Vitals 2023   Patient-Reported Weight 210 lbs   Patient-Reported Height 6 feet 3 inches   Patient-Reported Temperature -          There were no vitals filed for this visit. Wt Readings from Last 3 Encounters:   23 220 lb (99.8 kg)   22 226 lb 2 oz (102.6 kg)   10/19/22 220 lb 12.8 oz (100.2 kg)     BP Readings from Last 3 Encounters:   23 115/74   22 111/74   10/19/22 132/84       REVIEW OF SYSTEMS:   Review of Systems   Constitutional:  Positive for appetite change. Negative for chills, fatigue and fever. Eyes: Negative. Negative for visual disturbance. Respiratory:  Positive for shortness of breath (with anxiety). Cardiovascular:  Positive for palpitations (with anxiety). Negative for chest pain. Gastrointestinal:  Positive for abdominal pain and nausea. Musculoskeletal:  Positive for back pain and neck pain. Neurological: Negative. Negative for dizziness and headaches. Psychiatric/Behavioral:  Positive for decreased concentration, dysphoric mood (mild) and sleep disturbance. The patient is nervous/anxious.       PAST MEDICAL HISTORY:    Past Medical History:   Diagnosis Date    Chronic back pain     Diverticulosis     GERD (gastroesophageal reflux disease)     Hemoptysis     History of intussusception     OR at [facility]    Lung nodule     LLL nodule    Undescended right testes        FAMILY HISTORY:    Family History   Problem Relation Age of Onset    High Blood Pressure Mother     Heart Disease Mother     Heart Attack Mother 40    Cancer Father         unknown kind     Heart Attack Maternal Grandmother 61        COD     Other Sister         POTS     Heart Attack Maternal Grandfather 48    Prostate Cancer Maternal Grandfather     Stroke Maternal Grandfather             No Known Problems Paternal Grandmother     No Known Problems Paternal Grandfather        SOCIAL HISTORY:    Social History     Socioeconomic History    Marital status: Single   Tobacco Use    Smoking status: Former     Packs/day: 1.00     Years: 6.00     Pack years: 6.00     Types: Cigarettes     Quit date: 2018     Years since quittin.6    Smokeless tobacco: Never   Vaping Use    Vaping Use: Every day    Substances: Always   Substance and Sexual Activity    Alcohol use: Yes     Comment: Occasionally, very rare    Drug use: No    Sexual activity: Yes     Partners: Female     Social Determinants of Health     Financial Resource Strain: Low Risk     Difficulty of Paying Living Expenses: Not hard at all   Food Insecurity: No Food Insecurity    Worried About Running Out of Food in the Last Year: Never true    Ran Out of Food in the Last Year: Never true   Transportation Needs: No Transportation Needs    Lack of Transportation (Medical): No    Lack of Transportation (Non-Medical): No   Housing Stability: Unknown    Unstable Housing in the Last Year: No       SURGICAL HISTORY:    Past Surgical History:   Procedure Laterality Date    BRONCHOSCOPY N/A 10/08/2020    BRONCHOSCOPY performed by Omkar Blackman MD at [facility]    COLON SURGERY      intussusception    COLONOSCOPY  09/22/2020    Dr. Lea Shaver with [facility]    TESTICLE SURGERY Right 2011    undecended testicle operation     TYMPANOSTOMY TUBE PLACEMENT  1995       CURRENT MEDICATIONS:    Current Outpatient Medications   Medication Sig Dispense Refill    buPROPion (WELLBUTRIN XL) 150 MG extended release tablet Take 1 tablet by mouth every morning 30 tablet 3    omeprazole (PRILOSEC) 40 MG delayed release capsule Take 1 capsule by mouth every morning (before breakfast) 30 capsule 5    ondansetron (ZOFRAN) 4 MG tablet Take 1 tablet by mouth daily as needed for Nausea or Vomiting 30 tablet 5    amphetamine-dextroamphetamine (ADDERALL, 20MG,) 20 MG tablet Take 1 tablet by mouth daily AND 0.5 tablets Daily with lunch. Do all this for 30 days. Max Daily Amount: 30 mg. 45 tablet 0     No current facility-administered medications for this visit. ALLERGIES:   Allergies   Allergen Reactions    Onion Anaphylaxis, Hives and Other (See Comments)    Rondec-D  [Chlophedianol-Pseudoephedrine] Other (See Comments)    Rondec Hives and Nausea And Vomiting       PHYSICAL EXAM:   Physical Exam  Vitals and nursing note reviewed. Constitutional:       General: He is not in acute distress. Appearance: He is well-developed. He is not diaphoretic. Interventions: Face mask in place. HENT:      Head: Normocephalic. Right Ear: Hearing normal.      Left Ear: Hearing normal.   Eyes:      General:         Right eye: No discharge. Left eye: No discharge. Pupils: Pupils are equal.   Cardiovascular:      Rate and Rhythm: Normal rate and regular rhythm. Pulses: Normal pulses. Radial pulses are 2+ on the right side and 2+ on the left side.       Heart sounds: Normal heart sounds, S1 normal and S2 normal. No murmur heard.  Pulmonary:      Effort: Pulmonary effort is normal. No respiratory distress. Breath sounds: Normal breath sounds. No wheezing. Musculoskeletal:      Cervical back: Normal range of motion. Skin:     General: Skin is warm and dry. Neurological:      Mental Status: He is alert and oriented to person, place, and time. Psychiatric:         Behavior: Behavior normal. Behavior is cooperative. ASSESSMENT/PLAN:  1. Attention deficit hyperactivity disorder (ADHD), combined type  2. Anxiety  3. Dysthymia     ADHD unclear control. Will trial Adderall IR 10 mg BID, but advised he could break this in half and trial 15 mg dose or even 20 mg if wanted. Anxiety and depression borderline controlled. . Continue current medications, self care and physical activity. Return in about 3 months (around 4/26/2023) for ADHD/ADD  check, depression, Anxiety. Kerline Mcmanus is a 32 y.o. male being evaluated by a Virtual Visit (video visit) encounter to address concerns as mentioned above. A caregiver was present when appropriate. Due to this being a TeleHealth encounter (During [facility] public health emergency), evaluation of the following organ systems was limited: Vitals/Constitutional/EENT/Resp/CV/GI//MS/Neuro/Skin/Heme-Lymph-Imm. Pursuant to the emergency declaration under the [address] [place] [address], [address] [place] [address] authority and the [employer] Act, this Virtual Visit was conducted with patient's (and/or legal guardian's) consent, to reduce the patient's risk of exposure to COVID-19 and provide necessary medical care. The patient (and/or legal guardian) has also been advised to contact this office for worsening conditions or problems, and seek emergency medical treatment and/or call 911 if deemed necessary. Services were provided through a video synchronous discussion virtually to substitute for in-person clinic visit. Patient and provider were located at their individual homes. --ANDREA Raines - CNP on 1/26/2023 at 9:48 AM    (Please note that portions of this note were completed with a voice recognition program. Efforts were made to edit the dictations but occasionally words are mis-transcribed. )      An electronic signature was used to authenticate this note. normal...

## 2024-11-22 RX ORDER — METOPROLOL TARTRATE 25 MG/1
25 TABLET, FILM COATED ORAL 2 TIMES DAILY WITH MEALS
COMMUNITY
Start: 2024-10-28 | End: 2024-11-25 | Stop reason: ALTCHOICE

## 2024-11-25 ENCOUNTER — OFFICE VISIT (OUTPATIENT)
Dept: FAMILY MEDICINE CLINIC | Age: 29
End: 2024-11-25
Payer: COMMERCIAL

## 2024-11-25 VITALS
SYSTOLIC BLOOD PRESSURE: 134 MMHG | HEIGHT: 75 IN | BODY MASS INDEX: 25.12 KG/M2 | RESPIRATION RATE: 16 BRPM | OXYGEN SATURATION: 99 % | WEIGHT: 202 LBS | TEMPERATURE: 97.8 F | DIASTOLIC BLOOD PRESSURE: 84 MMHG | HEART RATE: 84 BPM

## 2024-11-25 DIAGNOSIS — R00.0 TACHYCARDIA: ICD-10-CM

## 2024-11-25 DIAGNOSIS — F41.9 ANXIETY: ICD-10-CM

## 2024-11-25 DIAGNOSIS — F90.2 ATTENTION DEFICIT HYPERACTIVITY DISORDER (ADHD), COMBINED TYPE: Primary | ICD-10-CM

## 2024-11-25 DIAGNOSIS — K21.9 GASTROESOPHAGEAL REFLUX DISEASE WITHOUT ESOPHAGITIS: ICD-10-CM

## 2024-11-25 DIAGNOSIS — F34.1 DYSTHYMIA: ICD-10-CM

## 2024-11-25 PROCEDURE — 99213 OFFICE O/P EST LOW 20 MIN: CPT | Performed by: NURSE PRACTITIONER

## 2024-11-25 PROCEDURE — G8484 FLU IMMUNIZE NO ADMIN: HCPCS | Performed by: NURSE PRACTITIONER

## 2024-11-25 PROCEDURE — G8427 DOCREV CUR MEDS BY ELIG CLIN: HCPCS | Performed by: NURSE PRACTITIONER

## 2024-11-25 PROCEDURE — G8419 CALC BMI OUT NRM PARAM NOF/U: HCPCS | Performed by: NURSE PRACTITIONER

## 2024-11-25 PROCEDURE — 1036F TOBACCO NON-USER: CPT | Performed by: NURSE PRACTITIONER

## 2024-11-25 ASSESSMENT — ENCOUNTER SYMPTOMS
EYES NEGATIVE: 1
RECTAL PAIN: 0
RESPIRATORY NEGATIVE: 1
SHORTNESS OF BREATH: 0
ABDOMINAL PAIN: 1
CONSTIPATION: 1
VOMITING: 0
NAUSEA: 1
COUGH: 0
BLOOD IN STOOL: 1

## 2024-11-25 ASSESSMENT — ANXIETY QUESTIONNAIRES
5. BEING SO RESTLESS THAT IT IS HARD TO SIT STILL: NEARLY EVERY DAY
IF YOU CHECKED OFF ANY PROBLEMS ON THIS QUESTIONNAIRE, HOW DIFFICULT HAVE THESE PROBLEMS MADE IT FOR YOU TO DO YOUR WORK, TAKE CARE OF THINGS AT HOME, OR GET ALONG WITH OTHER PEOPLE: SOMEWHAT DIFFICULT
GAD7 TOTAL SCORE: 17
7. FEELING AFRAID AS IF SOMETHING AWFUL MIGHT HAPPEN: MORE THAN HALF THE DAYS
6. BECOMING EASILY ANNOYED OR IRRITABLE: MORE THAN HALF THE DAYS
4. TROUBLE RELAXING: NEARLY EVERY DAY
1. FEELING NERVOUS, ANXIOUS, OR ON EDGE: MORE THAN HALF THE DAYS
2. NOT BEING ABLE TO STOP OR CONTROL WORRYING: MORE THAN HALF THE DAYS
3. WORRYING TOO MUCH ABOUT DIFFERENT THINGS: NEARLY EVERY DAY

## 2024-11-25 ASSESSMENT — PATIENT HEALTH QUESTIONNAIRE - PHQ9
3. TROUBLE FALLING OR STAYING ASLEEP: NEARLY EVERY DAY
SUM OF ALL RESPONSES TO PHQ QUESTIONS 1-9: 17
6. FEELING BAD ABOUT YOURSELF - OR THAT YOU ARE A FAILURE OR HAVE LET YOURSELF OR YOUR FAMILY DOWN: MORE THAN HALF THE DAYS
7. TROUBLE CONCENTRATING ON THINGS, SUCH AS READING THE NEWSPAPER OR WATCHING TELEVISION: MORE THAN HALF THE DAYS
SUM OF ALL RESPONSES TO PHQ QUESTIONS 1-9: 17
SUM OF ALL RESPONSES TO PHQ QUESTIONS 1-9: 17
SUM OF ALL RESPONSES TO PHQ9 QUESTIONS 1 & 2: 4
9. THOUGHTS THAT YOU WOULD BE BETTER OFF DEAD, OR OF HURTING YOURSELF: SEVERAL DAYS
1. LITTLE INTEREST OR PLEASURE IN DOING THINGS: MORE THAN HALF THE DAYS
2. FEELING DOWN, DEPRESSED OR HOPELESS: MORE THAN HALF THE DAYS
SUM OF ALL RESPONSES TO PHQ QUESTIONS 1-9: 16
10. IF YOU CHECKED OFF ANY PROBLEMS, HOW DIFFICULT HAVE THESE PROBLEMS MADE IT FOR YOU TO DO YOUR WORK, TAKE CARE OF THINGS AT HOME, OR GET ALONG WITH OTHER PEOPLE: VERY DIFFICULT
4. FEELING TIRED OR HAVING LITTLE ENERGY: MORE THAN HALF THE DAYS
5. POOR APPETITE OR OVEREATING: MORE THAN HALF THE DAYS
8. MOVING OR SPEAKING SO SLOWLY THAT OTHER PEOPLE COULD HAVE NOTICED. OR THE OPPOSITE, BEING SO FIGETY OR RESTLESS THAT YOU HAVE BEEN MOVING AROUND A LOT MORE THAN USUAL: SEVERAL DAYS

## 2024-11-25 ASSESSMENT — COLUMBIA-SUICIDE SEVERITY RATING SCALE - C-SSRS
1. WITHIN THE PAST MONTH, HAVE YOU WISHED YOU WERE DEAD OR WISHED YOU COULD GO TO SLEEP AND NOT WAKE UP?: YES
6. HAVE YOU EVER DONE ANYTHING, STARTED TO DO ANYTHING, OR PREPARED TO DO ANYTHING TO END YOUR LIFE?: NO
2. HAVE YOU ACTUALLY HAD ANY THOUGHTS OF KILLING YOURSELF?: NO

## 2024-11-25 NOTE — PROGRESS NOTES
[facility]  [facility]ICINE  [address]  [address]  [facility] [address]  Dept: [phone]    11/25/2024    CHIEF COMPLAINT    Chief Complaint   Patient presents with    ADHD       HPI    Tre Ramírez is a 29 y.o. male who presents   Chief Complaint   Patient presents with    ADHD     HPI    Appointment to f/u on ADHD, tachycardia and f/u after having COVID.    Specialists:     Gastroenterology- Dr. Benoit & [facility] Do.   [facility]urology [facility]   Cardiology- [facility]    ADHD- Adderall 30 mg resumed in AM with 15 mg in PM .  This dosage is working well.    Anxiety/Depression- Wellbutrin increased from 150 mg to 300 mg in August.   He initially felt it was helping better at the higher dose, but does note high stress with being off work.   Not sleeping well due to this.     PHQ-9 Total Score: 17 (11/25/2024 11:56 AM)  Thoughts that you would be better off dead, or of hurting yourself in some way: 1 (11/25/2024 11:56 AM)    Tachycardia- established with [facility] on 10/25/2024.   Advised to stay well hydrated, get thyroid labs as ordered by our office, start metoprolol and get cardiac echo prior to f/u with their office in 4 weeks.   Next appointment in 12/4/2024.  Did not tolerate metoprolol due to feeling like it made his heart rate too low, had fatigue and increased dizziness.   Has paperwork for being off work for tachycardia that indicates he needs the paperwork to indicate cardiac function and if he can return without restrictions physically.   He was told by his employer that he cannot return to work until he's done with his appointments.  He has attempted to return to work 3 times and advised he cannot return until he's signed off to return on his recent papers.    Vitals:    11/25/24 1152   BP: 134/84   Site: Left Upper Arm   Position: Sitting   Cuff Size: Large Adult   Pulse: 84   Resp: 16   Temp: 97.8 °F (36.6 °C)   TempSrc:

## 2024-12-05 ENCOUNTER — TELEMEDICINE (OUTPATIENT)
Dept: FAMILY MEDICINE CLINIC | Age: 29
End: 2024-12-05
Payer: COMMERCIAL

## 2024-12-05 DIAGNOSIS — G47.09 OTHER INSOMNIA: ICD-10-CM

## 2024-12-05 DIAGNOSIS — F90.2 ATTENTION DEFICIT HYPERACTIVITY DISORDER (ADHD), COMBINED TYPE: ICD-10-CM

## 2024-12-05 DIAGNOSIS — F34.1 DYSTHYMIA: Primary | ICD-10-CM

## 2024-12-05 DIAGNOSIS — F41.9 ANXIETY: ICD-10-CM

## 2024-12-05 PROCEDURE — 1036F TOBACCO NON-USER: CPT | Performed by: NURSE PRACTITIONER

## 2024-12-05 PROCEDURE — G8427 DOCREV CUR MEDS BY ELIG CLIN: HCPCS | Performed by: NURSE PRACTITIONER

## 2024-12-05 PROCEDURE — G8484 FLU IMMUNIZE NO ADMIN: HCPCS | Performed by: NURSE PRACTITIONER

## 2024-12-05 PROCEDURE — G8419 CALC BMI OUT NRM PARAM NOF/U: HCPCS | Performed by: NURSE PRACTITIONER

## 2024-12-05 PROCEDURE — 99214 OFFICE O/P EST MOD 30 MIN: CPT | Performed by: NURSE PRACTITIONER

## 2024-12-05 RX ORDER — BUPROPION HYDROCHLORIDE 150 MG/1
150 TABLET, EXTENDED RELEASE ORAL 2 TIMES DAILY
Qty: 60 TABLET | Refills: 3 | Status: SHIPPED | OUTPATIENT
Start: 2024-12-05

## 2024-12-05 RX ORDER — TRAZODONE HYDROCHLORIDE 50 MG/1
25-50 TABLET, FILM COATED ORAL NIGHTLY PRN
Qty: 30 TABLET | Refills: 2 | Status: SHIPPED | OUTPATIENT
Start: 2024-12-05

## 2024-12-05 ASSESSMENT — PATIENT HEALTH QUESTIONNAIRE - PHQ9
9. THOUGHTS THAT YOU WOULD BE BETTER OFF DEAD, OR OF HURTING YOURSELF: NOT AT ALL
SUM OF ALL RESPONSES TO PHQ QUESTIONS 1-9: 21
3. TROUBLE FALLING OR STAYING ASLEEP: NEARLY EVERY DAY
4. FEELING TIRED OR HAVING LITTLE ENERGY: NEARLY EVERY DAY
7. TROUBLE CONCENTRATING ON THINGS, SUCH AS READING THE NEWSPAPER OR WATCHING TELEVISION: MORE THAN HALF THE DAYS
SUM OF ALL RESPONSES TO PHQ9 QUESTIONS 1 & 2: 6
SUM OF ALL RESPONSES TO PHQ QUESTIONS 1-9: 21
SUM OF ALL RESPONSES TO PHQ QUESTIONS 1-9: 21
10. IF YOU CHECKED OFF ANY PROBLEMS, HOW DIFFICULT HAVE THESE PROBLEMS MADE IT FOR YOU TO DO YOUR WORK, TAKE CARE OF THINGS AT HOME, OR GET ALONG WITH OTHER PEOPLE: VERY DIFFICULT
1. LITTLE INTEREST OR PLEASURE IN DOING THINGS: NEARLY EVERY DAY
2. FEELING DOWN, DEPRESSED OR HOPELESS: NEARLY EVERY DAY
5. POOR APPETITE OR OVEREATING: NEARLY EVERY DAY
6. FEELING BAD ABOUT YOURSELF - OR THAT YOU ARE A FAILURE OR HAVE LET YOURSELF OR YOUR FAMILY DOWN: MORE THAN HALF THE DAYS
8. MOVING OR SPEAKING SO SLOWLY THAT OTHER PEOPLE COULD HAVE NOTICED. OR THE OPPOSITE, BEING SO FIGETY OR RESTLESS THAT YOU HAVE BEEN MOVING AROUND A LOT MORE THAN USUAL: MORE THAN HALF THE DAYS
SUM OF ALL RESPONSES TO PHQ QUESTIONS 1-9: 21

## 2024-12-05 ASSESSMENT — COLUMBIA-SUICIDE SEVERITY RATING SCALE - C-SSRS
6. HAVE YOU EVER DONE ANYTHING, STARTED TO DO ANYTHING, OR PREPARED TO DO ANYTHING TO END YOUR LIFE?: NO
1. WITHIN THE PAST MONTH, HAVE YOU WISHED YOU WERE DEAD OR WISHED YOU COULD GO TO SLEEP AND NOT WAKE UP?: NO
2. HAVE YOU ACTUALLY HAD ANY THOUGHTS OF KILLING YOURSELF?: NO

## 2024-12-05 NOTE — PROGRESS NOTES
[facility]  [facility] MEDICINE  [address]  [address]  [facility] [address]  Dept: [phone]    2024    TELEHEALTH EVALUATION -- Audio/Visual   Tre Ramírez (:  1995) has requested an audio/video evaluation for the following concern(s):    HPI:  HPI     Appointment to follow-up on anxiety and depression.    Specialists:     Gastroenterology- Dr. Benoit & [facility] Do.   [facility]urology [facility]   Cardiology- [facility]    Anxiety/depression-taking Wellbutrin 300 mg daily and Ativan 0.5 mg as needed.  Last filled 2024    PHQ-9 Total Score: 21 (2024 12:56 PM)  Thoughts that you would be better off dead, or of hurting yourself in some way: 0 (2024 12:56 PM)    There were no vitals filed for this visit.        2024     8:20 AM   Patient-Reported Vitals   Patient-Reported Weight 205   Patient-Reported Height 6’3”         Wt Readings from Last 3 Encounters:   24 91.6 kg (202 lb)   10/17/24 88 kg (194 lb)   24 92.1 kg (203 lb)     BP Readings from Last 3 Encounters:   24 134/84   10/17/24 132/88   24 120/80       REVIEW OF SYSTEMS:   Review of Systems   Constitutional: Negative.  Negative for chills, fatigue and fever.   Respiratory: Negative.  Negative for cough and shortness of breath.    Genitourinary: Negative.    Neurological: Negative.  Negative for dizziness and headaches.   Psychiatric/Behavioral:  Positive for dysphoric mood and sleep disturbance. The patient is nervous/anxious.        PAST MEDICAL HISTORY:    Past Medical History:   Diagnosis Date    Chronic back pain     Diverticulosis     GERD (gastroesophageal reflux disease)     Hemoptysis     History of intussusception     OR at [facility]    Lung nodule     LLL nodule    Undescended right testes        FAMILY HISTORY:    Family History   Problem Relation Age of Onset    High Blood Pressure Mother     Heart Disease

## 2024-12-05 NOTE — PATIENT INSTRUCTIONS
Thank you for choosing [facility].  We know you have options when it comes to your healthcare; we appreciate that you chose us. Our goal is to provide exceptional  service and world class care to every patient.  You will be receiving a survey via email or text message asking for your feedback.  Please take a few minutes to share your thoughts about your recent visit. Your comments helps us understand what we do well and ways we can improve.  Thank you in advance for your valuable feedback.              New Updates for [facility]    Thank you for choosing [facility] to give you the best care! [facility] is always trying to think of new ways to help their patients. We are asking all patients to try out the new digital registration that is now available through the [facility]. Down load today!. Via the jane you're now able to update your personal and registration information prior to your upcoming appointment. This will save you time once you arrive at the office to check-in, not to mention your information remains safe!! Many other perks come from signing up for an account, such as:  Requesting refills  Scheduling an appointment  Completing an E-Visit  Sending a message to the office/provider  Having access to your medication list  Paying your bill/copay prior to your appointment  Scheduling your yearly mammogram  Review your test results    If you are not familiar the [facility], please ask one of us and we will be happy to answer any questions or help you set-up your account.

## 2024-12-26 DIAGNOSIS — F90.2 ATTENTION DEFICIT HYPERACTIVITY DISORDER (ADHD), COMBINED TYPE: ICD-10-CM

## 2024-12-28 RX ORDER — DEXTROAMPHETAMINE SACCHARATE, AMPHETAMINE ASPARTATE, DEXTROAMPHETAMINE SULFATE AND AMPHETAMINE SULFATE 7.5; 7.5; 7.5; 7.5 MG/1; MG/1; MG/1; MG/1
TABLET ORAL
Qty: 45 TABLET | Refills: 0 | Status: SHIPPED | OUTPATIENT
Start: 2024-12-28 | End: 2025-01-27

## 2025-01-16 ENCOUNTER — TELEMEDICINE (OUTPATIENT)
Dept: FAMILY MEDICINE CLINIC | Age: 30
End: 2025-01-16

## 2025-01-16 DIAGNOSIS — F41.9 ANXIETY: Primary | ICD-10-CM

## 2025-01-16 DIAGNOSIS — F34.1 DYSTHYMIA: ICD-10-CM

## 2025-01-16 DIAGNOSIS — G47.09 OTHER INSOMNIA: ICD-10-CM

## 2025-01-16 PROCEDURE — 99213 OFFICE O/P EST LOW 20 MIN: CPT | Performed by: NURSE PRACTITIONER

## 2025-01-16 PROCEDURE — G8427 DOCREV CUR MEDS BY ELIG CLIN: HCPCS | Performed by: NURSE PRACTITIONER

## 2025-01-16 RX ORDER — ARIPIPRAZOLE 2 MG/1
2 TABLET ORAL DAILY
Qty: 30 TABLET | Refills: 3 | Status: SHIPPED | OUTPATIENT
Start: 2025-01-16

## 2025-01-16 SDOH — ECONOMIC STABILITY: FOOD INSECURITY: WITHIN THE PAST 12 MONTHS, THE FOOD YOU BOUGHT JUST DIDN'T LAST AND YOU DIDN'T HAVE MONEY TO GET MORE.: SOMETIMES TRUE

## 2025-01-16 SDOH — ECONOMIC STABILITY: INCOME INSECURITY: IN THE LAST 12 MONTHS, WAS THERE A TIME WHEN YOU WERE NOT ABLE TO PAY THE MORTGAGE OR RENT ON TIME?: NO

## 2025-01-16 SDOH — ECONOMIC STABILITY: FOOD INSECURITY: WITHIN THE PAST 12 MONTHS, YOU WORRIED THAT YOUR FOOD WOULD RUN OUT BEFORE YOU GOT MONEY TO BUY MORE.: SOMETIMES TRUE

## 2025-01-16 ASSESSMENT — ENCOUNTER SYMPTOMS
SHORTNESS OF BREATH: 0
RESPIRATORY NEGATIVE: 1
COUGH: 0

## 2025-01-16 NOTE — PROGRESS NOTES
[facility]  [facility] MEDICINE  [address]  [address]  [facility] [address]  Dept: [phone]    2025    TELEHEALTH EVALUATION -- Audio/Visual   Tre Ramírez (:  1995) has requested an audio/video evaluation for the following concern(s):    HPI:  HPI     Appointment to follow-up on anxiety, depression and insomnia    Specialists:     Gastroenterology- Dr. Benoit & [facility] Do.   Neurology Dr[facility]   Cardiology- [facility]    Anxiety/depression-Wellbutrin switched from Wellbutrin  mg daily to Wellbutrin  mg twice daily.   Liking the twice daily better, but not feeling his mood is any better.   Struggling to find another job at the moment       No data recorded    Insomnia-restarted previously well-tolerated trazodone for insomnia. Doesn't feel the trazodone is helping him fall asleep and is making him very groggy the next day.       There were no vitals filed for this visit.        2024     8:20 AM   Patient-Reported Vitals   Patient-Reported Weight 205   Patient-Reported Height 6’3”         Wt Readings from Last 3 Encounters:   24 91.6 kg (202 lb)   10/17/24 88 kg (194 lb)   24 92.1 kg (203 lb)     BP Readings from Last 3 Encounters:   24 134/84   10/17/24 132/88   24 120/80       REVIEW OF SYSTEMS:   Review of Systems   Constitutional: Negative.  Negative for chills, fatigue and fever.   Respiratory: Negative.  Negative for cough and shortness of breath.    Cardiovascular: Negative.  Negative for chest pain (significantly reduced) and palpitations.   Genitourinary: Negative.    Musculoskeletal:  Positive for arthralgias, joint swelling (left knee) and myalgias.   Neurological: Negative.  Negative for dizziness and headaches.   Psychiatric/Behavioral:  Positive for dysphoric mood and sleep disturbance. The patient is nervous/anxious.        PAST MEDICAL HISTORY:    Past Medical History:

## 2025-01-20 ENCOUNTER — TELEPHONE (OUTPATIENT)
Dept: FAMILY MEDICINE CLINIC | Age: 30
End: 2025-01-20

## 2025-01-20 NOTE — TELEPHONE ENCOUNTER
Lmor, to call us back for Ins info for 2025.  Per provider called the pt, no ins id on file. May have [employer], via his wife.

## 2025-02-26 ASSESSMENT — ENCOUNTER SYMPTOMS: RESPIRATORY NEGATIVE: 1

## 2025-02-27 ENCOUNTER — TELEMEDICINE (OUTPATIENT)
Dept: FAMILY MEDICINE CLINIC | Age: 30
End: 2025-02-27

## 2025-02-27 DIAGNOSIS — Z13.228 SCREENING FOR ENDOCRINE, NUTRITIONAL, METABOLIC AND IMMUNITY DISORDER: ICD-10-CM

## 2025-02-27 DIAGNOSIS — F90.2 ATTENTION DEFICIT HYPERACTIVITY DISORDER (ADHD), COMBINED TYPE: ICD-10-CM

## 2025-02-27 DIAGNOSIS — F41.9 ANXIETY: Primary | ICD-10-CM

## 2025-02-27 DIAGNOSIS — R11.2 NAUSEA AND VOMITING, UNSPECIFIED VOMITING TYPE: ICD-10-CM

## 2025-02-27 DIAGNOSIS — Z13.0 SCREENING FOR ENDOCRINE, NUTRITIONAL, METABOLIC AND IMMUNITY DISORDER: ICD-10-CM

## 2025-02-27 DIAGNOSIS — Z13.21 SCREENING FOR ENDOCRINE, NUTRITIONAL, METABOLIC AND IMMUNITY DISORDER: ICD-10-CM

## 2025-02-27 DIAGNOSIS — Z13.29 SCREENING FOR ENDOCRINE, NUTRITIONAL, METABOLIC AND IMMUNITY DISORDER: ICD-10-CM

## 2025-02-27 DIAGNOSIS — G47.09 OTHER INSOMNIA: ICD-10-CM

## 2025-02-27 DIAGNOSIS — K21.9 GASTROESOPHAGEAL REFLUX DISEASE WITHOUT ESOPHAGITIS: ICD-10-CM

## 2025-02-27 DIAGNOSIS — F34.1 DYSTHYMIA: ICD-10-CM

## 2025-02-27 DIAGNOSIS — R68.89 COLD INTOLERANCE: ICD-10-CM

## 2025-02-27 PROCEDURE — 99214 OFFICE O/P EST MOD 30 MIN: CPT | Performed by: NURSE PRACTITIONER

## 2025-02-27 RX ORDER — TRAZODONE HYDROCHLORIDE 50 MG/1
25-50 TABLET ORAL NIGHTLY PRN
Qty: 30 TABLET | Refills: 2 | Status: SHIPPED | OUTPATIENT
Start: 2025-02-27

## 2025-02-27 RX ORDER — DEXTROAMPHETAMINE SACCHARATE, AMPHETAMINE ASPARTATE, DEXTROAMPHETAMINE SULFATE AND AMPHETAMINE SULFATE 7.5; 7.5; 7.5; 7.5 MG/1; MG/1; MG/1; MG/1
TABLET ORAL
Qty: 45 TABLET | Refills: 0 | Status: SHIPPED | OUTPATIENT
Start: 2025-02-27 | End: 2025-03-29

## 2025-02-27 RX ORDER — DEXTROAMPHETAMINE SACCHARATE, AMPHETAMINE ASPARTATE, DEXTROAMPHETAMINE SULFATE AND AMPHETAMINE SULFATE 7.5; 7.5; 7.5; 7.5 MG/1; MG/1; MG/1; MG/1
TABLET ORAL
Qty: 45 TABLET | Refills: 0 | OUTPATIENT
Start: 2025-02-27 | End: 2025-03-29

## 2025-02-27 ASSESSMENT — ENCOUNTER SYMPTOMS
ABDOMINAL DISTENTION: 1
ABDOMINAL PAIN: 1
CONSTIPATION: 1
BACK PAIN: 1
NAUSEA: 1

## 2025-02-27 NOTE — PROGRESS NOTES
[facility]  [facility] MEDICINE  [address]  [address]  [facility] [address]  Dept: [phone]    2025    TELEHEALTH EVALUATION -- Audio/Visual   Tre Ramírez (:  1995) has requested an audio/video evaluation for the following concern(s):    HPI:  Appointment to follow-up on anxiety and depression.    Specialists:     Gastroenterology- Dr. Benoit & [facility] Do.   [facility]rology [facility]   Cardiology- [facility]    Anxiety/depression/insomnia- was taking Wellbutrin  g twice daily along with Abilify that was added at appointment in January.  He tried the Abilify for 3 weeks without any notable improvement in anxiety and depressive symptoms.  Denies any side effects from Abilify.  Weaned off of Wellbutrin on his own without any discontinuation side effects  Now taking Trazodone at night 50 mg, stopped wellbutrin and is taking Adderall during the day. Feeling better overall.     Acid reflux, nausea, abdominal pain and cramping-increase since last appointment.  Taking Zofran and omeprazole.  Has longstanding history of intermittent abdominal concerns.  Has never seen a dietitian.    Increased chronic back pain lately.    Notable cold tolerance.  Wondering if he should go back on iron supplement.  Has not had iron labs as ordered in 2024.         There were no vitals filed for this visit.        2024     8:20 AM   Patient-Reported Vitals   Patient-Reported Weight 205   Patient-Reported Height 6’3”         Wt Readings from Last 3 Encounters:   24 91.6 kg (202 lb)   10/17/24 88 kg (194 lb)   24 92.1 kg (203 lb)     BP Readings from Last 3 Encounters:   24 134/84   10/17/24 132/88   24 120/80       REVIEW OF SYSTEMS:   Review of Systems   Constitutional:  Negative for chills, fatigue and fever.   Respiratory: Negative.     Cardiovascular: Negative.  Negative for chest pain (mostly resolved) and

## 2025-03-25 ENCOUNTER — OFFICE VISIT (OUTPATIENT)
Dept: FAMILY MEDICINE CLINIC | Age: 30
End: 2025-03-25

## 2025-03-25 VITALS
BODY MASS INDEX: 28.57 KG/M2 | HEIGHT: 75 IN | TEMPERATURE: 98.2 F | HEART RATE: 88 BPM | DIASTOLIC BLOOD PRESSURE: 81 MMHG | WEIGHT: 229.8 LBS | SYSTOLIC BLOOD PRESSURE: 124 MMHG

## 2025-03-25 DIAGNOSIS — F90.2 ATTENTION DEFICIT HYPERACTIVITY DISORDER (ADHD), COMBINED TYPE: Primary | ICD-10-CM

## 2025-03-25 DIAGNOSIS — R11.2 NAUSEA AND VOMITING, UNSPECIFIED VOMITING TYPE: ICD-10-CM

## 2025-03-25 DIAGNOSIS — R73.03 PREDIABETES: ICD-10-CM

## 2025-03-25 DIAGNOSIS — G47.09 OTHER INSOMNIA: ICD-10-CM

## 2025-03-25 DIAGNOSIS — K57.90 DIVERTICULOSIS: ICD-10-CM

## 2025-03-25 DIAGNOSIS — F41.9 ANXIETY: ICD-10-CM

## 2025-03-25 DIAGNOSIS — S02.5XXB OPEN FRACTURE OF TOOTH, INITIAL ENCOUNTER: ICD-10-CM

## 2025-03-25 DIAGNOSIS — K21.9 GASTROESOPHAGEAL REFLUX DISEASE WITHOUT ESOPHAGITIS: ICD-10-CM

## 2025-03-25 DIAGNOSIS — F34.1 DYSTHYMIA: ICD-10-CM

## 2025-03-25 RX ORDER — CHLORHEXIDINE GLUCONATE ORAL RINSE 1.2 MG/ML
15 SOLUTION DENTAL 2 TIMES DAILY
Qty: 420 ML | Refills: 3 | Status: SHIPPED | OUTPATIENT
Start: 2025-03-25 | End: 2025-04-08

## 2025-03-25 RX ORDER — AMOXICILLIN 875 MG/1
875 TABLET, COATED ORAL 2 TIMES DAILY
Qty: 20 TABLET | Refills: 0 | Status: SHIPPED | OUTPATIENT
Start: 2025-03-25 | End: 2025-04-04

## 2025-03-25 SDOH — ECONOMIC STABILITY: FOOD INSECURITY: WITHIN THE PAST 12 MONTHS, THE FOOD YOU BOUGHT JUST DIDN'T LAST AND YOU DIDN'T HAVE MONEY TO GET MORE.: NEVER TRUE

## 2025-03-25 SDOH — ECONOMIC STABILITY: FOOD INSECURITY: WITHIN THE PAST 12 MONTHS, YOU WORRIED THAT YOUR FOOD WOULD RUN OUT BEFORE YOU GOT MONEY TO BUY MORE.: NEVER TRUE

## 2025-03-25 ASSESSMENT — ANXIETY QUESTIONNAIRES
3. WORRYING TOO MUCH ABOUT DIFFERENT THINGS: SEVERAL DAYS
6. BECOMING EASILY ANNOYED OR IRRITABLE: NOT AT ALL
GAD7 TOTAL SCORE: 6
1. FEELING NERVOUS, ANXIOUS, OR ON EDGE: SEVERAL DAYS
IF YOU CHECKED OFF ANY PROBLEMS ON THIS QUESTIONNAIRE, HOW DIFFICULT HAVE THESE PROBLEMS MADE IT FOR YOU TO DO YOUR WORK, TAKE CARE OF THINGS AT HOME, OR GET ALONG WITH OTHER PEOPLE: NOT DIFFICULT AT ALL
5. BEING SO RESTLESS THAT IT IS HARD TO SIT STILL: MORE THAN HALF THE DAYS
2. NOT BEING ABLE TO STOP OR CONTROL WORRYING: NOT AT ALL
7. FEELING AFRAID AS IF SOMETHING AWFUL MIGHT HAPPEN: NOT AT ALL
4. TROUBLE RELAXING: MORE THAN HALF THE DAYS

## 2025-03-25 ASSESSMENT — PATIENT HEALTH QUESTIONNAIRE - PHQ9
SUM OF ALL RESPONSES TO PHQ QUESTIONS 1-9: 6
SUM OF ALL RESPONSES TO PHQ QUESTIONS 1-9: 6
3. TROUBLE FALLING OR STAYING ASLEEP: NEARLY EVERY DAY
SUM OF ALL RESPONSES TO PHQ QUESTIONS 1-9: 6
9. THOUGHTS THAT YOU WOULD BE BETTER OFF DEAD, OR OF HURTING YOURSELF: NOT AT ALL
10. IF YOU CHECKED OFF ANY PROBLEMS, HOW DIFFICULT HAVE THESE PROBLEMS MADE IT FOR YOU TO DO YOUR WORK, TAKE CARE OF THINGS AT HOME, OR GET ALONG WITH OTHER PEOPLE: NOT DIFFICULT AT ALL
1. LITTLE INTEREST OR PLEASURE IN DOING THINGS: SEVERAL DAYS
6. FEELING BAD ABOUT YOURSELF - OR THAT YOU ARE A FAILURE OR HAVE LET YOURSELF OR YOUR FAMILY DOWN: NOT AT ALL
2. FEELING DOWN, DEPRESSED OR HOPELESS: NOT AT ALL
8. MOVING OR SPEAKING SO SLOWLY THAT OTHER PEOPLE COULD HAVE NOTICED. OR THE OPPOSITE, BEING SO FIGETY OR RESTLESS THAT YOU HAVE BEEN MOVING AROUND A LOT MORE THAN USUAL: NOT AT ALL
SUM OF ALL RESPONSES TO PHQ QUESTIONS 1-9: 6
7. TROUBLE CONCENTRATING ON THINGS, SUCH AS READING THE NEWSPAPER OR WATCHING TELEVISION: NOT AT ALL
4. FEELING TIRED OR HAVING LITTLE ENERGY: SEVERAL DAYS
5. POOR APPETITE OR OVEREATING: SEVERAL DAYS

## 2025-03-25 NOTE — PROGRESS NOTES
[facility]  [facility] MEDICINE  [address]  [address]  [facility] [address]  Dept: [phone]    3/25/2025    CHIEF COMPLAINT    Chief Complaint   Patient presents with    Medication Check     No concerns        HPI    Tre Ramírez is a 29 y.o. male who presents   Chief Complaint   Patient presents with    Medication Check     No concerns    .  Appointment to follow-up on anxiety and depression.     Specialists:     Gastroenterology- Dr. Benoit & MIN[facility] Do.   [facility]urology [facility]   Cardiology- [facility]    Anxiety/depression/insomnia- Doing much better than last appointment.   Continues taking Trazodone at night to help with sleep.    PHQ-9 Total Score: 6 (3/25/2025 11:23 AM)  Thoughts that you would be better off dead, or of hurting yourself in some way: 0 (3/25/2025 11:23 AM)    Acid reflux, nausea, abdominal pain and cramping- increased lately, but feels related to diet including more red sauce.  Did try to see [facility], but had connection issues. They did  charged him a no-show fee.  He does not want to return for this reason    ADHD- Adderall 30 mg in AM with 15 mg in PM .  This dosage is working well.      Vitals:    03/25/25 1114   BP: 124/81   BP Site: Left Upper Arm   Patient Position: Sitting   BP Cuff Size: Medium Adult   Pulse: 88   Temp: 98.2 °F (36.8 °C)   TempSrc: Oral   Weight: 104.2 kg (229 lb 12.8 oz)   Height: 1.905 m (6' 3\")       Wt Readings from Last 3 Encounters:   03/25/25 104.2 kg (229 lb 12.8 oz)   11/25/24 91.6 kg (202 lb)   10/17/24 88 kg (194 lb)     BP Readings from Last 3 Encounters:   03/25/25 124/81   11/25/24 134/84   10/17/24 132/88       REVIEW OF SYSTEMS    Review of Systems   Constitutional:  Negative for chills, fatigue and fever.   HENT:  Positive for dental problem.    Respiratory: Negative.     Cardiovascular: Negative.  Negative for chest pain and palpitations.   Gastrointestinal:  Positive for

## 2025-07-31 ENCOUNTER — HOSPITAL ENCOUNTER (OUTPATIENT)
Age: 30
Setting detail: SPECIMEN
Discharge: HOME | End: 2025-07-31

## 2025-07-31 ENCOUNTER — OFFICE VISIT (OUTPATIENT)
Dept: FAMILY MEDICINE CLINIC | Age: 30
End: 2025-07-31
Payer: COMMERCIAL

## 2025-07-31 VITALS
OXYGEN SATURATION: 99 % | HEART RATE: 82 BPM | RESPIRATION RATE: 15 BRPM | SYSTOLIC BLOOD PRESSURE: 120 MMHG | BODY MASS INDEX: 29.84 KG/M2 | WEIGHT: 240 LBS | HEIGHT: 75 IN | DIASTOLIC BLOOD PRESSURE: 82 MMHG | TEMPERATURE: 98.2 F

## 2025-07-31 DIAGNOSIS — R00.0 TACHYCARDIA: ICD-10-CM

## 2025-07-31 DIAGNOSIS — Z13.29 SCREENING FOR ENDOCRINE, NUTRITIONAL, METABOLIC AND IMMUNITY DISORDER: ICD-10-CM

## 2025-07-31 DIAGNOSIS — F41.9 ANXIETY: ICD-10-CM

## 2025-07-31 DIAGNOSIS — Z00.00 ROUTINE PHYSICAL EXAMINATION: Primary | ICD-10-CM

## 2025-07-31 DIAGNOSIS — Z13.0 SCREENING FOR ENDOCRINE, NUTRITIONAL, METABOLIC AND IMMUNITY DISORDER: ICD-10-CM

## 2025-07-31 DIAGNOSIS — Z79.899 MEDICATION MANAGEMENT: ICD-10-CM

## 2025-07-31 DIAGNOSIS — Z13.21 SCREENING FOR ENDOCRINE, NUTRITIONAL, METABOLIC AND IMMUNITY DISORDER: ICD-10-CM

## 2025-07-31 DIAGNOSIS — R73.03 PREDIABETES: ICD-10-CM

## 2025-07-31 DIAGNOSIS — F34.1 DYSTHYMIA: ICD-10-CM

## 2025-07-31 DIAGNOSIS — Z13.228 SCREENING FOR ENDOCRINE, NUTRITIONAL, METABOLIC AND IMMUNITY DISORDER: ICD-10-CM

## 2025-07-31 DIAGNOSIS — F90.2 ATTENTION DEFICIT HYPERACTIVITY DISORDER (ADHD), COMBINED TYPE: ICD-10-CM

## 2025-07-31 DIAGNOSIS — Z13.220 LIPID SCREENING: ICD-10-CM

## 2025-07-31 DIAGNOSIS — G47.09 OTHER INSOMNIA: ICD-10-CM

## 2025-07-31 LAB
25(OH)D3 SERPL-MCNC: 13.3 NG/ML (ref 30–100)
ALBUMIN SERPL-MCNC: 4.5 G/DL (ref 3.5–5.2)
ALBUMIN/GLOB SERPL: 1.6 {RATIO} (ref 1–2.5)
ALP SERPL-CCNC: 65 U/L (ref 40–129)
ALT SERPL-CCNC: 36 U/L (ref 10–50)
AMPHET UR QL SCN: NEGATIVE
ANION GAP SERPL CALCULATED.3IONS-SCNC: 10 MMOL/L (ref 9–16)
AST SERPL-CCNC: 24 U/L (ref 10–50)
BARBITURATES UR QL SCN: NEGATIVE
BASOPHILS # BLD: <0.03 K/UL (ref 0–0.2)
BASOPHILS NFR BLD: 0 % (ref 0–2)
BENZODIAZ UR QL: NEGATIVE
BILIRUB SERPL-MCNC: 0.3 MG/DL (ref 0–1.2)
BUN SERPL-MCNC: 11 MG/DL (ref 6–20)
CALCIUM SERPL-MCNC: 9.9 MG/DL (ref 8.6–10.4)
CANNABINOIDS UR QL SCN: POSITIVE
CHLORIDE SERPL-SCNC: 105 MMOL/L (ref 98–107)
CHOLEST SERPL-MCNC: 161 MG/DL (ref 0–199)
CHOLESTEROL/HDL RATIO: 3.9
CO2 SERPL-SCNC: 26 MMOL/L (ref 20–31)
COCAINE UR QL SCN: NEGATIVE
CREAT SERPL-MCNC: 0.8 MG/DL (ref 0.7–1.2)
EOSINOPHIL # BLD: 0.11 K/UL (ref 0–0.44)
EOSINOPHILS RELATIVE PERCENT: 2 % (ref 1–4)
ERYTHROCYTE [DISTWIDTH] IN BLOOD BY AUTOMATED COUNT: 13.2 % (ref 11.8–14.4)
EST. AVERAGE GLUCOSE BLD GHB EST-MCNC: 111 MG/DL
FENTANYL UR QL: NEGATIVE
FERRITIN SERPL-MCNC: 162 NG/ML (ref 30–400)
GFR, ESTIMATED: >90 ML/MIN/1.73M2
GLUCOSE SERPL-MCNC: 98 MG/DL (ref 74–99)
HBA1C MFR BLD: 5.5 % (ref 4–6)
HCT VFR BLD AUTO: 41.8 % (ref 40.7–50.3)
HDLC SERPL-MCNC: 41 MG/DL
HGB BLD-MCNC: 14 G/DL (ref 13–17)
IMM GRANULOCYTES # BLD AUTO: <0.03 K/UL (ref 0–0.3)
IMM GRANULOCYTES NFR BLD: 0 %
IRON SATN MFR SERPL: 19 % (ref 20–55)
IRON SERPL-MCNC: 55 UG/DL (ref 61–157)
LDLC SERPL CALC-MCNC: 106 MG/DL (ref 0–100)
LYMPHOCYTES NFR BLD: 2.73 K/UL (ref 1.1–3.7)
LYMPHOCYTES RELATIVE PERCENT: 37 % (ref 24–43)
MAGNESIUM SERPL-MCNC: 2.2 MG/DL (ref 1.6–2.6)
MCH RBC QN AUTO: 30.3 PG (ref 25.2–33.5)
MCHC RBC AUTO-ENTMCNC: 33.5 G/DL (ref 28.4–34.8)
MCV RBC AUTO: 90.5 FL (ref 82.6–102.9)
METHADONE UR QL: NEGATIVE
MONOCYTES NFR BLD: 0.72 K/UL (ref 0.1–1.2)
MONOCYTES NFR BLD: 10 % (ref 3–12)
NEUTROPHILS NFR BLD: 51 % (ref 36–65)
NEUTS SEG NFR BLD: 3.81 K/UL (ref 1.5–8.1)
NRBC BLD-RTO: 0 PER 100 WBC
OPIATES UR QL SCN: NEGATIVE
OXYCODONE UR QL SCN: NEGATIVE
PCP UR QL SCN: NEGATIVE
PLATELET # BLD AUTO: 348 K/UL (ref 138–453)
PMV BLD AUTO: 9.8 FL (ref 8.1–13.5)
POTASSIUM SERPL-SCNC: 4.8 MMOL/L (ref 3.7–5.3)
PROT SERPL-MCNC: 7.3 G/DL (ref 6.6–8.7)
RBC # BLD AUTO: 4.62 M/UL (ref 4.21–5.77)
SODIUM SERPL-SCNC: 141 MMOL/L (ref 136–145)
T4 FREE SERPL-MCNC: 1.3 NG/DL (ref 0.9–1.7)
TEST INFORMATION: ABNORMAL
TIBC SERPL-MCNC: 295 UG/DL (ref 250–450)
TRIGL SERPL-MCNC: 72 MG/DL
TSH SERPL DL<=0.05 MIU/L-ACNC: 0.66 UIU/ML (ref 0.27–4.2)
UNSATURATED IRON BINDING CAPACITY: 240 UG/DL (ref 112–347)
VIT B12 SERPL-MCNC: 576 PG/ML (ref 232–1245)
VLDLC SERPL CALC-MCNC: 14 MG/DL (ref 1–30)
WBC OTHER # BLD: 7.4 K/UL (ref 3.5–11.3)

## 2025-07-31 PROCEDURE — 99395 PREV VISIT EST AGE 18-39: CPT | Performed by: NURSE PRACTITIONER

## 2025-07-31 RX ORDER — DEXTROAMPHETAMINE SACCHARATE, AMPHETAMINE ASPARTATE, DEXTROAMPHETAMINE SULFATE AND AMPHETAMINE SULFATE 7.5; 7.5; 7.5; 7.5 MG/1; MG/1; MG/1; MG/1
TABLET ORAL
Qty: 45 TABLET | Refills: 0 | Status: CANCELLED | OUTPATIENT
Start: 2025-07-31 | End: 2025-08-30

## 2025-07-31 RX ORDER — DEXTROAMPHETAMINE SACCHARATE, AMPHETAMINE ASPARTATE, DEXTROAMPHETAMINE SULFATE AND AMPHETAMINE SULFATE 7.5; 7.5; 7.5; 7.5 MG/1; MG/1; MG/1; MG/1
TABLET ORAL
Qty: 45 TABLET | Refills: 0 | Status: SHIPPED | OUTPATIENT
Start: 2025-07-31 | End: 2025-09-29

## 2025-07-31 RX ORDER — DEXTROAMPHETAMINE SACCHARATE, AMPHETAMINE ASPARTATE, DEXTROAMPHETAMINE SULFATE AND AMPHETAMINE SULFATE 7.5; 7.5; 7.5; 7.5 MG/1; MG/1; MG/1; MG/1
TABLET ORAL
Qty: 45 TABLET | Refills: 0 | Status: SHIPPED | OUTPATIENT
Start: 2025-08-30 | End: 2025-10-29

## 2025-07-31 RX ORDER — CHLORHEXIDINE GLUCONATE ORAL RINSE 1.2 MG/ML
15 SOLUTION DENTAL 2 TIMES DAILY
Qty: 473 ML | Refills: 3 | Status: SHIPPED | OUTPATIENT
Start: 2025-07-31 | End: 2025-08-14

## 2025-07-31 RX ORDER — DEXTROAMPHETAMINE SACCHARATE, AMPHETAMINE ASPARTATE, DEXTROAMPHETAMINE SULFATE AND AMPHETAMINE SULFATE 7.5; 7.5; 7.5; 7.5 MG/1; MG/1; MG/1; MG/1
TABLET ORAL
Qty: 45 TABLET | Refills: 0 | Status: SHIPPED | OUTPATIENT
Start: 2025-09-29 | End: 2025-11-28

## 2025-07-31 ASSESSMENT — PATIENT HEALTH QUESTIONNAIRE - PHQ9
SUM OF ALL RESPONSES TO PHQ QUESTIONS 1-9: 10
8. MOVING OR SPEAKING SO SLOWLY THAT OTHER PEOPLE COULD HAVE NOTICED. OR THE OPPOSITE, BEING SO FIGETY OR RESTLESS THAT YOU HAVE BEEN MOVING AROUND A LOT MORE THAN USUAL: NOT AT ALL
SUM OF ALL RESPONSES TO PHQ QUESTIONS 1-9: 10
3. TROUBLE FALLING OR STAYING ASLEEP: MORE THAN HALF THE DAYS
2. FEELING DOWN, DEPRESSED OR HOPELESS: SEVERAL DAYS
5. POOR APPETITE OR OVEREATING: NEARLY EVERY DAY
9. THOUGHTS THAT YOU WOULD BE BETTER OFF DEAD, OR OF HURTING YOURSELF: NOT AT ALL
4. FEELING TIRED OR HAVING LITTLE ENERGY: NOT AT ALL
7. TROUBLE CONCENTRATING ON THINGS, SUCH AS READING THE NEWSPAPER OR WATCHING TELEVISION: MORE THAN HALF THE DAYS
SUM OF ALL RESPONSES TO PHQ QUESTIONS 1-9: 10
10. IF YOU CHECKED OFF ANY PROBLEMS, HOW DIFFICULT HAVE THESE PROBLEMS MADE IT FOR YOU TO DO YOUR WORK, TAKE CARE OF THINGS AT HOME, OR GET ALONG WITH OTHER PEOPLE: SOMEWHAT DIFFICULT
1. LITTLE INTEREST OR PLEASURE IN DOING THINGS: SEVERAL DAYS
6. FEELING BAD ABOUT YOURSELF - OR THAT YOU ARE A FAILURE OR HAVE LET YOURSELF OR YOUR FAMILY DOWN: SEVERAL DAYS
SUM OF ALL RESPONSES TO PHQ QUESTIONS 1-9: 10

## 2025-08-05 ENCOUNTER — PATIENT MESSAGE (OUTPATIENT)
Dept: FAMILY MEDICINE CLINIC | Age: 30
End: 2025-08-05

## 2025-08-07 ENCOUNTER — PATIENT MESSAGE (OUTPATIENT)
Dept: FAMILY MEDICINE CLINIC | Age: 30
End: 2025-08-07

## 2025-08-11 ENCOUNTER — HOSPITAL ENCOUNTER (OUTPATIENT)
Dept: GENERAL RADIOLOGY | Age: 30
Discharge: HOME | End: 2025-08-13
Payer: COMMERCIAL

## 2025-08-11 ENCOUNTER — OFFICE VISIT (OUTPATIENT)
Age: 30
End: 2025-08-11

## 2025-08-11 VITALS
RESPIRATION RATE: 18 BRPM | DIASTOLIC BLOOD PRESSURE: 79 MMHG | TEMPERATURE: 98.3 F | HEART RATE: 79 BPM | SYSTOLIC BLOOD PRESSURE: 116 MMHG | HEIGHT: 75 IN | BODY MASS INDEX: 27.98 KG/M2 | WEIGHT: 225 LBS | OXYGEN SATURATION: 95 %

## 2025-08-11 DIAGNOSIS — M79.672 LEFT FOOT PAIN: ICD-10-CM

## 2025-08-11 DIAGNOSIS — M79.672 LEFT FOOT PAIN: Primary | ICD-10-CM

## 2025-08-11 PROCEDURE — 73630 X-RAY EXAM OF FOOT: CPT

## 2025-08-11 RX ORDER — IBUPROFEN 800 MG/1
800 TABLET, FILM COATED ORAL 2 TIMES DAILY PRN
Qty: 60 TABLET | Refills: 5 | Status: SHIPPED | OUTPATIENT
Start: 2025-08-11

## 2025-08-11 ASSESSMENT — ENCOUNTER SYMPTOMS
GASTROINTESTINAL NEGATIVE: 1
EYES NEGATIVE: 1
RESPIRATORY NEGATIVE: 1

## 2025-08-16 DIAGNOSIS — K21.9 GASTROESOPHAGEAL REFLUX DISEASE WITHOUT ESOPHAGITIS: ICD-10-CM

## 2025-08-19 RX ORDER — OMEPRAZOLE 40 MG/1
40 CAPSULE, DELAYED RELEASE ORAL
Qty: 30 CAPSULE | Refills: 5 | Status: SHIPPED | OUTPATIENT
Start: 2025-08-19

## 2025-08-22 ENCOUNTER — HOSPITAL ENCOUNTER (OUTPATIENT)
Dept: GENERAL RADIOLOGY | Age: 30
Discharge: HOME | End: 2025-08-24
Payer: COMMERCIAL

## 2025-08-22 ENCOUNTER — OFFICE VISIT (OUTPATIENT)
Dept: FAMILY MEDICINE CLINIC | Age: 30
End: 2025-08-22
Payer: COMMERCIAL

## 2025-08-22 ENCOUNTER — PATIENT MESSAGE (OUTPATIENT)
Dept: FAMILY MEDICINE CLINIC | Age: 30
End: 2025-08-22

## 2025-08-22 VITALS
BODY MASS INDEX: 29.75 KG/M2 | DIASTOLIC BLOOD PRESSURE: 87 MMHG | SYSTOLIC BLOOD PRESSURE: 127 MMHG | WEIGHT: 238 LBS | OXYGEN SATURATION: 96 % | HEART RATE: 104 BPM

## 2025-08-22 DIAGNOSIS — M54.6 THORACIC SPINE PAIN: ICD-10-CM

## 2025-08-22 DIAGNOSIS — M54.41 ACUTE BILATERAL LOW BACK PAIN WITH BILATERAL SCIATICA: Primary | ICD-10-CM

## 2025-08-22 DIAGNOSIS — M54.42 ACUTE BILATERAL LOW BACK PAIN WITH BILATERAL SCIATICA: Primary | ICD-10-CM

## 2025-08-22 DIAGNOSIS — M54.42 ACUTE BILATERAL LOW BACK PAIN WITH BILATERAL SCIATICA: ICD-10-CM

## 2025-08-22 DIAGNOSIS — M54.41 ACUTE BILATERAL LOW BACK PAIN WITH BILATERAL SCIATICA: ICD-10-CM

## 2025-08-22 PROCEDURE — 72072 X-RAY EXAM THORAC SPINE 3VWS: CPT

## 2025-08-22 PROCEDURE — 72100 X-RAY EXAM L-S SPINE 2/3 VWS: CPT

## 2025-08-22 PROCEDURE — 99213 OFFICE O/P EST LOW 20 MIN: CPT | Performed by: NURSE PRACTITIONER

## 2025-08-22 PROCEDURE — 96372 THER/PROPH/DIAG INJ SC/IM: CPT | Performed by: NURSE PRACTITIONER

## 2025-08-22 RX ORDER — KETOROLAC TROMETHAMINE 30 MG/ML
60 INJECTION, SOLUTION INTRAMUSCULAR; INTRAVENOUS ONCE
Status: COMPLETED | OUTPATIENT
Start: 2025-08-22 | End: 2025-08-22

## 2025-08-22 RX ORDER — TRIAMCINOLONE ACETONIDE 40 MG/ML
40 INJECTION, SUSPENSION INTRA-ARTICULAR; INTRAMUSCULAR ONCE
Status: COMPLETED | OUTPATIENT
Start: 2025-08-22 | End: 2025-08-22

## 2025-08-22 RX ORDER — PREDNISONE 20 MG/1
20 TABLET ORAL 2 TIMES DAILY
Qty: 10 TABLET | Refills: 0 | Status: SHIPPED | OUTPATIENT
Start: 2025-08-22 | End: 2025-08-27

## 2025-08-22 RX ORDER — LIDOCAINE 50 MG/G
2 PATCH TOPICAL EVERY 24 HOURS
Qty: 60 PATCH | Refills: 5 | Status: SHIPPED | OUTPATIENT
Start: 2025-08-22

## 2025-08-22 RX ADMIN — TRIAMCINOLONE ACETONIDE 40 MG: 40 INJECTION, SUSPENSION INTRA-ARTICULAR; INTRAMUSCULAR at 11:43

## 2025-08-22 RX ADMIN — KETOROLAC TROMETHAMINE 60 MG: 30 INJECTION, SOLUTION INTRAMUSCULAR; INTRAVENOUS at 11:42

## 2025-08-22 SDOH — ECONOMIC STABILITY: FOOD INSECURITY: WITHIN THE PAST 12 MONTHS, YOU WORRIED THAT YOUR FOOD WOULD RUN OUT BEFORE YOU GOT MONEY TO BUY MORE.: NEVER TRUE

## 2025-08-22 SDOH — ECONOMIC STABILITY: FOOD INSECURITY: WITHIN THE PAST 12 MONTHS, THE FOOD YOU BOUGHT JUST DIDN'T LAST AND YOU DIDN'T HAVE MONEY TO GET MORE.: NEVER TRUE

## 2025-08-22 ASSESSMENT — PATIENT HEALTH QUESTIONNAIRE - PHQ9
SUM OF ALL RESPONSES TO PHQ QUESTIONS 1-9: 0
SUM OF ALL RESPONSES TO PHQ QUESTIONS 1-9: 0
2. FEELING DOWN, DEPRESSED OR HOPELESS: NOT AT ALL
SUM OF ALL RESPONSES TO PHQ QUESTIONS 1-9: 0
1. LITTLE INTEREST OR PLEASURE IN DOING THINGS: NOT AT ALL
SUM OF ALL RESPONSES TO PHQ QUESTIONS 1-9: 0

## 2025-08-30 ENCOUNTER — HOSPITAL ENCOUNTER (OUTPATIENT)
Dept: MRI IMAGING | Age: 30
Discharge: HOME | End: 2025-09-01
Payer: COMMERCIAL

## 2025-08-30 DIAGNOSIS — M54.42 ACUTE BILATERAL LOW BACK PAIN WITH BILATERAL SCIATICA: ICD-10-CM

## 2025-08-30 DIAGNOSIS — M54.41 ACUTE BILATERAL LOW BACK PAIN WITH BILATERAL SCIATICA: ICD-10-CM

## 2025-08-30 PROCEDURE — 72148 MRI LUMBAR SPINE W/O DYE: CPT

## 2025-09-04 ENCOUNTER — TELEPHONE (OUTPATIENT)
Dept: FAMILY MEDICINE CLINIC | Age: 30
End: 2025-09-04

## (undated) DEVICE — YANKAUER,FLEXIBLE HANDLE,REGLR CAPACITY: Brand: MEDLINE INDUSTRIES, INC.

## (undated) DEVICE — AIRLIFE™ MISTY MAX 10™ NEBULIZER WITH 7 FOOT (2.1 M) CRUSH RESISTANT OXYGEN TUBING, BAFFLED TEE ADAPTER (22 MM I.D./22 MM O.D.), MOUTHPIECE AND 6 INCH (15 CM) FLEXTUBE: Brand: AIRLIFE™